# Patient Record
Sex: FEMALE | Race: WHITE | NOT HISPANIC OR LATINO | URBAN - METROPOLITAN AREA
[De-identification: names, ages, dates, MRNs, and addresses within clinical notes are randomized per-mention and may not be internally consistent; named-entity substitution may affect disease eponyms.]

---

## 2016-07-27 RX ORDER — PANTOPRAZOLE SODIUM 20 MG/1
1 TABLET, DELAYED RELEASE ORAL
Qty: 0 | Refills: 0 | DISCHARGE
Start: 2016-07-27

## 2019-01-23 ENCOUNTER — EMERGENCY (EMERGENCY)
Facility: HOSPITAL | Age: 79
LOS: 1 days | Discharge: HOME | End: 2019-01-23
Attending: EMERGENCY MEDICINE | Admitting: EMERGENCY MEDICINE
Payer: MEDICARE

## 2019-01-23 VITALS
HEART RATE: 108 BPM | RESPIRATION RATE: 20 BRPM | TEMPERATURE: 98 F | OXYGEN SATURATION: 92 % | SYSTOLIC BLOOD PRESSURE: 123 MMHG | DIASTOLIC BLOOD PRESSURE: 83 MMHG

## 2019-01-23 VITALS
TEMPERATURE: 98 F | SYSTOLIC BLOOD PRESSURE: 138 MMHG | OXYGEN SATURATION: 95 % | HEART RATE: 68 BPM | RESPIRATION RATE: 18 BRPM | DIASTOLIC BLOOD PRESSURE: 76 MMHG

## 2019-01-23 DIAGNOSIS — Z90.49 ACQUIRED ABSENCE OF OTHER SPECIFIED PARTS OF DIGESTIVE TRACT: Chronic | ICD-10-CM

## 2019-01-23 DIAGNOSIS — Z98.89 OTHER SPECIFIED POSTPROCEDURAL STATES: Chronic | ICD-10-CM

## 2019-01-23 DIAGNOSIS — E03.9 HYPOTHYROIDISM, UNSPECIFIED: ICD-10-CM

## 2019-01-23 DIAGNOSIS — J45.909 UNSPECIFIED ASTHMA, UNCOMPLICATED: ICD-10-CM

## 2019-01-23 DIAGNOSIS — Z79.899 OTHER LONG TERM (CURRENT) DRUG THERAPY: ICD-10-CM

## 2019-01-23 DIAGNOSIS — M54.9 DORSALGIA, UNSPECIFIED: ICD-10-CM

## 2019-01-23 DIAGNOSIS — E78.00 PURE HYPERCHOLESTEROLEMIA, UNSPECIFIED: ICD-10-CM

## 2019-01-23 DIAGNOSIS — R53.1 WEAKNESS: ICD-10-CM

## 2019-01-23 DIAGNOSIS — J18.9 PNEUMONIA, UNSPECIFIED ORGANISM: ICD-10-CM

## 2019-01-23 DIAGNOSIS — I10 ESSENTIAL (PRIMARY) HYPERTENSION: ICD-10-CM

## 2019-01-23 LAB
ALBUMIN SERPL ELPH-MCNC: 3.9 G/DL — SIGNIFICANT CHANGE UP (ref 3.3–5)
ALP SERPL-CCNC: 69 U/L — SIGNIFICANT CHANGE UP (ref 40–120)
ALT FLD-CCNC: 13 U/L — SIGNIFICANT CHANGE UP (ref 10–45)
ANION GAP SERPL CALC-SCNC: 10 MMOL/L — SIGNIFICANT CHANGE UP (ref 5–17)
APPEARANCE UR: CLEAR — SIGNIFICANT CHANGE UP
AST SERPL-CCNC: 24 U/L — SIGNIFICANT CHANGE UP (ref 10–40)
BASOPHILS NFR BLD AUTO: 0.1 % — SIGNIFICANT CHANGE UP (ref 0–2)
BILIRUB SERPL-MCNC: 0.9 MG/DL — SIGNIFICANT CHANGE UP (ref 0.2–1.2)
BILIRUB UR-MCNC: NEGATIVE — SIGNIFICANT CHANGE UP
BUN SERPL-MCNC: 22 MG/DL — SIGNIFICANT CHANGE UP (ref 7–23)
CALCIUM SERPL-MCNC: 9.4 MG/DL — SIGNIFICANT CHANGE UP (ref 8.4–10.5)
CHLORIDE SERPL-SCNC: 105 MMOL/L — SIGNIFICANT CHANGE UP (ref 96–108)
CO2 SERPL-SCNC: 26 MMOL/L — SIGNIFICANT CHANGE UP (ref 22–31)
COLOR SPEC: YELLOW — SIGNIFICANT CHANGE UP
CREAT SERPL-MCNC: 0.84 MG/DL — SIGNIFICANT CHANGE UP (ref 0.5–1.3)
DIFF PNL FLD: NEGATIVE — SIGNIFICANT CHANGE UP
EOSINOPHIL NFR BLD AUTO: 0.1 % — SIGNIFICANT CHANGE UP (ref 0–6)
GLUCOSE SERPL-MCNC: 110 MG/DL — HIGH (ref 70–99)
GLUCOSE UR QL: NEGATIVE — SIGNIFICANT CHANGE UP
HCT VFR BLD CALC: 35.3 % — SIGNIFICANT CHANGE UP (ref 34.5–45)
HGB BLD-MCNC: 11.4 G/DL — LOW (ref 11.5–15.5)
KETONES UR-MCNC: 15 MG/DL
LACTATE SERPL-SCNC: 1.3 MMOL/L — SIGNIFICANT CHANGE UP (ref 0.5–2)
LEUKOCYTE ESTERASE UR-ACNC: ABNORMAL
LIDOCAIN IGE QN: 69 U/L — HIGH (ref 7–60)
LYMPHOCYTES # BLD AUTO: 2.9 % — LOW (ref 13–44)
MCHC RBC-ENTMCNC: 26.9 PG — LOW (ref 27–34)
MCHC RBC-ENTMCNC: 32.3 G/DL — SIGNIFICANT CHANGE UP (ref 32–36)
MCV RBC AUTO: 83.3 FL — SIGNIFICANT CHANGE UP (ref 80–100)
MONOCYTES NFR BLD AUTO: 6 % — SIGNIFICANT CHANGE UP (ref 2–14)
NEUTROPHILS NFR BLD AUTO: 90.9 % — HIGH (ref 43–77)
NITRITE UR-MCNC: NEGATIVE — SIGNIFICANT CHANGE UP
PH UR: 6 — SIGNIFICANT CHANGE UP (ref 5–8)
PLATELET # BLD AUTO: 261 K/UL — SIGNIFICANT CHANGE UP (ref 150–400)
POTASSIUM SERPL-MCNC: 4 MMOL/L — SIGNIFICANT CHANGE UP (ref 3.5–5.3)
POTASSIUM SERPL-SCNC: 4 MMOL/L — SIGNIFICANT CHANGE UP (ref 3.5–5.3)
PROT SERPL-MCNC: 6.8 G/DL — SIGNIFICANT CHANGE UP (ref 6–8.3)
PROT UR-MCNC: ABNORMAL MG/DL
RAPID RVP RESULT: SIGNIFICANT CHANGE UP
RBC # BLD: 4.24 M/UL — SIGNIFICANT CHANGE UP (ref 3.8–5.2)
RBC # FLD: 15.2 % — SIGNIFICANT CHANGE UP (ref 10.3–16.9)
SODIUM SERPL-SCNC: 141 MMOL/L — SIGNIFICANT CHANGE UP (ref 135–145)
SP GR SPEC: 1.02 — SIGNIFICANT CHANGE UP (ref 1–1.03)
TROPONIN T SERPL-MCNC: <0.01 NG/ML — SIGNIFICANT CHANGE UP (ref 0–0.01)
UROBILINOGEN FLD QL: 0.2 E.U./DL — SIGNIFICANT CHANGE UP
WBC # BLD: 16.6 K/UL — HIGH (ref 3.8–10.5)
WBC # FLD AUTO: 16.6 K/UL — HIGH (ref 3.8–10.5)

## 2019-01-23 PROCEDURE — 83605 ASSAY OF LACTIC ACID: CPT

## 2019-01-23 PROCEDURE — 80053 COMPREHEN METABOLIC PANEL: CPT

## 2019-01-23 PROCEDURE — 96361 HYDRATE IV INFUSION ADD-ON: CPT

## 2019-01-23 PROCEDURE — 71250 CT THORAX DX C-: CPT

## 2019-01-23 PROCEDURE — 87798 DETECT AGENT NOS DNA AMP: CPT

## 2019-01-23 PROCEDURE — 87486 CHLMYD PNEUM DNA AMP PROBE: CPT

## 2019-01-23 PROCEDURE — 81001 URINALYSIS AUTO W/SCOPE: CPT

## 2019-01-23 PROCEDURE — 71045 X-RAY EXAM CHEST 1 VIEW: CPT | Mod: 26

## 2019-01-23 PROCEDURE — 93005 ELECTROCARDIOGRAM TRACING: CPT

## 2019-01-23 PROCEDURE — 96360 HYDRATION IV INFUSION INIT: CPT

## 2019-01-23 PROCEDURE — 83690 ASSAY OF LIPASE: CPT

## 2019-01-23 PROCEDURE — 99284 EMERGENCY DEPT VISIT MOD MDM: CPT | Mod: 25

## 2019-01-23 PROCEDURE — 99285 EMERGENCY DEPT VISIT HI MDM: CPT | Mod: 25

## 2019-01-23 PROCEDURE — 71250 CT THORAX DX C-: CPT | Mod: 26

## 2019-01-23 PROCEDURE — 87633 RESP VIRUS 12-25 TARGETS: CPT

## 2019-01-23 PROCEDURE — 93010 ELECTROCARDIOGRAM REPORT: CPT

## 2019-01-23 PROCEDURE — 84484 ASSAY OF TROPONIN QUANT: CPT

## 2019-01-23 PROCEDURE — 87581 M.PNEUMON DNA AMP PROBE: CPT

## 2019-01-23 PROCEDURE — 85025 COMPLETE CBC W/AUTO DIFF WBC: CPT

## 2019-01-23 PROCEDURE — 71045 X-RAY EXAM CHEST 1 VIEW: CPT

## 2019-01-23 PROCEDURE — 36415 COLL VENOUS BLD VENIPUNCTURE: CPT

## 2019-01-23 RX ORDER — CIPROFLOXACIN LACTATE 400MG/40ML
1 VIAL (ML) INTRAVENOUS
Qty: 5 | Refills: 0
Start: 2019-01-23 | End: 2019-01-27

## 2019-01-23 RX ORDER — SODIUM CHLORIDE 9 MG/ML
500 INJECTION INTRAMUSCULAR; INTRAVENOUS; SUBCUTANEOUS ONCE
Qty: 0 | Refills: 0 | Status: COMPLETED | OUTPATIENT
Start: 2019-01-23 | End: 2019-01-23

## 2019-01-23 RX ADMIN — SODIUM CHLORIDE 500 MILLILITER(S): 9 INJECTION INTRAMUSCULAR; INTRAVENOUS; SUBCUTANEOUS at 21:00

## 2019-01-23 RX ADMIN — SODIUM CHLORIDE 1000 MILLILITER(S): 9 INJECTION INTRAMUSCULAR; INTRAVENOUS; SUBCUTANEOUS at 21:01

## 2019-01-23 RX ADMIN — SODIUM CHLORIDE 500 MILLILITER(S): 9 INJECTION INTRAMUSCULAR; INTRAVENOUS; SUBCUTANEOUS at 22:51

## 2019-01-23 RX ADMIN — SODIUM CHLORIDE 1000 MILLILITER(S): 9 INJECTION INTRAMUSCULAR; INTRAVENOUS; SUBCUTANEOUS at 19:10

## 2019-01-23 NOTE — ED PROVIDER NOTE - NSFOLLOWUPINSTRUCTIONS_ED_ALL_ED_FT
Immediately return to the Emergency Department or call 911 for any high fever, trouble breathing, severe vomiting, worsening pain, or any other concerns.    Pneumonia    Pneumonia is an infection of the lungs. Pneumonia may be caused by bacteria, viruses, or funguses. Symptoms include coughing, fever, chest pain when breathing deeply or coughing, shortness of breath, fatigue, or muscle aches. Pneumonia can be diagnosed with a medical history and physical exam, as well as other tests which may include a chest X-ray. If you were prescribed an antibiotic medicine, take it as told by your health care provider and do not stop taking the antibiotic even if you start to feel better. Do not use tobacco products, including cigarettes, chewing tobacco, and e-cigarettes.    SEEK IMMEDIATE MEDICAL CARE IF YOU HAVE ANY OF THE FOLLOWING SYMPTOMS: worsening shortness of breath, worsening chest pain, coughing up blood, change in mental status, lightheadedness/dizziness.

## 2019-01-23 NOTE — ED ADULT NURSE REASSESSMENT NOTE - NS ED NURSE REASSESS COMMENT FT1
patient at rest in stretcher, awaiting CT chest results. Denies acute complaints at this time. NAD Noted Will continue to monitor.

## 2019-01-23 NOTE — ED ADULT NURSE NOTE - OBJECTIVE STATEMENT
79 y/o female c/o generalized weakness x 1 week associated w/ minimally productive cough. Denies fever, chills, chest pain, SOB, dysuria, abdominal pain, nausea, vomiting, and any other associated sx

## 2019-01-23 NOTE — ED PROVIDER NOTE - PHYSICAL EXAMINATION
VITAL SIGNS: I have reviewed nursing notes and confirm.  CONSTITUTIONAL: Well-developed; well-nourished; in no acute distress.  SKIN: Skin is warm and dry, no acute rash.  HEAD: Normocephalic; atraumatic.  EYES:  EOM intact; conjunctiva and sclera clear.  ENT: No nasal discharge; airway clear.  NECK: Supple; Voluntary FROM  CARD: No rubs appreciated, tachycardic rate and rhythm.  RESP: No wheezes, no rales. No respiratory distress  ABD: Soft; non-distended; non-tender; no rebound or guarding  EXT: Normal ROM. No cyanosis or edema.  NEURO: Alert, oriented. Grossly unremarkable. Ambulatory at baseline w/ cane.   Alert, oriented. Grossly unremarkable. Patient is alert, oriented x person, place and time.  Cranial nerves 2-12 are intact.  Normal speech.  Cerebellar testing normal:  normal coordination and normal finger to nose.  Normal sensory exam.  No pronator drift.  5/5 bl upper extremity and lower extremity strength. Normal gait for pt.   PSYCH: Cooperative, appropriate.

## 2019-01-23 NOTE — ED PROVIDER NOTE - MEDICAL DECISION MAKING DETAILS
78F pmh asthma never intubated, HTN, HPL, GERD, hiatal hernia s/p fundoplication 2009, hypothyroid, p/w weakness for 1 week, progressive, generalized, fatigue-type. +cough, minimally productive for past few days, no fevers, no chills. CT noted for LLQ and LUQ pna. pt well appearing overall and feels well. Plan for po abx x1 here and home levofloxacin. Pt amenable to this plan and f/u with PMD on Friday, who she sees often, seeking discharge.

## 2019-01-23 NOTE — ED PROVIDER NOTE - OBJECTIVE STATEMENT
78F pmh   p/w weakness for 1 week, progressive, generalized, fatigue-type. +cough, minimally productive for past few days, no fevers, no chills.   No sob, no cp, no dysuria, no abd pain, no focal weakness, no headache.    Endorses chronic back pain 2/2 severe scoliosis 78F pmh asthma never intubated, HTN, HPL, GERD, hiatal hernia s/p fundoplication 2009, hypothyroid, p/w weakness for 1 week, progressive, generalized, fatigue-type. +cough, minimally productive for past few days, no fevers, no chills.   No sob, no cp, no dysuria, no abd pain, no focal weakness, no headache.  Endorses chronic back pain 2/2 severe scoliosis, unchanged since baseline.

## 2019-01-23 NOTE — ED ADULT NURSE NOTE - PMH
Asthma    Depression    GERD (gastroesophageal reflux disease)    High cholesterol    HTN (hypertension)    Hypothyroid    Primary biliary cirrhosis

## 2019-07-23 ENCOUNTER — EMERGENCY (EMERGENCY)
Facility: HOSPITAL | Age: 79
LOS: 1 days | Discharge: HOME | End: 2019-07-23
Attending: EMERGENCY MEDICINE | Admitting: EMERGENCY MEDICINE
Payer: MEDICARE

## 2019-07-23 VITALS
DIASTOLIC BLOOD PRESSURE: 114 MMHG | OXYGEN SATURATION: 97 % | HEART RATE: 82 BPM | RESPIRATION RATE: 22 BRPM | WEIGHT: 72.97 LBS | TEMPERATURE: 98 F | HEIGHT: 56 IN | SYSTOLIC BLOOD PRESSURE: 173 MMHG

## 2019-07-23 DIAGNOSIS — R06.02 SHORTNESS OF BREATH: ICD-10-CM

## 2019-07-23 DIAGNOSIS — Z90.49 ACQUIRED ABSENCE OF OTHER SPECIFIED PARTS OF DIGESTIVE TRACT: Chronic | ICD-10-CM

## 2019-07-23 DIAGNOSIS — Z98.89 OTHER SPECIFIED POSTPROCEDURAL STATES: Chronic | ICD-10-CM

## 2019-07-23 PROCEDURE — 99285 EMERGENCY DEPT VISIT HI MDM: CPT

## 2019-07-23 PROCEDURE — 71045 X-RAY EXAM CHEST 1 VIEW: CPT | Mod: 26

## 2019-07-23 PROCEDURE — 93010 ELECTROCARDIOGRAM REPORT: CPT

## 2019-07-23 RX ORDER — IPRATROPIUM/ALBUTEROL SULFATE 18-103MCG
3 AEROSOL WITH ADAPTER (GRAM) INHALATION ONCE
Refills: 0 | Status: COMPLETED | OUTPATIENT
Start: 2019-07-23 | End: 2019-07-23

## 2019-07-23 NOTE — ED PROVIDER NOTE - PHYSICAL EXAMINATION
GEN: Elderly, frail, NTAF, awake, alert, oriented to person, place, time/situation and in no apparent distress.  ENT: Airway patent, Nasal mucosa clear. Mouth with normal mucosa.  EYES: Clear bilaterally.  RESPIRATORY: Diminished BS bilaterally with end exp wheezes, no hypoxia, no retractions, mild tachypnea.   CARDIAC: Regular rate and rhythm  ABDOMEN: Soft, nontender, +bowel sounds, no rebound, rigidity, or guarding.  MSK: Range of motion is not limited, no deformities noted. No LE edema. +Scoliosis.  NEURO: Alert and oriented, no focal deficits.  SKIN: Skin normal color for race, warm, dry and intact. No evidence of rash.  PSYCH: Alert and oriented to person, place, time/situation. normal mood and affect. no apparent risk to self or others.

## 2019-07-23 NOTE — ED ADULT NURSE NOTE - CHPI ED NUR SYMPTOMS NEG
no cough/no edema/no fever/no chest pain/no chills/no body aches/no diaphoresis/no wheezing/no headache/no hemoptysis

## 2019-07-23 NOTE — ED PROVIDER NOTE - PROGRESS NOTE DETAILS
On reassessment pt improved. She is eager to go home. She is ambulatory w/o assistance. She was given Rx for inhaler and short course of steroids. Pt is stable for DC. After discussion of the results, pt agreeable to the discharge plan.  At this time, the evidence for any other entities in the differential is insufficient to justify any further testing. This was discussed and explained to the patient. It was advised that persistent or worsening symptoms would require further evaluation. This was discussed with the patient and family using shared decision making. ED evaluation and management discussed with the patient and family (if available) in detail.  Close PMD follow up encouraged.  Strict ED return instructions discussed in detail and patient given the opportunity to ask any questions about their discharge diagnosis and instructions. Patient verbalized understanding.

## 2019-07-23 NOTE — ED ADULT NURSE NOTE - OBJECTIVE STATEMENT
Pt presents complaining of shortness of breath for the past week worsening in the last two hours. Pt reports hx of asthma, states she has been using nebulizers at home to some relief, states no nebulizers used today. Pt reports no chest pain. Positive crackles auscultated in the left lower lobe on presentation. Pt reports no hx of fevers/chills or cough.

## 2019-07-23 NOTE — ED PROVIDER NOTE - CLINICAL SUMMARY MEDICAL DECISION MAKING FREE TEXT BOX
Pt with asthma exacerbation. Will treat with nebulized treatments and steroids. Pt does not require bipap or intubation at this time. No clinical evidence of ACS or PE.  Will observe and monitor for improvement.

## 2019-07-23 NOTE — ED ADULT TRIAGE NOTE - OTHER COMPLAINTS
biba from home for shortness of breath x two hours, denies pain, feels better with oxygen and sitting up

## 2019-07-23 NOTE — ED PROVIDER NOTE - NSFOLLOWUPINSTRUCTIONS_ED_ALL_ED_FT
Please follow up with your primary care physician or pulmonologist. If you have any problem getting an appointment this week, please call the ED Referral Coordinator at [phone].  Return to the Emergency Department if you have any new or worsening symptoms, or for any other concerns. Please read below for further information.    Asthma    Asthma is a condition in which the airways tighten and narrow, making it difficult to breath. Asthma episodes, also called asthma attacks, range from minor to life-threatening. Symptoms include wheezing, coughing, chest tightness, or shortness of breath. The diagnosis of asthma is made by a review of your medical history and a physical exam, but may involve additional testing. Asthma cannot be cured, but medicines and lifestyle changes can help control it. Avoid triggers of asthma which may include animal dander, pollen, mold, smoke, air pollutants, etc.     SEEK IMMEDIATE MEDICAL CARE IF YOU HAVE ANY OF THE FOLLOWING SYMPTOMS: worsening of symptoms, shortness of breath at rest, chest pain, bluish discoloration to lips or fingertips, lightheadedness/dizziness, or fever.

## 2019-07-23 NOTE — ED PROVIDER NOTE - OBJECTIVE STATEMENT
78F w/ PMHx of Asthma (never intubated), HTN, HPL, GERD, hiatal hernia (s/p fundoplication 2009), hypothyroidism, PBC, Depression, internal hemorrhoids, history of near syncope (2/2 to orthostatic and dehydration) who p/w SOB 78F w/ PMHx of Asthma (never intubated), HTN, HPL, GERD, hiatal hernia (s/p fundoplication 2009), hypothyroidism, PBC, Depression, internal hemorrhoids, history of near syncope (2/2 to orthostatic and dehydration) who p/w SOB. She has not used her pump at home. She was seen at urgent care and told to come to the ER bc she BP was high. No Headache, no cp, no dizziness or syncope, no f/c.

## 2019-07-24 VITALS
SYSTOLIC BLOOD PRESSURE: 161 MMHG | HEART RATE: 74 BPM | OXYGEN SATURATION: 98 % | TEMPERATURE: 97 F | DIASTOLIC BLOOD PRESSURE: 96 MMHG | RESPIRATION RATE: 16 BRPM

## 2019-07-24 LAB
ALBUMIN SERPL ELPH-MCNC: 4 G/DL — SIGNIFICANT CHANGE UP (ref 3.3–5)
ALP SERPL-CCNC: 103 U/L — SIGNIFICANT CHANGE UP (ref 40–120)
ALT FLD-CCNC: 19 U/L — SIGNIFICANT CHANGE UP (ref 10–45)
ANION GAP SERPL CALC-SCNC: 10 MMOL/L — SIGNIFICANT CHANGE UP (ref 5–17)
AST SERPL-CCNC: 31 U/L — SIGNIFICANT CHANGE UP (ref 10–40)
BILIRUB SERPL-MCNC: 0.4 MG/DL — SIGNIFICANT CHANGE UP (ref 0.2–1.2)
BUN SERPL-MCNC: 18 MG/DL — SIGNIFICANT CHANGE UP (ref 7–23)
CALCIUM SERPL-MCNC: 9.9 MG/DL — SIGNIFICANT CHANGE UP (ref 8.4–10.5)
CHLORIDE SERPL-SCNC: 102 MMOL/L — SIGNIFICANT CHANGE UP (ref 96–108)
CO2 SERPL-SCNC: 29 MMOL/L — SIGNIFICANT CHANGE UP (ref 22–31)
CREAT SERPL-MCNC: 0.79 MG/DL — SIGNIFICANT CHANGE UP (ref 0.5–1.3)
GLUCOSE SERPL-MCNC: 151 MG/DL — HIGH (ref 70–99)
POTASSIUM SERPL-MCNC: 4 MMOL/L — SIGNIFICANT CHANGE UP (ref 3.5–5.3)
POTASSIUM SERPL-SCNC: 4 MMOL/L — SIGNIFICANT CHANGE UP (ref 3.5–5.3)
PROT SERPL-MCNC: 7.2 G/DL — SIGNIFICANT CHANGE UP (ref 6–8.3)
SODIUM SERPL-SCNC: 141 MMOL/L — SIGNIFICANT CHANGE UP (ref 135–145)
TROPONIN T SERPL-MCNC: <0.01 NG/ML — SIGNIFICANT CHANGE UP (ref 0–0.01)

## 2019-07-24 PROCEDURE — 80053 COMPREHEN METABOLIC PANEL: CPT

## 2019-07-24 PROCEDURE — 36415 COLL VENOUS BLD VENIPUNCTURE: CPT

## 2019-07-24 PROCEDURE — 85025 COMPLETE CBC W/AUTO DIFF WBC: CPT

## 2019-07-24 PROCEDURE — 83880 ASSAY OF NATRIURETIC PEPTIDE: CPT

## 2019-07-24 PROCEDURE — 94640 AIRWAY INHALATION TREATMENT: CPT

## 2019-07-24 PROCEDURE — 84484 ASSAY OF TROPONIN QUANT: CPT

## 2019-07-24 PROCEDURE — 99284 EMERGENCY DEPT VISIT MOD MDM: CPT | Mod: 25

## 2019-07-24 PROCEDURE — 71045 X-RAY EXAM CHEST 1 VIEW: CPT

## 2019-07-24 PROCEDURE — 96374 THER/PROPH/DIAG INJ IV PUSH: CPT

## 2019-07-24 PROCEDURE — 93005 ELECTROCARDIOGRAM TRACING: CPT

## 2019-07-24 RX ORDER — ALBUTEROL 90 UG/1
2 AEROSOL, METERED ORAL
Qty: 1 | Refills: 0
Start: 2019-07-24

## 2019-07-24 RX ADMIN — Medication 3 MILLILITER(S): at 00:03

## 2019-07-24 RX ADMIN — Medication 125 MILLIGRAM(S): at 00:02

## 2019-07-24 NOTE — ED ADULT NURSE REASSESSMENT NOTE - NS ED NURSE REASSESS COMMENT FT1
No acute events. Vital signs stable, safety maintained, bed in lowest position, call bell in reach, will continue to monitor.

## 2019-08-14 ENCOUNTER — EMERGENCY (EMERGENCY)
Facility: HOSPITAL | Age: 79
LOS: 1 days | Discharge: HOME | End: 2019-08-14
Attending: EMERGENCY MEDICINE | Admitting: EMERGENCY MEDICINE
Payer: MEDICARE

## 2019-08-14 VITALS
RESPIRATION RATE: 16 BRPM | HEART RATE: 82 BPM | DIASTOLIC BLOOD PRESSURE: 83 MMHG | HEIGHT: 61 IN | WEIGHT: 74.08 LBS | OXYGEN SATURATION: 97 % | TEMPERATURE: 98 F | SYSTOLIC BLOOD PRESSURE: 128 MMHG

## 2019-08-14 VITALS
RESPIRATION RATE: 18 BRPM | OXYGEN SATURATION: 97 % | SYSTOLIC BLOOD PRESSURE: 119 MMHG | DIASTOLIC BLOOD PRESSURE: 82 MMHG | HEART RATE: 84 BPM | TEMPERATURE: 97 F

## 2019-08-14 DIAGNOSIS — Z90.49 ACQUIRED ABSENCE OF OTHER SPECIFIED PARTS OF DIGESTIVE TRACT: Chronic | ICD-10-CM

## 2019-08-14 DIAGNOSIS — Z98.89 OTHER SPECIFIED POSTPROCEDURAL STATES: Chronic | ICD-10-CM

## 2019-08-14 LAB
ALBUMIN SERPL ELPH-MCNC: 3.7 G/DL — SIGNIFICANT CHANGE UP (ref 3.3–5)
ALP SERPL-CCNC: 73 U/L — SIGNIFICANT CHANGE UP (ref 40–120)
ALT FLD-CCNC: 18 U/L — SIGNIFICANT CHANGE UP (ref 10–45)
ANION GAP SERPL CALC-SCNC: 12 MMOL/L — SIGNIFICANT CHANGE UP (ref 5–17)
APPEARANCE UR: CLEAR — SIGNIFICANT CHANGE UP
AST SERPL-CCNC: 25 U/L — SIGNIFICANT CHANGE UP (ref 10–40)
BACTERIA # UR AUTO: PRESENT /HPF
BASOPHILS # BLD AUTO: 0.02 K/UL — SIGNIFICANT CHANGE UP (ref 0–0.2)
BASOPHILS NFR BLD AUTO: 0.2 % — SIGNIFICANT CHANGE UP (ref 0–2)
BILIRUB SERPL-MCNC: 0.5 MG/DL — SIGNIFICANT CHANGE UP (ref 0.2–1.2)
BILIRUB UR-MCNC: NEGATIVE — SIGNIFICANT CHANGE UP
BUN SERPL-MCNC: 35 MG/DL — HIGH (ref 7–23)
CALCIUM SERPL-MCNC: 9.7 MG/DL — SIGNIFICANT CHANGE UP (ref 8.4–10.5)
CHLORIDE SERPL-SCNC: 102 MMOL/L — SIGNIFICANT CHANGE UP (ref 96–108)
CO2 SERPL-SCNC: 28 MMOL/L — SIGNIFICANT CHANGE UP (ref 22–31)
COLOR SPEC: YELLOW — SIGNIFICANT CHANGE UP
CREAT SERPL-MCNC: 0.83 MG/DL — SIGNIFICANT CHANGE UP (ref 0.5–1.3)
DIFF PNL FLD: NEGATIVE — SIGNIFICANT CHANGE UP
EOSINOPHIL # BLD AUTO: 0.13 K/UL — SIGNIFICANT CHANGE UP (ref 0–0.5)
EOSINOPHIL NFR BLD AUTO: 1 % — SIGNIFICANT CHANGE UP (ref 0–6)
EPI CELLS # UR: ABNORMAL /HPF (ref 0–5)
EXTRA BLUE TOP TUBE: SIGNIFICANT CHANGE UP
EXTRA SST TUBE: SIGNIFICANT CHANGE UP
GLUCOSE SERPL-MCNC: 95 MG/DL — SIGNIFICANT CHANGE UP (ref 70–99)
GLUCOSE UR QL: NEGATIVE — SIGNIFICANT CHANGE UP
HCT VFR BLD CALC: 36.9 % — SIGNIFICANT CHANGE UP (ref 34.5–45)
HGB BLD-MCNC: 11.5 G/DL — SIGNIFICANT CHANGE UP (ref 11.5–15.5)
IMM GRANULOCYTES NFR BLD AUTO: 0.5 % — SIGNIFICANT CHANGE UP (ref 0–1.5)
KETONES UR-MCNC: NEGATIVE — SIGNIFICANT CHANGE UP
LEUKOCYTE ESTERASE UR-ACNC: ABNORMAL
LYMPHOCYTES # BLD AUTO: 0.52 K/UL — LOW (ref 1–3.3)
LYMPHOCYTES # BLD AUTO: 3.9 % — LOW (ref 13–44)
MCHC RBC-ENTMCNC: 26.8 PG — LOW (ref 27–34)
MCHC RBC-ENTMCNC: 31.2 GM/DL — LOW (ref 32–36)
MCV RBC AUTO: 86 FL — SIGNIFICANT CHANGE UP (ref 80–100)
MONOCYTES # BLD AUTO: 0.87 K/UL — SIGNIFICANT CHANGE UP (ref 0–0.9)
MONOCYTES NFR BLD AUTO: 6.5 % — SIGNIFICANT CHANGE UP (ref 2–14)
NEUTROPHILS # BLD AUTO: 11.72 K/UL — HIGH (ref 1.8–7.4)
NEUTROPHILS NFR BLD AUTO: 87.9 % — HIGH (ref 43–77)
NITRITE UR-MCNC: NEGATIVE — SIGNIFICANT CHANGE UP
NRBC # BLD: 0 /100 WBCS — SIGNIFICANT CHANGE UP (ref 0–0)
PH UR: 6 — SIGNIFICANT CHANGE UP (ref 5–8)
PLATELET # BLD AUTO: 235 K/UL — SIGNIFICANT CHANGE UP (ref 150–400)
POTASSIUM SERPL-MCNC: 4.1 MMOL/L — SIGNIFICANT CHANGE UP (ref 3.5–5.3)
POTASSIUM SERPL-SCNC: 4.1 MMOL/L — SIGNIFICANT CHANGE UP (ref 3.5–5.3)
PROT SERPL-MCNC: 6.6 G/DL — SIGNIFICANT CHANGE UP (ref 6–8.3)
PROT UR-MCNC: 30 MG/DL
RBC # BLD: 4.29 M/UL — SIGNIFICANT CHANGE UP (ref 3.8–5.2)
RBC # FLD: 16.1 % — HIGH (ref 10.3–14.5)
RBC CASTS # UR COMP ASSIST: < 5 /HPF — SIGNIFICANT CHANGE UP
SODIUM SERPL-SCNC: 142 MMOL/L — SIGNIFICANT CHANGE UP (ref 135–145)
SP GR SPEC: 1.02 — SIGNIFICANT CHANGE UP (ref 1–1.03)
UROBILINOGEN FLD QL: 0.2 E.U./DL — SIGNIFICANT CHANGE UP
WBC # BLD: 13.33 K/UL — HIGH (ref 3.8–10.5)
WBC # FLD AUTO: 13.33 K/UL — HIGH (ref 3.8–10.5)
WBC UR QL: ABNORMAL /HPF

## 2019-08-14 PROCEDURE — 80053 COMPREHEN METABOLIC PANEL: CPT

## 2019-08-14 PROCEDURE — 99283 EMERGENCY DEPT VISIT LOW MDM: CPT | Mod: 25

## 2019-08-14 PROCEDURE — 82962 GLUCOSE BLOOD TEST: CPT

## 2019-08-14 PROCEDURE — 36415 COLL VENOUS BLD VENIPUNCTURE: CPT

## 2019-08-14 PROCEDURE — 99284 EMERGENCY DEPT VISIT MOD MDM: CPT

## 2019-08-14 PROCEDURE — 87086 URINE CULTURE/COLONY COUNT: CPT

## 2019-08-14 PROCEDURE — 85025 COMPLETE CBC W/AUTO DIFF WBC: CPT

## 2019-08-14 PROCEDURE — 81001 URINALYSIS AUTO W/SCOPE: CPT

## 2019-08-14 PROCEDURE — 93005 ELECTROCARDIOGRAM TRACING: CPT

## 2019-08-14 PROCEDURE — 93010 ELECTROCARDIOGRAM REPORT: CPT

## 2019-08-14 RX ORDER — SODIUM CHLORIDE 9 MG/ML
500 INJECTION INTRAMUSCULAR; INTRAVENOUS; SUBCUTANEOUS ONCE
Refills: 0 | Status: COMPLETED | OUTPATIENT
Start: 2019-08-14 | End: 2019-08-14

## 2019-08-14 RX ADMIN — SODIUM CHLORIDE 500 MILLILITER(S): 9 INJECTION INTRAMUSCULAR; INTRAVENOUS; SUBCUTANEOUS at 13:20

## 2019-08-14 NOTE — ED ADULT NURSE NOTE - CHPI ED NUR SYMPTOMS NEG
no nausea/no fever/no pain/no vomiting/no weakness/no tingling/no chills/no decreased eating/drinking

## 2019-08-14 NOTE — ED PROVIDER NOTE - CLINICAL SUMMARY MEDICAL DECISION MAKING FREE TEXT BOX
80 y/o F with PMHx of Asthma, PBC, and HTN presents to ED s/p near syncopal episode after abdominal cramping and pain followed by a BM, resolved symptoms after BM and no complaints in ED. No LOC, but dizzy, weak, pale. Plan for labs, EKG, small 500cc bolus, observation. Patient continues to feel okay, will likely be discharged. Requesting outpatient followup with cardiology Dr. Leiva.

## 2019-08-14 NOTE — ED PROVIDER NOTE - CONSTITUTIONAL, MLM
normal... Frail, Cachectic appearing. Awake, alert, oriented to person, place, time/situation and in no apparent distress.

## 2019-08-14 NOTE — ED PROVIDER NOTE - CARE PROVIDER_API CALL
Michel Leiva)  Cardiovascular Disease; Internal Medicine  [address], [place] [address]  Phone: [phone]  Fax: [phone]  Follow Up Time:

## 2019-08-14 NOTE — ED ADULT NURSE NOTE - NSIMPLEMENTINTERV_GEN_ALL_ED
Implemented All Universal Safety Interventions:  [place] to call system. Call bell, personal items and telephone within reach. Instruct patient to call for assistance. Room bathroom lighting operational. Non-slip footwear when patient is off stretcher. Physically safe environment: no spills, clutter or unnecessary equipment. Stretcher in lowest position, wheels locked, appropriate side rails in place.

## 2019-08-14 NOTE — ED ADULT NURSE NOTE - OBJECTIVE STATEMENT
78y/o F a&ox4 a&ox4 BIBA c/o dizziness. Pt was at home and had a dizziness episode prior to a BM. was prescribed colace by urgent care yesterday due to constipation and when passing BM this am felt dizzy for around 5 mis. no LOC. no hitting head. denies dizziness now. no chest pain, SOB, n/v/d, fevers, chills, abd pain. speaking in appropriate sentences, airway patent, BS clear bilaterally. PMH of HTN, HLD, autoimmune liver disease, asthma. Pt states  I feel fine now."

## 2019-08-14 NOTE — ED PROVIDER NOTE - NSFOLLOWUPINSTRUCTIONS_ED_ALL_ED_FT
Follow up with Dr Leiva info below.     Return to ED with worsening symptoms or other concerns.    Stay hydrated and eat well balanced meals.    Near-Syncope  ImageNear-syncope is when you suddenly become weak or dizzy, or you feel like you might pass out (faint). During an episode of near-syncope, you may:  Feel dizzy or light-headed.  Feel nauseous.  See all white or all black in your field of vision.  Have cold, clammy skin.  This condition is caused by a sudden decrease in blood flow to the brain. This decrease can result from various causes, but most of those causes are not dangerous. However, near-syncope can be a sign of a serious medical problem, so it is important to seek medical care.    If you fainted, get medical help right away.Call your local emergency services (054 in the [place] Do not drive yourself to the hospital.    Follow these instructions at home:  Pay attention to any changes in your symptoms. Take these actions to help with your condition:  Have someone stay with you until you feel stable.  Do not drive, use machinery, or play sports until your health care provider says it is okay.  Keep all follow-up visits as told by your health care provider. This is important.  If you start to feel like you might faint, lie down right away and raise (elevate) your feet above the level of your heart. Breathe deeply and steadily. Wait until all of the symptoms have passed.  Drink enough fluid to keep your urine clear or pale yellow.  If you are taking blood pressure or heart medicine, get up slowly and take several minutes to sit and then stand. This can reduce dizziness.  Take over-the-counter and prescription medicines only as told by your health care provider.  Get help right away if:  You have a severe headache.  You have unusual pain in your chest, abdomen, or back.  You are bleeding from your mouth or rectum, or you have black or tarry stool.  You have a very fast or irregular heartbeat (palpitations).  You faint once or repeatedly.  You have a seizure.  You are confused.  You have trouble walking.  You have severe weakness.  You have vision problems.  These symptoms may represent a serious problem that is an emergency. Do not wait to see if your symptoms will go away. Get medical help right away. Call your local emergency services (555 in the [place] Do not drive yourself to the hospital.     This information is not intended to replace advice given to you by your health care provider. Make sure you discuss any questions you have with your health care provider.    Document Released: 12/18/2006 Document Revised: 01/30/2018 Document Reviewed: 08/31/2016  [employer] Interactive Patient Education © 2019 [employer]

## 2019-08-14 NOTE — ED PROVIDER NOTE - OBJECTIVE STATEMENT
80 y/o F with PMHx of Asthma, PBC, and HTN presents to ED s/p near syncopal episode after abdominal cramping and pain followed by a BM. Patient states that after the BM she felt much better. Daughter witnessed event and said she never completely lost consciousness but became dizzy, weak, and pale appearing. In ED, patient has no complaints and states pain has resolved. Only complains about being slightly tired. Denies dizziness, weakness, chest pain, or prior Hx of CAD.

## 2019-08-14 NOTE — ED ADULT TRIAGE NOTE - OTHER COMPLAINTS
pt experienced an episode of dizziness with abd cramps prior to having bm this am. pt asymptomatic at this time. no neuro deficits noted.

## 2019-08-15 LAB
CULTURE RESULTS: NO GROWTH — SIGNIFICANT CHANGE UP
SPECIMEN SOURCE: SIGNIFICANT CHANGE UP

## 2019-08-19 DIAGNOSIS — E78.00 PURE HYPERCHOLESTEROLEMIA, UNSPECIFIED: ICD-10-CM

## 2019-08-19 DIAGNOSIS — Z79.899 OTHER LONG TERM (CURRENT) DRUG THERAPY: ICD-10-CM

## 2019-08-19 DIAGNOSIS — R55 SYNCOPE AND COLLAPSE: ICD-10-CM

## 2019-08-19 DIAGNOSIS — R42 DIZZINESS AND GIDDINESS: ICD-10-CM

## 2019-08-19 DIAGNOSIS — J45.909 UNSPECIFIED ASTHMA, UNCOMPLICATED: ICD-10-CM

## 2019-09-04 ENCOUNTER — EMERGENCY (EMERGENCY)
Facility: HOSPITAL | Age: 79
LOS: 1 days | Discharge: HOME | End: 2019-09-04
Attending: EMERGENCY MEDICINE | Admitting: EMERGENCY MEDICINE
Payer: MEDICARE

## 2019-09-04 VITALS
TEMPERATURE: 98 F | OXYGEN SATURATION: 96 % | SYSTOLIC BLOOD PRESSURE: 117 MMHG | HEIGHT: 56 IN | HEART RATE: 91 BPM | WEIGHT: 74.96 LBS | DIASTOLIC BLOOD PRESSURE: 79 MMHG | RESPIRATION RATE: 18 BRPM

## 2019-09-04 VITALS
RESPIRATION RATE: 12 BRPM | HEART RATE: 80 BPM | OXYGEN SATURATION: 98 % | TEMPERATURE: 98 F | DIASTOLIC BLOOD PRESSURE: 84 MMHG | SYSTOLIC BLOOD PRESSURE: 150 MMHG

## 2019-09-04 DIAGNOSIS — Z90.49 ACQUIRED ABSENCE OF OTHER SPECIFIED PARTS OF DIGESTIVE TRACT: Chronic | ICD-10-CM

## 2019-09-04 DIAGNOSIS — Z98.89 OTHER SPECIFIED POSTPROCEDURAL STATES: Chronic | ICD-10-CM

## 2019-09-04 PROCEDURE — 99282 EMERGENCY DEPT VISIT SF MDM: CPT

## 2019-09-04 PROCEDURE — 99283 EMERGENCY DEPT VISIT LOW MDM: CPT

## 2019-09-04 RX ORDER — MULTIVIT WITH MIN/MFOLATE/K2 340-15/3 G
1 POWDER (GRAM) ORAL ONCE
Refills: 0 | Status: COMPLETED | OUTPATIENT
Start: 2019-09-04 | End: 2019-09-04

## 2019-09-04 RX ADMIN — Medication 1 BOTTLE: at 14:49

## 2019-09-04 NOTE — ED ADULT NURSE NOTE - CHIEF COMPLAINT QUOTE
Patient c/o "on and off" constipation for a week. Today she tried colace and laxative and had a small BM today.
I saw and evaluated patient with resident. I discussed H+P and MDM with resident. I agree with the statements made by the resident unless otherwise noted.

## 2019-09-04 NOTE — ED PROVIDER NOTE - ATTENDING CONTRIBUTION TO CARE
78yo F hx htn, hld, asthma, ?copd, pbc, cva, here for concern for constipation x several days, hasn't had BM in a few days, despite OTC meds. chronic constipation usually responsive to laxatives but here because still not had one. no abdominal pain, n/v. sees her pcp regularly, last colonoscopy 2014. VSS, abdominal exam benign, no distension, no mass felt. +weight loss, but saw 2 GI for this and her PCP, decided against repeat colonoscopy, would not pursue further in the ED as already being managed outpatient and pt has no obstructive symptoms and a benign abdomen. DC home in NAD with strict return precautions given. will trial magnesium citrate

## 2019-09-04 NOTE — ED PROVIDER NOTE - PHYSICAL EXAMINATION
VITAL SIGNS: I have reviewed nursing notes and confirm.  CONSTITUTIONAL: cachectic; in no acute distress.   SKIN:  warm and dry, no acute rash.   HEAD:  normocephalic, atraumatic.  EYES: EOM intact; conjunctiva and sclera clear.  ENT: No nasal discharge; airway clear.   NECK: Supple; non tender.  CARD: S1, S2 normal; no murmurs, gallops, or rubs. Regular rate and rhythm.   RESP:  scattered wheezes and crackles, no increased work of breathing  ABD: Normal bowel sounds; soft; non-distended; non-tender; no guarding/ rebound, no masses  EXT: Normal ROM. No clubbing, cyanosis or edema. 2+ pulses to b/l ue/le.  NEURO: Alert, oriented, grossly unremarkable  PSYCH: Cooperative, mood and affect appropriate.

## 2019-09-04 NOTE — ED PROVIDER NOTE - PATIENT PORTAL LINK FT
You can access the FollowMyHealth Patient Portal offered by [facility] by registering at the following website: [email or web address]. By joining [employer]’s FollowMyHealth portal, you will also be able to view your health information using other applications (apps) compatible with our system.

## 2019-09-04 NOTE — ED PROVIDER NOTE - CLINICAL SUMMARY MEDICAL DECISION MAKING FREE TEXT BOX
79 year old female with history of chronic constipation, unintentional weight loss in the last year, presenting with constipation for several days with no abdominal pain nausea or vomitting. Patient has seen her PCP and GI doctor regarding weight loss. Vitals within normal limits, abdominal exam is benign without tenderness, distension, or masses. Likely exacerbation of chronic constipation vs colon ca in the setting of weight loss. Will defer to outpatient providers for further workup of weight loss. Patient prescribed mag citrate, instructed to follow up with GI doctor or return if constipation does not improve.

## 2019-09-04 NOTE — ED PROVIDER NOTE - OBJECTIVE STATEMENT
80yo F hx htn, hld, asthma, ?copd, pbc, cva, here for concern for constipation x several days, hasn't had BM in a few days, despite OTC meds. chronic constipation usually responsive to laxatives but here because still not had one. no abdominal pain, no n/v. sees her pcp regularly, last colonoscopy 2014.

## 2019-09-04 NOTE — ED PROVIDER NOTE - NSFOLLOWUPINSTRUCTIONS_ED_ALL_ED_FT
Please follow up with your GI doctors / PCP for your constipation.     Constipation    Constipation is when a person has fewer than three bowel movements a week, has difficulty having a bowel movement, or has stools that are dry, hard, or larger than normal. Other symptoms can include abdominal pain or bloating. As people grow older, constipation is more common. A low-fiber diet, not taking in enough fluids, and taking certain medicines, including opioid painkillers, may make constipation worse. Treatment varies but may include dietary modifications (more fiber-rich foods), lifestyle modifications, and possible medications.     SEEK IMMEDIATE MEDICAL CARE IF YOU HAVE ANY OF THE FOLLOWING SYMPTOMS: bright red blood in your stool, constipation for longer than 4 days, abdominal or rectal pain, additional unexplained weight loss, or inability to pass gas.

## 2019-09-04 NOTE — ED ADULT TRIAGE NOTE - CHIEF COMPLAINT QUOTE
Patient c/o "on and off" constipation for a week. Today she tried colace and laxative and had a small BM today.

## 2019-09-04 NOTE — ED ADULT NURSE NOTE - INTERVENTIONS DEFINITIONS
[place] to call system/Stretcher in lowest position, wheels locked, appropriate side rails in place/Instruct patient to call for assistance/Provide visual cue, wrist band, yellow gown, etc./Non-slip footwear when patient is off stretcher/Physically safe environment: no spills, clutter or unnecessary equipment

## 2019-09-04 NOTE — ED ADULT NURSE NOTE - OBJECTIVE STATEMENT
c.o constipation for one week. states last normal BM was a week ago. denies any abd pain, nausea, vomiting, diarrhea, chest pain, sob, fever/chills, urinary complaints or any other medical complaints. states she took her daughters medication for constipation (with no relief) about an hour ago but is unable to recall name of medication.

## 2019-09-10 DIAGNOSIS — E78.5 HYPERLIPIDEMIA, UNSPECIFIED: ICD-10-CM

## 2019-09-10 DIAGNOSIS — R63.4 ABNORMAL WEIGHT LOSS: ICD-10-CM

## 2019-09-10 DIAGNOSIS — Z79.899 OTHER LONG TERM (CURRENT) DRUG THERAPY: ICD-10-CM

## 2019-09-10 DIAGNOSIS — Z79.52 LONG TERM (CURRENT) USE OF SYSTEMIC STEROIDS: ICD-10-CM

## 2019-09-10 DIAGNOSIS — K59.00 CONSTIPATION, UNSPECIFIED: ICD-10-CM

## 2019-09-10 DIAGNOSIS — Z91.018 ALLERGY TO OTHER FOODS: ICD-10-CM

## 2019-09-10 DIAGNOSIS — I10 ESSENTIAL (PRIMARY) HYPERTENSION: ICD-10-CM

## 2019-09-10 DIAGNOSIS — J45.909 UNSPECIFIED ASTHMA, UNCOMPLICATED: ICD-10-CM

## 2019-09-10 DIAGNOSIS — Z87.891 PERSONAL HISTORY OF NICOTINE DEPENDENCE: ICD-10-CM

## 2020-03-07 VITALS
HEART RATE: 68 BPM | OXYGEN SATURATION: 100 % | SYSTOLIC BLOOD PRESSURE: 146 MMHG | TEMPERATURE: 97 F | DIASTOLIC BLOOD PRESSURE: 85 MMHG | RESPIRATION RATE: 20 BRPM

## 2020-03-07 LAB
ALBUMIN SERPL ELPH-MCNC: 4 G/DL — SIGNIFICANT CHANGE UP (ref 3.3–5)
ALP SERPL-CCNC: 67 U/L — SIGNIFICANT CHANGE UP (ref 40–120)
ALT FLD-CCNC: 8 U/L — LOW (ref 10–45)
ANION GAP SERPL CALC-SCNC: 13 MMOL/L — SIGNIFICANT CHANGE UP (ref 5–17)
APPEARANCE UR: CLEAR — SIGNIFICANT CHANGE UP
APTT BLD: 29 SEC — SIGNIFICANT CHANGE UP (ref 27.5–36.3)
AST SERPL-CCNC: 20 U/L — SIGNIFICANT CHANGE UP (ref 10–40)
BASOPHILS # BLD AUTO: 0.04 K/UL — SIGNIFICANT CHANGE UP (ref 0–0.2)
BASOPHILS NFR BLD AUTO: 0.4 % — SIGNIFICANT CHANGE UP (ref 0–2)
BILIRUB SERPL-MCNC: 0.7 MG/DL — SIGNIFICANT CHANGE UP (ref 0.2–1.2)
BILIRUB UR-MCNC: NEGATIVE — SIGNIFICANT CHANGE UP
BUN SERPL-MCNC: 15 MG/DL — SIGNIFICANT CHANGE UP (ref 7–23)
CALCIUM SERPL-MCNC: 10 MG/DL — SIGNIFICANT CHANGE UP (ref 8.4–10.5)
CHLORIDE SERPL-SCNC: 104 MMOL/L — SIGNIFICANT CHANGE UP (ref 96–108)
CO2 SERPL-SCNC: 28 MMOL/L — SIGNIFICANT CHANGE UP (ref 22–31)
COLOR SPEC: YELLOW — SIGNIFICANT CHANGE UP
CREAT SERPL-MCNC: 0.75 MG/DL — SIGNIFICANT CHANGE UP (ref 0.5–1.3)
DIFF PNL FLD: ABNORMAL
EOSINOPHIL # BLD AUTO: 0.31 K/UL — SIGNIFICANT CHANGE UP (ref 0–0.5)
EOSINOPHIL NFR BLD AUTO: 3.3 % — SIGNIFICANT CHANGE UP (ref 0–6)
GLUCOSE SERPL-MCNC: 125 MG/DL — HIGH (ref 70–99)
GLUCOSE UR QL: NEGATIVE — SIGNIFICANT CHANGE UP
HCT VFR BLD CALC: 38.3 % — SIGNIFICANT CHANGE UP (ref 34.5–45)
HGB BLD-MCNC: 12.1 G/DL — SIGNIFICANT CHANGE UP (ref 11.5–15.5)
IMM GRANULOCYTES NFR BLD AUTO: 0.4 % — SIGNIFICANT CHANGE UP (ref 0–1.5)
INR BLD: 1.08 — SIGNIFICANT CHANGE UP (ref 0.88–1.16)
KETONES UR-MCNC: NEGATIVE — SIGNIFICANT CHANGE UP
LEUKOCYTE ESTERASE UR-ACNC: NEGATIVE — SIGNIFICANT CHANGE UP
LYMPHOCYTES # BLD AUTO: 0.76 K/UL — LOW (ref 1–3.3)
LYMPHOCYTES # BLD AUTO: 8 % — LOW (ref 13–44)
MCHC RBC-ENTMCNC: 27.1 PG — SIGNIFICANT CHANGE UP (ref 27–34)
MCHC RBC-ENTMCNC: 31.6 GM/DL — LOW (ref 32–36)
MCV RBC AUTO: 85.9 FL — SIGNIFICANT CHANGE UP (ref 80–100)
MONOCYTES # BLD AUTO: 0.74 K/UL — SIGNIFICANT CHANGE UP (ref 0–0.9)
MONOCYTES NFR BLD AUTO: 7.8 % — SIGNIFICANT CHANGE UP (ref 2–14)
NEUTROPHILS # BLD AUTO: 7.62 K/UL — HIGH (ref 1.8–7.4)
NEUTROPHILS NFR BLD AUTO: 80.1 % — HIGH (ref 43–77)
NITRITE UR-MCNC: NEGATIVE — SIGNIFICANT CHANGE UP
NRBC # BLD: 0 /100 WBCS — SIGNIFICANT CHANGE UP (ref 0–0)
PH UR: 8 — SIGNIFICANT CHANGE UP (ref 5–8)
PLATELET # BLD AUTO: 269 K/UL — SIGNIFICANT CHANGE UP (ref 150–400)
POTASSIUM SERPL-MCNC: 3.9 MMOL/L — SIGNIFICANT CHANGE UP (ref 3.5–5.3)
POTASSIUM SERPL-SCNC: 3.9 MMOL/L — SIGNIFICANT CHANGE UP (ref 3.5–5.3)
PROT SERPL-MCNC: 7 G/DL — SIGNIFICANT CHANGE UP (ref 6–8.3)
PROT UR-MCNC: NEGATIVE MG/DL — SIGNIFICANT CHANGE UP
PROTHROM AB SERPL-ACNC: 12.3 SEC — SIGNIFICANT CHANGE UP (ref 10–12.9)
RBC # BLD: 4.46 M/UL — SIGNIFICANT CHANGE UP (ref 3.8–5.2)
RBC # FLD: 15 % — HIGH (ref 10.3–14.5)
SODIUM SERPL-SCNC: 145 MMOL/L — SIGNIFICANT CHANGE UP (ref 135–145)
SP GR SPEC: 1.01 — SIGNIFICANT CHANGE UP (ref 1–1.03)
TROPONIN T SERPL-MCNC: <0.01 NG/ML — SIGNIFICANT CHANGE UP (ref 0–0.01)
UROBILINOGEN FLD QL: 0.2 E.U./DL — SIGNIFICANT CHANGE UP
WBC # BLD: 9.51 K/UL — SIGNIFICANT CHANGE UP (ref 3.8–10.5)
WBC # FLD AUTO: 9.51 K/UL — SIGNIFICANT CHANGE UP (ref 3.8–10.5)

## 2020-03-07 PROCEDURE — 71046 X-RAY EXAM CHEST 2 VIEWS: CPT | Mod: 26

## 2020-03-07 PROCEDURE — 70498 CT ANGIOGRAPHY NECK: CPT | Mod: 26

## 2020-03-07 PROCEDURE — 70496 CT ANGIOGRAPHY HEAD: CPT | Mod: 26

## 2020-03-07 PROCEDURE — 70450 CT HEAD/BRAIN W/O DYE: CPT | Mod: 26,59

## 2020-03-07 NOTE — ED ADULT NURSE NOTE - NSIMPLEMENTINTERV_GEN_ALL_ED
Implemented All Fall Risk Interventions:  [place] to call system. Call bell, personal items and telephone within reach. Instruct patient to call for assistance. Room bathroom lighting operational. Non-slip footwear when patient is off stretcher. Physically safe environment: no spills, clutter or unnecessary equipment. Stretcher in lowest position, wheels locked, appropriate side rails in place. Provide visual cue, wrist band, yellow gown, etc. Monitor gait and stability. Monitor for mental status changes and reorient to person, place, and time. Review medications for side effects contributing to fall risk. Reinforce activity limits and safety measures with patient and family.

## 2020-03-07 NOTE — ED ADULT NURSE NOTE - OBJECTIVE STATEMENT
80 y/o female BIBA s/p being "found on floor by daughter." Pt unable to recall events leading up to her being on the floor. Denies pain, states, "I might have been lightheaded." Denies HA, neck pain, no cervical spine tenderness, no external bleeding noted. Able to move all extremities, no unilateral weakness, facial droop, slurred speech, vision changes, nausea/vomiting. Per pt's daughter Zora #[phone], with permission from patient to speak to daughter, pt's baseline mental status - "usually needs to be reminded what day it is." Pt aox2- disoriented to time, states "is it 2005?" EKG/FSBG done, reports hx HTN and CVA x2 with no residual deficits reported. Ambulates with a cane at baseline.

## 2020-03-07 NOTE — ED ADULT TRIAGE NOTE - CHIEF COMPLAINT QUOTE
Pt BIBA s/p syncope episode. as per report " pt was found on the floor by daughter"  pmh stroke. pt alert and awake upon arrival, denies dizziness, blurred vision, unilateral weakness. FS and ekg in progress.

## 2020-03-07 NOTE — ED PROVIDER NOTE - ATTENDING CONTRIBUTION TO CARE
79F hx hypothyroid, htn, high chol, asthma, cva (no deficits), BIBEMS after being found on floor.  daughter states she hadn't heard her mom moving around since about noon.  states then found her on the floor tonight. pt does not recall falling. does not know when it occurred either.  states she didn't' eat today.  c/o being hungry. no pain. no dizziness.  gen- nad  heent- ncat, clear conj  cv -rrr  lungs -ctab  abd - soft, nt, nd  ext -wwp, no edema  neuro -aox3, hanson  syncope vs. mechanical fall, pt without memory of events, no complaints currently, no focal neuro deficits. will check ct, xray, labs. pt may require admission for monitoring.

## 2020-03-07 NOTE — ED PROVIDER NOTE - PHYSICAL EXAMINATION
CONSTITUTIONAL: Well-appearing; well-nourished; in no apparent distress.   HEAD: Normocephalic; atraumatic.   EYES: PERRL; EOM intact; conjunctiva and sclera clear  ENT: normal nose; no rhinorrhea; normal pharynx with no erythema or lesions.   NECK: Supple; non-tender; no LAD  CARDIOVASCULAR: Normal S1, S2; no murmurs, rubs, or gallops. Regular rate and rhythm.   RESPIRATORY: Breathing easily; breath sounds clear and equal bilaterally; no wheezes, rhonchi, or rales.  GI: Soft; non-distended; non-tender; no palpable organomegaly.   MSK: FROM at all extremities, normal tone   EXT: No cyanosis or edema; N/V intact  SKIN: Normal for age and race; warm; dry; good turgor; no apparent lesions or rash.   NEURO: AAO x 3, CN II-XII intact, normal speech, strength 5/5 bilateral upper and lower extremities, sensation intact, cerebellum intact, no ataxia, follows commands appropriately  PSYCHOLOGICAL: The patient’s mood and manner are appropriate.

## 2020-03-07 NOTE — ED PROVIDER NOTE - DIAGNOSTIC INTERPRETATION
ER PA: Erendira Carrington, PAC  CHEST XRAY INTERPRETATION: lungs clear, heart shadow normal, scoliosis of T spine

## 2020-03-08 ENCOUNTER — INPATIENT (INPATIENT)
Facility: HOSPITAL | Age: 80
LOS: 1 days | Discharge: HOME HEALTH | DRG: 312 | End: 2020-03-10
Attending: INTERNAL MEDICINE | Admitting: STUDENT IN AN ORGANIZED HEALTH CARE EDUCATION/TRAINING PROGRAM
Payer: MEDICARE

## 2020-03-08 DIAGNOSIS — B37.0 CANDIDAL STOMATITIS: ICD-10-CM

## 2020-03-08 DIAGNOSIS — Z91.89 OTHER SPECIFIED PERSONAL RISK FACTORS, NOT ELSEWHERE CLASSIFIED: ICD-10-CM

## 2020-03-08 DIAGNOSIS — I10 ESSENTIAL (PRIMARY) HYPERTENSION: ICD-10-CM

## 2020-03-08 DIAGNOSIS — Z98.890 OTHER SPECIFIED POSTPROCEDURAL STATES: Chronic | ICD-10-CM
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
## 2020-03-08 DIAGNOSIS — Z98.89 OTHER SPECIFIED POSTPROCEDURAL STATES: Chronic | ICD-10-CM

## 2020-03-08 DIAGNOSIS — Z29.9 ENCOUNTER FOR PROPHYLACTIC MEASURES, UNSPECIFIED: ICD-10-CM

## 2020-03-08 DIAGNOSIS — Z98.49 CATARACT EXTRACTION STATUS, UNSPECIFIED EYE: Chronic | ICD-10-CM

## 2020-03-08 DIAGNOSIS — E78.5 HYPERLIPIDEMIA, UNSPECIFIED: ICD-10-CM

## 2020-03-08 DIAGNOSIS — R63.4 ABNORMAL WEIGHT LOSS: ICD-10-CM

## 2020-03-08 DIAGNOSIS — R63.8 OTHER SYMPTOMS AND SIGNS CONCERNING FOOD AND FLUID INTAKE: ICD-10-CM

## 2020-03-08 DIAGNOSIS — J45.909 UNSPECIFIED ASTHMA, UNCOMPLICATED: ICD-10-CM

## 2020-03-08 DIAGNOSIS — Z90.49 ACQUIRED ABSENCE OF OTHER SPECIFIED PARTS OF DIGESTIVE TRACT: Chronic | ICD-10-CM

## 2020-03-08 DIAGNOSIS — K74.3 PRIMARY BILIARY CIRRHOSIS: ICD-10-CM

## 2020-03-08 DIAGNOSIS — E03.9 HYPOTHYROIDISM, UNSPECIFIED: ICD-10-CM

## 2020-03-08 DIAGNOSIS — R55 SYNCOPE AND COLLAPSE: ICD-10-CM

## 2020-03-08 DIAGNOSIS — R56.9 UNSPECIFIED CONVULSIONS: ICD-10-CM

## 2020-03-08 LAB
ANION GAP SERPL CALC-SCNC: 10 MMOL/L — SIGNIFICANT CHANGE UP (ref 5–17)
BUN SERPL-MCNC: 15 MG/DL — SIGNIFICANT CHANGE UP (ref 7–23)
CALCIUM SERPL-MCNC: 9 MG/DL — SIGNIFICANT CHANGE UP (ref 8.4–10.5)
CHLORIDE SERPL-SCNC: 104 MMOL/L — SIGNIFICANT CHANGE UP (ref 96–108)
CHOLEST SERPL-MCNC: 161 MG/DL — SIGNIFICANT CHANGE UP (ref 10–199)
CO2 SERPL-SCNC: 27 MMOL/L — SIGNIFICANT CHANGE UP (ref 22–31)
CREAT SERPL-MCNC: 1.04 MG/DL — SIGNIFICANT CHANGE UP (ref 0.5–1.3)
GLUCOSE SERPL-MCNC: 103 MG/DL — HIGH (ref 70–99)
HCT VFR BLD CALC: 36.3 % — SIGNIFICANT CHANGE UP (ref 34.5–45)
HDLC SERPL-MCNC: 65 MG/DL — SIGNIFICANT CHANGE UP
HGB BLD-MCNC: 11.6 G/DL — SIGNIFICANT CHANGE UP (ref 11.5–15.5)
LIPID PNL WITH DIRECT LDL SERPL: 83 MG/DL — SIGNIFICANT CHANGE UP
MAGNESIUM SERPL-MCNC: 2.2 MG/DL — SIGNIFICANT CHANGE UP (ref 1.6–2.6)
MCHC RBC-ENTMCNC: 27.3 PG — SIGNIFICANT CHANGE UP (ref 27–34)
MCHC RBC-ENTMCNC: 32 GM/DL — SIGNIFICANT CHANGE UP (ref 32–36)
MCV RBC AUTO: 85.4 FL — SIGNIFICANT CHANGE UP (ref 80–100)
NRBC # BLD: 0 /100 WBCS — SIGNIFICANT CHANGE UP (ref 0–0)
PLATELET # BLD AUTO: 248 K/UL — SIGNIFICANT CHANGE UP (ref 150–400)
POTASSIUM SERPL-MCNC: 3.7 MMOL/L — SIGNIFICANT CHANGE UP (ref 3.5–5.3)
POTASSIUM SERPL-SCNC: 3.7 MMOL/L — SIGNIFICANT CHANGE UP (ref 3.5–5.3)
RBC # BLD: 4.25 M/UL — SIGNIFICANT CHANGE UP (ref 3.8–5.2)
RBC # FLD: 15 % — HIGH (ref 10.3–14.5)
SODIUM SERPL-SCNC: 141 MMOL/L — SIGNIFICANT CHANGE UP (ref 135–145)
TOTAL CHOLESTEROL/HDL RATIO MEASUREMENT: 2.5 RATIO — LOW (ref 3.3–7.1)
TRIGL SERPL-MCNC: 64 MG/DL — SIGNIFICANT CHANGE UP (ref 10–149)
TROPONIN T SERPL-MCNC: <0.01 NG/ML — SIGNIFICANT CHANGE UP (ref 0–0.01)
TSH SERPL-MCNC: 2.36 UIU/ML — SIGNIFICANT CHANGE UP (ref 0.35–4.94)
WBC # BLD: 6.57 K/UL — SIGNIFICANT CHANGE UP (ref 3.8–10.5)
WBC # FLD AUTO: 6.57 K/UL — SIGNIFICANT CHANGE UP (ref 3.8–10.5)

## 2020-03-08 PROCEDURE — 99223 1ST HOSP IP/OBS HIGH 75: CPT | Mod: GC

## 2020-03-08 PROCEDURE — 99285 EMERGENCY DEPT VISIT HI MDM: CPT | Mod: 25

## 2020-03-08 PROCEDURE — 99221 1ST HOSP IP/OBS SF/LOW 40: CPT

## 2020-03-08 PROCEDURE — 93010 ELECTROCARDIOGRAM REPORT: CPT

## 2020-03-08 PROCEDURE — 71046 X-RAY EXAM CHEST 2 VIEWS: CPT | Mod: 26

## 2020-03-08 RX ORDER — PANTOPRAZOLE SODIUM 20 MG/1
40 TABLET, DELAYED RELEASE ORAL
Refills: 0 | Status: DISCONTINUED | OUTPATIENT
Start: 2020-03-08 | End: 2020-03-10

## 2020-03-08 RX ORDER — LEVOTHYROXINE SODIUM 125 MCG
88 TABLET ORAL DAILY
Refills: 0 | Status: DISCONTINUED | OUTPATIENT
Start: 2020-03-08 | End: 2020-03-10

## 2020-03-08 RX ORDER — NYSTATIN 500MM UNIT
500000 POWDER (EA) MISCELLANEOUS EVERY 8 HOURS
Refills: 0 | Status: DISCONTINUED | OUTPATIENT
Start: 2020-03-08 | End: 2020-03-09

## 2020-03-08 RX ORDER — ALBUTEROL 90 UG/1
1.25 AEROSOL, METERED ORAL THREE TIMES A DAY
Refills: 0 | Status: DISCONTINUED | OUTPATIENT
Start: 2020-03-08 | End: 2020-03-10

## 2020-03-08 RX ORDER — ALBUTEROL 90 UG/1
2 AEROSOL, METERED ORAL EVERY 6 HOURS
Refills: 0 | Status: DISCONTINUED | OUTPATIENT
Start: 2020-03-08 | End: 2020-03-09

## 2020-03-08 RX ORDER — POTASSIUM CHLORIDE 20 MEQ
40 PACKET (EA) ORAL ONCE
Refills: 0 | Status: COMPLETED | OUTPATIENT
Start: 2020-03-08 | End: 2020-03-08

## 2020-03-08 RX ORDER — ENOXAPARIN SODIUM 100 MG/ML
40 INJECTION SUBCUTANEOUS EVERY 24 HOURS
Refills: 0 | Status: DISCONTINUED | OUTPATIENT
Start: 2020-03-08 | End: 2020-03-10

## 2020-03-08 RX ORDER — URSODIOL 250 MG/1
1000 TABLET, FILM COATED ORAL
Refills: 0 | Status: DISCONTINUED | OUTPATIENT
Start: 2020-03-08 | End: 2020-03-10

## 2020-03-08 RX ORDER — ATORVASTATIN CALCIUM 80 MG/1
20 TABLET, FILM COATED ORAL AT BEDTIME
Refills: 0 | Status: DISCONTINUED | OUTPATIENT
Start: 2020-03-08 | End: 2020-03-10

## 2020-03-08 RX ORDER — INFLUENZA VIRUS VACCINE 15; 15; 15; 15 UG/.5ML; UG/.5ML; UG/.5ML; UG/.5ML
0.5 SUSPENSION INTRAMUSCULAR ONCE
Refills: 0 | Status: DISCONTINUED | OUTPATIENT
Start: 2020-03-08 | End: 2020-03-10

## 2020-03-08 RX ORDER — MONTELUKAST 4 MG/1
10 TABLET, CHEWABLE ORAL DAILY
Refills: 0 | Status: DISCONTINUED | OUTPATIENT
Start: 2020-03-08 | End: 2020-03-10

## 2020-03-08 RX ORDER — LISINOPRIL 2.5 MG/1
40 TABLET ORAL EVERY 24 HOURS
Refills: 0 | Status: DISCONTINUED | OUTPATIENT
Start: 2020-03-08 | End: 2020-03-10

## 2020-03-08 RX ADMIN — Medication 40 MILLIEQUIVALENT(S): at 10:26

## 2020-03-08 RX ADMIN — ENOXAPARIN SODIUM 40 MILLIGRAM(S): 100 INJECTION SUBCUTANEOUS at 06:23

## 2020-03-08 RX ADMIN — URSODIOL 1000 MILLIGRAM(S): 250 TABLET, FILM COATED ORAL at 13:55

## 2020-03-08 RX ADMIN — Medication 500000 UNIT(S): at 06:23

## 2020-03-08 RX ADMIN — PANTOPRAZOLE SODIUM 40 MILLIGRAM(S): 20 TABLET, DELAYED RELEASE ORAL at 12:07

## 2020-03-08 RX ADMIN — Medication 500000 UNIT(S): at 13:57

## 2020-03-08 RX ADMIN — Medication 88 MICROGRAM(S): at 06:23

## 2020-03-08 RX ADMIN — MONTELUKAST 10 MILLIGRAM(S): 4 TABLET, CHEWABLE ORAL at 12:07

## 2020-03-08 RX ADMIN — Medication 500000 UNIT(S): at 22:15

## 2020-03-08 RX ADMIN — LISINOPRIL 40 MILLIGRAM(S): 2.5 TABLET ORAL at 06:23

## 2020-03-08 RX ADMIN — ATORVASTATIN CALCIUM 20 MILLIGRAM(S): 80 TABLET, FILM COATED ORAL at 22:15

## 2020-03-08 NOTE — H&P ADULT - NSICDXFAMILYHX_GEN_ALL_CORE_FT
FAMILY HISTORY:  No pertinent family history in first degree relatives FAMILY HISTORY:  Family history of CVA, mother  FH: myocardial infarction, father

## 2020-03-08 NOTE — PROGRESS NOTE ADULT - PROBLEM SELECTOR PLAN 2
Patient found to be lethargic after being found down and found to have urinary incontinence, no tongue biting present. Hx of 3 CVA but no seizure history.   - 24 hour EEG  - Consider MRI head  - Contact outpatient neurologist

## 2020-03-08 NOTE — PHYSICAL THERAPY INITIAL EVALUATION ADULT - PLANNED THERAPY INTERVENTIONS, PT EVAL
gait training/postural re-education/transfer training/neuromuscular re-education/balance training/bed mobility training/strengthening

## 2020-03-08 NOTE — PROGRESS NOTE ADULT - SUBJECTIVE AND OBJECTIVE BOX
O/N Events:  Subjective/ROS: Denies HA, CP, SOB, n/v, changes in bowel/urinary habits.  12pt ROS otherwise negative.    VITALS  Vital Signs Last 24 Hrs  T(C): 36.5 (08 Mar 2020 12:02), Max: 36.6 (08 Mar 2020 03:30)  T(F): 97.7 (08 Mar 2020 12:02), Max: 97.9 (08 Mar 2020 05:45)  HR: 61 (08 Mar 2020 12:02) (61 - 68)  BP: 124/77 (08 Mar 2020 12:02) (124/77 - 186/86)  BP(mean): --  RR: 16 (08 Mar 2020 12:02) (15 - 20)  SpO2: 96% (08 Mar 2020 12:02) (95% - 100%)    CAPILLARY BLOOD GLUCOSE      POCT Blood Glucose.: 100 mg/dL (07 Mar 2020 19:40)      PHYSICAL EXAM  General: A&Ox3; NAD  Head: NC/AT; MMM; PERRL; EOMI;  Neck: Supple; no JVD  Respiratory: CTA B/L; no wheezes/crackles   Cardiovascular: Regular rhythm/rate; S1/S2   Gastrointestinal: Soft; NTND; normoactive BS  Extremities: WWP; no edema/cyanosis  Neurological:  CNII-XII grossly intact; no obvious focal deficits    MEDICATIONS  (STANDING):  atorvastatin 20 milliGRAM(s) Oral at bedtime  enoxaparin Injectable 40 milliGRAM(s) SubCutaneous every 24 hours  influenza   Vaccine 0.5 milliLiter(s) IntraMuscular once  levothyroxine 88 MICROGram(s) Oral daily  lisinopril 40 milliGRAM(s) Oral every 24 hours  montelukast 10 milliGRAM(s) Oral daily  nystatin    Suspension 223380 Unit(s) Oral every 8 hours  pantoprazole    Tablet 40 milliGRAM(s) Oral before breakfast  ursodiol Tablet 1000 milliGRAM(s) Oral <User Schedule>    MEDICATIONS  (PRN):  ALBUTerol    90 MICROgram(s) HFA Inhaler 2 Puff(s) Inhalation every 6 hours PRN Shortness of Breath and/or Wheezing  ALBUTerol   0.042% 1.25 milliGRAM(s) Nebulizer three times a day PRN Shortness of Breath and/or Wheezing      Kiwi (Other)  No Known Drug Allergies  Nuts (Rash)  pitted fruits (Hives)      LABS                        11.6   6.57  )-----------( 248      ( 08 Mar 2020 06:11 )             36.3     03-08    141  |  104  |  15  ----------------------------<  103<H>  3.7   |  27  |  1.04    Ca    9.0      08 Mar 2020 06:11  Mg     2.2     03-08    TPro  7.0  /  Alb  4.0  /  TBili  0.7  /  DBili  x   /  AST  20  /  ALT  8<L>  /  AlkPhos  67  03-07    PT/INR - ( 07 Mar 2020 20:17 )   PT: 12.3 sec;   INR: 1.08          PTT - ( 07 Mar 2020 20:17 )  PTT:29.0 sec  Urinalysis Basic - ( 07 Mar 2020 22:32 )    Color: Yellow / Appearance: Clear / S.015 / pH: x  Gluc: x / Ketone: NEGATIVE  / Bili: Negative / Urobili: 0.2 E.U./dL   Blood: x / Protein: NEGATIVE mg/dL / Nitrite: NEGATIVE   Leuk Esterase: NEGATIVE / RBC: < 5 /HPF / WBC < 5 /HPF   Sq Epi: x / Non Sq Epi: 0-5 /HPF / Bacteria: Present /HPF      CARDIAC MARKERS ( 08 Mar 2020 00:14 )  x     / <0.01 ng/mL / x     / x     / x      CARDIAC MARKERS ( 07 Mar 2020 20:17 )  x     / <0.01 ng/mL / 115 U/L / x     / x            IMAGING/EKG/ETC: reviewed

## 2020-03-08 NOTE — PROGRESS NOTE ADULT - ASSESSMENT
79F w/ syncope    Syncope - Orthostatic negative, no anemia, +LOC w/out memory of incidence and w/ urinary incontinence per daughter -- increased concern for seizure.  Cards following for possibly arrhythmia.  Hx of strokes increased risk for seizure.  CT/CTA negative.  - Daily EKG  - EEG    Primary biliary cirrhosis - c/w ursodiol (home med)    Oral thrush - nystatin S/S    HTN - stable    Hypothyroid - c/w synthroid    Asthma- c/w singular    DVT ppx w/ lovenox

## 2020-03-08 NOTE — PHYSICAL THERAPY INITIAL EVALUATION ADULT - PATIENT/FAMILY/SIGNIFICANT OTHER GOALS STATEMENT, PT EVAL
no formal goals stated, Patient agreeable to participate, pleasant making RW walker jokes about Jasper Eason.

## 2020-03-08 NOTE — PROGRESS NOTE ADULT - PROBLEM SELECTOR PLAN 3
Reports loss of weight ; given thrush will check A1c; check also TSH; further workup as outpt if no urgent cause found

## 2020-03-08 NOTE — H&P ADULT - NSICDXPASTSURGICALHX_GEN_ALL_CORE_FT
PAST SURGICAL HISTORY:  History of cholecystectomy     History of Nissen fundoplication PAST SURGICAL HISTORY:  H/O knee surgery     History of cholecystectomy     History of Nissen fundoplication     S/P cataract surgery

## 2020-03-08 NOTE — H&P ADULT - PROBLEM SELECTOR PLAN 3
History of HLD on History of HLD on Lipitor 20mg daily.  - f/u Lipid Profile History of HLD on Lipitor 20mg daily.  - f/u Lipid Profile  - Continue medication. History of hypothyroidism, on Synthroid 88mcg daily.   - f/u TSH  - Continue with medications. ADDENDUM: reports loss of weight ; given thrush will check A1c; check also TSH; further workup as outpt if no urgent cause found

## 2020-03-08 NOTE — PHYSICAL THERAPY INITIAL EVALUATION ADULT - MODALITIES TREATMENT COMMENTS
EOMI, visual fields full, no noted nystagmus, convergence/divergence WNL, face sensation WNL V1-3, face symmetrical, hearing diminished left ear, tongue midline, shoulder shrug WNL, no noted pronator drift, patient naming 9/10 body parts with laterality, answering 8/8 yes/no questions

## 2020-03-08 NOTE — PHYSICAL THERAPY INITIAL EVALUATION ADULT - PERTINENT HX OF CURRENT PROBLEM, REHAB EVAL
80 yo F with PMH of CVA x 3 (no residual deficits), HTN, HLD, asthma, COPD, scoliosis BIBEMS after found lying on floor in bedroom, with LOC, no trauma, admitted for syncope and fall. Differential includes seizure vs. vasovagal vs. TIA, seen for PT eval hospital day # 1

## 2020-03-08 NOTE — H&P ADULT - HISTORY OF PRESENT ILLNESS
78 yo F with pmh of CVA x 2 (no residual deficits), HTN, HLD, asthma COPD, scoliosis bib ems after found lying on floor in bedroom. As per pts daughter Zora ([phone]) she last her her mother moving around in her room around 12pm. Zora states she realized she had not heard from her in a while so she went to check on her around 6pm and found her lying on her back with her feet at the door. As soon as she opened the door she was awake and said she was fine. Pt does not remember how she ended up on the floor or what she was doing prior to being on the floor. Reports no pain and feeling well now. Denies HA, dizziness, neck pain, cp, sob, palpitations, cough, abd pain, n/v. Pt not on any blood thinners. As per daughter pts last stroke was in Oct, seen at [facility], no residual deficits but has been slightly more confused since the stroke. Pts follows with cards and neuro at [facility].      In the ED, T(F): 97.3, HR: 68, BP: 146/85, RR: 20, SpO2: 100% on RA. Labs grossly normal. CTH unremarkable for hemorrhage. CTA H/N unremarkable. Patient admitted for management of mechanical fall. A 80 yo F with PMH of CVA x 3 (no residual deficits), HTN, HLD, asthma, COPD, scoliosis BIBEMS after found lying on floor in bedroom. As per daughter Zora ([phone]) she last saw her mother moving around in her room around 12pm. Zora states she realized she had not heard from her in awhile so she went to check on her around 6pm and found her lying on her back with her feet at the door. As soon as she opened the door she was awake and said she was fine. Patient denies trauma, but admits to LOC for a couple of seconds. Patient does not remember how she ended up on the floor or what she was doing prior to being on the floor. Reports no pain and feeling well now. Denies fevers, chills, CP, SOB, abdominal pain, N/V/D, urinary symptoms. As per daughter, last stroke was in Oct, seen at [facility], no residual deficits but has been slightly more confused since the stroke. Follows with cardiology and neurlogy at [facility].    In the ED, T(F): 97.3, HR: 68, BP: 146/85, RR: 20, SpO2: 100% on RA. Labs grossly normal. CTH unremarkable for hemorrhage. CTA H/N unremarkable. Patient admitted for management of mechanical fall.

## 2020-03-08 NOTE — H&P ADULT - NSICDXPASTMEDICALHX_GEN_ALL_CORE_FT
PAST MEDICAL HISTORY:  Asthma     Depression     GERD (gastroesophageal reflux disease)     High cholesterol     HTN (hypertension)     Hypothyroid     Primary biliary cirrhosis

## 2020-03-08 NOTE — PROGRESS NOTE ADULT - PROBLEM SELECTOR PLAN 5
History of HTN on Ramipril 10mg daily. => Lisinopril 40mg daily conversion  - Currently hypertensive.   - Continue medication.

## 2020-03-08 NOTE — PROGRESS NOTE ADULT - PROBLEM SELECTOR PLAN 7
Continue with Albuterol and Ventolin inhaler. Continue with Singulair 10mg daily.    #Primary Billary Cirrhosis  - Continue with Ursodiol 1000mg daily.

## 2020-03-08 NOTE — PHYSICAL THERAPY INITIAL EVALUATION ADULT - IMPAIRMENTS FOUND, PT EVAL
gait, locomotion, and balance/gross motor/aerobic capacity/endurance/muscle strength/poor safety awareness/arousal, attention, and cognition/cognitive impairment

## 2020-03-08 NOTE — CONSULT NOTE ADULT - SUBJECTIVE AND OBJECTIVE BOX
HPI:  A 78 yo F with PMH of CVA x 3 (no residual deficits), HTN, HLD, asthma, COPD, scoliosis BIBEMS after found lying on floor in bedroom. As per daughter Zora ([phone]) she last saw her mother moving around in her room around 12pm. Zora states she realized she had not heard from her in awhile so she went to check on her around 6pm and found her lying on her back with her feet at the door. As soon as she opened the door she was awake and said she was fine. Patient denies trauma, but admits to LOC for a couple of seconds. Patient does not remember how she ended up on the floor or what she was doing prior to being on the floor. Reports no pain and feeling well now. Denies fevers, chills, CP, SOB, abdominal pain, N/V/D, urinary symptoms. As per daughter, last stroke was in Oct, seen at [facility], no residual deficits but has been slightly more confused since the stroke. Follows with cardiology and neurlogy at [facility].    In the ED, T(F): 97.3, HR: 68, BP: 146/85, RR: 20, SpO2: 100% on RA. Labs grossly normal. CTH unremarkable for hemorrhage. CTA H/N unremarkable. Patient admitted for management of mechanical fall. (08 Mar 2020 01:08)      ROS: A 10-point review of systems was otherwise negative.    PAST MEDICAL & SURGICAL HISTORY:  Primary biliary cirrhosis  Depression  Hypothyroid  GERD (gastroesophageal reflux disease)  High cholesterol  HTN (hypertension)  Asthma  History of cholecystectomy  History of Nissen fundoplication      SOCIAL HISTORY:  FAMILY HISTORY:  No pertinent family history in first degree relatives      ALLERGIES: 	  Kiwi (Other)  No Known Drug Allergies  Nuts (Rash)  pitted fruits (Hives)            MEDICATIONS:  ALBUTerol    90 MICROgram(s) HFA Inhaler 2 Puff(s) Inhalation every 6 hours PRN  ALBUTerol   0.042% 1.25 milliGRAM(s) Nebulizer three times a day PRN  atorvastatin 20 milliGRAM(s) Oral at bedtime  enoxaparin Injectable 40 milliGRAM(s) SubCutaneous every 24 hours  influenza   Vaccine 0.5 milliLiter(s) IntraMuscular once  levothyroxine 88 MICROGram(s) Oral daily  lisinopril 40 milliGRAM(s) Oral every 24 hours  montelukast 10 milliGRAM(s) Oral daily  nystatin    Suspension 199998 Unit(s) Oral every 8 hours  ursodiol Tablet 1000 milliGRAM(s) Oral <User Schedule>      PHYSICAL EXAM:  T(C): 36.6 (03-08-20 @ 05:45), Max: 36.6 (03-08-20 @ 03:30)  HR: 63 (03-08-20 @ 05:45) (62 - 68)  BP: 174/91 (03-08-20 @ 05:45) (146/85 - 186/86)  RR: 15 (03-08-20 @ 05:45) (15 - 20)  SpO2: 97% (03-08-20 @ 05:45) (95% - 100%)  Wt(kg): --      GEN: Awake, comfortable. NAD.   HEENT: NCAT, PERRL, EOMI. Mucosa moist. No JVD.   RESP: CTA b/l  CV: RRR, normal s1/s2. No m/r/g.  ABD: Soft, NTND. BS+  EXT: Warm. No edema, clubbing, or cyanosis.   NEURO: AAOx3. No focal deficits.    I&O's Summary      Weight (kg): 36.1 (03-08 @ 03:30)  	  LABS:	 	    CARDIAC MARKERS:                                  11.6   6.57  )-----------( 248      ( 08 Mar 2020 06:11 )             36.3     03-07    145  |  104  |  15  ----------------------------<  125<H>  3.9   |  28  |  0.75    Ca    10.0      07 Mar 2020 20:17    TPro  7.0  /  Alb  4.0  /  TBili  0.7  /  DBili  x   /  AST  20  /  ALT  8<L>  /  AlkPhos  67  03-07    proBNP:   Lipid Profile:   HgA1c:   TSH:     TELEMETRY: 	    ECG:  	  RADIOLOGY:   ECHO:  STRESS:  CATH: HPI:  79F w/hx of CVA x 3 (no residual deficits), HTN, HLD, asthma, COPD, scoliosis BIBEMS after found lying on floor in bedroom. As per daughter Zora ([phone]) she last saw her mother moving around in her room around 12pm. Zora states she realized she had not heard from her in awhile so she went to check on her around 6pm and found her lying on her back with her feet at the door. As soon as she opened the door she was awake and said she was fine. Patient denies trauma, but admits to LOC for a couple of seconds. Patient does not remember how she ended up on the floor or what she was doing prior to being on the floor. Reports no pain and feeling well now. Denies fevers, chills, CP, SOB, abdominal pain, N/V/D, urinary symptoms. As per daughter, last stroke was in Oct, seen at [facility], no residual deficits but has been slightly more confused since the stroke. Follows with cardiology and neurlogy at [facility].    In the ED, T(F): 97.3, HR: 68, BP: 146/85, RR: 20, SpO2: 100% on RA. Labs grossly normal. CTH unremarkable for hemorrhage. CTA H/N unremarkable. Patient admitted for management of mechanical fall. (08 Mar 2020 01:08)    Cardiology consulted for syncope evaluation.    The patient was seen and examined at bedside. The patient does not remember how she ended up on the ground; she does not believe that she fell or suffered a LOC. She feels she most likely went to sleep on the ground. Denies any recent chest pain, palpitations, dizziness, syncope. Follows w/[facility] for cardiology care (Dr. Chery? [phone]). Denies any chronic cardiac conditions.       ROS: A 10-point review of systems was otherwise negative.    PAST MEDICAL & SURGICAL HISTORY:  Primary biliary cirrhosis  Depression  Hypothyroid  GERD (gastroesophageal reflux disease)  High cholesterol  HTN (hypertension)  Asthma  History of cholecystectomy  History of Nissen fundoplication      SOCIAL HISTORY: Former smoker, smoked 1PPD for 20 years, unclear when she quit. Denies alcohol or drug use  FAMILY HISTORY:  No pertinent family history in first degree relatives of early CAD      ALLERGIES: 	  Kiwi (Other)  No Known Drug Allergies  Nuts (Rash)  pitted fruits (Hives)            MEDICATIONS:  ALBUTerol    90 MICROgram(s) HFA Inhaler 2 Puff(s) Inhalation every 6 hours PRN  ALBUTerol   0.042% 1.25 milliGRAM(s) Nebulizer three times a day PRN  atorvastatin 20 milliGRAM(s) Oral at bedtime  enoxaparin Injectable 40 milliGRAM(s) SubCutaneous every 24 hours  influenza   Vaccine 0.5 milliLiter(s) IntraMuscular once  levothyroxine 88 MICROGram(s) Oral daily  lisinopril 40 milliGRAM(s) Oral every 24 hours  montelukast 10 milliGRAM(s) Oral daily  nystatin    Suspension 180932 Unit(s) Oral every 8 hours  ursodiol Tablet 1000 milliGRAM(s) Oral <User Schedule>      PHYSICAL EXAM:  T(C): 36.6 (03-08-20 @ 05:45), Max: 36.6 (03-08-20 @ 03:30)  HR: 63 (03-08-20 @ 05:45) (62 - 68)  BP: 174/91 (03-08-20 @ 05:45) (146/85 - 186/86)  RR: 15 (03-08-20 @ 05:45) (15 - 20)  SpO2: 97% (03-08-20 @ 05:45) (95% - 100%)  Wt(kg): --      GEN: Awake, comfortable. NAD. Frail   HEENT: NCAT, PERRL, EOMI. Mucosa dry. No JVD.   RESP: CTA b/l  CV: RRR, normal s1/s2. No m/r/g.  ABD: Soft, NTND. BS+  EXT: Warm. No edema, clubbing, or cyanosis.   NEURO: AAOx3. No focal deficits.    I&O's Summary      Weight (kg): 36.1 (03-08 @ 03:30)  	  LABS:	 	    CARDIAC MARKERS:                                  11.6   6.57  )-----------( 248      ( 08 Mar 2020 06:11 )             36.3     03-07    145  |  104  |  15  ----------------------------<  125<H>  3.9   |  28  |  0.75    Ca    10.0      07 Mar 2020 20:17    TPro  7.0  /  Alb  4.0  /  TBili  0.7  /  DBili  x   /  AST  20  /  ALT  8<L>  /  AlkPhos  67  03-07    proBNP:   Lipid Profile:   HgA1c:   TSH:     TELEMETRY: 	    ECG: NSR, LAD, LVH, PAC, motion artifact 	  RADIOLOGY:   ECHO:  STRESS:  CATH:

## 2020-03-08 NOTE — PROGRESS NOTE ADULT - PROBLEM SELECTOR PLAN 1
Patient with syncopal episode, with no trauma, but observed LOC. Does not recall falling. Review of medication list does not warrant a drug induced association. No prodromal symptoms recalled. No history of arrythmia, none present on admission. CE negative. Ddx includes seizure vs.  vasovagal vs TIA  - CTH/CTA negative, consider MRI head  - Daily EKG  - Cardiology consulted, f/u recommendations  - Obtain official ECHO   - Obtain collateral from outpatient cardiologist       #Fall   Found to be lying on the floor by daughter, no trauma, with LOC. 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   - CTH and CTA H/N unremarkable.  - Fully functional female at home, uses cane. Daughter is caretaker. No HHA.   - Physical Therapy  - Social Work

## 2020-03-08 NOTE — H&P ADULT - NSHPLABSRESULTS_GEN_ALL_CORE
LABS:                        12.1   9.51  )-----------( 269      ( 07 Mar 2020 20:17 )             38.3     03-07    145  |  104  |  15  ----------------------------<  125<H>  3.9   |  28  |  0.75    Ca    10.0      07 Mar 2020 20:17    TPro  7.0  /  Alb  4.0  /  TBili  0.7  /  DBili  x   /  AST  20  /  ALT  8<L>  /  AlkPhos  67  03-07    PT/INR - ( 07 Mar 2020 20:17 )   PT: 12.3 sec;   INR: 1.08          PTT - ( 07 Mar 2020 20:17 )  PTT:29.0 sec  Urinalysis Basic - ( 07 Mar 2020 22:32 )    Color: Yellow / Appearance: Clear / S.015 / pH: x  Gluc: x / Ketone: NEGATIVE  / Bili: Negative / Urobili: 0.2 E.U./dL   Blood: x / Protein: NEGATIVE mg/dL / Nitrite: NEGATIVE   Leuk Esterase: NEGATIVE / RBC: < 5 /HPF / WBC < 5 /HPF   Sq Epi: x / Non Sq Epi: 0-5 /HPF / Bacteria: Present /HPF        RADIOLOGY & ADDITIONAL TESTS:    < from: CT Head No Cont (20 @ 21:20) >    IMPRESSION:   No acute intracranial hemorrhage or calvarial fracture.    Small vessel ischemic changes.    < end of copied text >    < from: CT Angio Neck w/ IV Cont (20 @ 21:21) >    IMPRESSION: Unremarkable CTA examination of the brain.    < end of copied text >

## 2020-03-08 NOTE — PHYSICAL THERAPY INITIAL EVALUATION ADULT - ORIENTATION, REHAB EVAL
unable to recall events leading to hospitalization although she stated she just remembers what she was doing "ands it's embarrassing"/person/place/time

## 2020-03-08 NOTE — H&P ADULT - PROBLEM SELECTOR PLAN 6
DVT PPX:  ETHICS: Full Code  DISPOSITION: [facility] DVT PPX: Lovenox  ETHICS: Full Code  DISPOSITION: [email or web address] Continue with Albuterol and Ventolin inhaler. Continue with Singulair 10mg daily. Continue with Albuterol and Ventolin inhaler. Continue with Singulair 10mg daily.    #Primary Billiary Cirrhosis  - Continue with Ursodiol 1000mg daily. Continue with Albuterol and Ventolin inhaler. Continue with Singulair 10mg daily.    #Primary Billary Cirrhosis  - Continue with Ursodiol 1000mg daily. History of hypothyroidism, on Synthroid 88mcg daily.   - f/u TSH  - Continue with medications.    #HLD  History of HLD on Lipitor 20mg daily.  - f/u Lipid Profile  - Continue medication.

## 2020-03-08 NOTE — H&P ADULT - PROBLEM SELECTOR PLAN 1
- Physical Therapy  - Social Work Found to be lying on the floor by daughter, no trauma, with LOC. 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   - CTH and CTA H/N unremarkable.  - Fully functional female at home, uses cane.   - Physical Therapy  - Social Work Found to be lying on the floor by daughter, no trauma, with LOC. 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   - CTH and CTA H/N unremarkable.  - Fully functional female at home, uses cane. Daughter is caretaker. No HHA.   - Physical Therapy  - Social Work Patient with syncopal episode, with no trauma, but observed LOC. Does not recall falling. Review of medication list does not warrant a drug induced association. No prodromal symptoms recalled. No history of arrythmia.   - Non-neurological unlikely as CTH and CTA H/N unremarkable.   - EKG NSR however, will obtain ECHO to r/o other cardiac etiologies, cardiac enzymes negative  - f/u Orthostatic BP  - f/u TSH, Lipid Profile  - f/u AM EKG  - Cardiology consulted, f/u recommendations

## 2020-03-08 NOTE — PHYSICAL THERAPY INITIAL EVALUATION ADULT - ADDITIONAL COMMENTS
Quin is RHD, wears "readers" stated she has fallen multiple times in the past 6 months although this time was different because she "didn't fall" and she was "unable to get back up this time." Ambulating with a tripod cane, denies use of other DME, Leaves the house ~1x/week could be unattended although she always leaves with someone, no formal therex routine, stating she had tried to have her daughter become her HHA although she was told she makes too much money. Patient endorsed remaining in bad a good portion of the day.

## 2020-03-08 NOTE — H&P ADULT - ATTENDING COMMENTS
patient seen and examined a/f fall   reviewed pertinent data, h&p    1. Fall    2.     rest of plan as above patient seen and examined a/f syncope / fall   reviewed pertinent data, h&p    1. Syncope / Fall      rest of plan as above patient seen and examined a/f syncope / fall   reviewed pertinent data, h&p    1. Syncope / Fall    2. LOW: reports loss of weight recently, will need further w/u as outtpt.   3. oral thrush    rest of plan as above patient seen and examined a/f syncope / fall ; pt reported being found on floor by daughter, unable to recall events leading to fall. denies chest pain, dyspnea; per chart review, pt admitted to cardiology service twice in 2016 for similar episodes of syncope    reviewed pertinent data, h&p    PE findings as above, thin female, NAD, oriented x 3;  in addition pt w/ faint LUSB/ LLSB systolic murmur  1. Syncope / Fall  : cause unclear, ordered for ECHO , orthostatics, followup cards recs; PT evaluation   2. LOW: reports loss of weight recently, will need further w/u as outtpt.   3. oral thrush    rest of plan as above patient seen and examined a/f syncope / fall ; pt reported being found on floor by daughter, unable to recall events leading to fall. denies chest pain, dyspnea; per chart review, pt admitted to cardiology service twice in 2016 for similar episodes of syncope    reviewed pertinent data, h&p    PE findings as above, thin female, NAD, oriented x 3;  in addition pt w/ ?faint LUSB/ LLSB systolic murmur  1. Syncope / Fall  : cause unclear, ordered for ECHO , orthostatics, followup cards recs; PT evaluation   2. LOW: reports loss of weight recently, will need further w/u as outtpt.   3. oral thrush: started on nystatin     rest of plan as above

## 2020-03-08 NOTE — H&P ADULT - PROBLEM SELECTOR PLAN 2
History of hypothyroidism, on Synthroid 88mcg daily.   - f/u TSH  - Continue with medications. Patient with syncopal episode, with no trauma, but observed LOC. Does not recall falling. Review of medication list does not warrant a drug induced association.   - Non-neurological in nature as CTH and CTA H/N unremarkable.  - EKG NSR however, will obtain ECHO to r/o other cardiac etiologies  - f/u Orthostatic BP  - f/u TSH, Lipid Profile  - f/u AM EKG Patient with syncopal episode, with no trauma, but observed LOC. Does not recall falling. Review of medication list does not warrant a drug induced association. No prodromal symptoms recalled. No history of arrythmia.   - Non-neurological unlikely as CTH and CTA H/N unremarkable.  - EKG NSR however, will obtain ECHO to r/o other cardiac etiologies  - f/u Orthostatic BP  - f/u TSH, Lipid Profile  - f/u AM EKG Patient with syncopal episode, with no trauma, but observed LOC. Does not recall falling. Review of medication list does not warrant a drug induced association. No prodromal symptoms recalled. No history of arrythmia.   - Non-neurological unlikely as CTH and CTA H/N unremarkable.   - EKG NSR however, will obtain ECHO to r/o other cardiac etiologies, cardiac enzymes negative  - f/u Orthostatic BP  - f/u TSH, Lipid Profile  - f/u AM EKG  - Cardiology consulted, f/u recommendations Found to be lying on the floor by daughter, no trauma, with LOC. 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   - CTH and CTA H/N unremarkable.  - Fully functional female at home, uses cane. Daughter is caretaker. No HHA.   - Physical Therapy  - Social Work

## 2020-03-08 NOTE — CONSULT NOTE ADULT - ATTENDING COMMENTS
On Date 3/8/20  Assessment: Patient personally seen and examined myself, face-to-face, during rounds with the Resident     Note read, including vitals, physical findings, laboratory data, and radiological reports.   Agree with above.

## 2020-03-08 NOTE — H&P ADULT - PROBLEM SELECTOR PLAN 4
History of HTN on History of HTN on Ramipril 10mg daily. History of HTN on Ramipril 10mg daily. => Lisinopril 40mg daily conversion  - Currently hypertensive.   - Continue medication. History of HLD on Lipitor 20mg daily.  - f/u Lipid Profile  - Continue medication. Start Nystatin Swish and Swallow 500,000units q8h; swish in the mouth and retain for as long as possible (several minutes) before swallowing

## 2020-03-08 NOTE — CONSULT NOTE ADULT - ASSESSMENT
79F w/hx of CVA x 3 (no residual deficits), HTN, HLD, asthma, COPD, scoliosis admitted after being found on the ground. cardiology consulted to evaluate for syncope.    #Syncope - patient denying any LOC, any recent syncopal events. Orthostatic (-)  - non-urgent echocardiogram  - obtain records from outpt cardiologist  - daily ECG  - monitor volume status    Will d/w attending.    --  Jerrod Hernandez MD  Cardiology PGY4 79F w/hx of CVA x 3 (no residual deficits), HTN, HLD, asthma, COPD, scoliosis admitted after being found on the ground. cardiology consulted to evaluate for syncope.    #Syncope - patient denying any LOC, any recent syncopal events. Orthostatic (-)  - non-urgent echocardiogram  - obtain records from outpt cardiologist  - daily ECG  - monitor volume status  - can consider event monitor prior to discharge    Will d/w attending.    --  Jerrod Hernandez MD  Cardiology PGY4

## 2020-03-08 NOTE — PROGRESS NOTE ADULT - ASSESSMENT
A 80 yo F with PMH of CVA x 3 (no residual deficits), HTN, HLD, asthma, COPD, scoliosis BIBEMS after found lying on floor in bedroom, with LOC, no trauma, admitted for syncope and fall. Differential includes seizure vs. vasovagal vs. TIA.

## 2020-03-08 NOTE — PROGRESS NOTE ADULT - PROBLEM SELECTOR PLAN 6
History of hypothyroidism, on Synthroid 88mcg daily. TSH normal   - Continue with medications    #HLD  History of HLD on Lipitor 20mg daily. Lipid profile WNL  - Continue medication.

## 2020-03-08 NOTE — H&P ADULT - ASSESSMENT
A 80 yo F with PMH of CVA x 3 (no residual deficits), HTN, HLD, asthma, COPD, scoliosis BIBEMS after found lying on floor in bedroom, admitted for mechanical fall. A 78 yo F with PMH of CVA x 3 (no residual deficits), HTN, HLD, asthma, COPD, scoliosis BIBEMS after found lying on floor in bedroom, with LOC, no trauma, admitted for syncope and fall.

## 2020-03-08 NOTE — H&P ADULT - PROBLEM SELECTOR PLAN 5
F: None  E: Replete electrolytes PRN  N: DASH/TLC Continue with Ursodiol 1000mg daily. History of HTN on Ramipril 10mg daily. => Lisinopril 40mg daily conversion  - Currently hypertensive.   - Continue medication.

## 2020-03-08 NOTE — H&P ADULT - NSHPSOCIALHISTORY_GEN_ALL_CORE
Cigarette Use:  RDU:  ETOH: Cigarette Use: Former smoker, quit 1996, 1PPD  RDU: Denies  ETOH: Minimal

## 2020-03-08 NOTE — H&P ADULT - NSHPPHYSICALEXAM_GEN_ALL_CORE
Vital Signs Last 12 Hrs  T(F): 97.3 (03-07-20 @ 19:33), Max: 97.3 (03-07-20 @ 19:33)  HR: 68 (03-07-20 @ 19:33) (68 - 68)  BP: 146/85 (03-07-20 @ 19:33) (146/85 - 146/85)  BP(mean): --  RR: 20 (03-07-20 @ 19:33) (20 - 20)  SpO2: 100% (03-07-20 @ 19:33) (100% - 100%)  I&O's Summary      PHYSICAL EXAM:    Constitutional: NAD, AAOx3, comfortable in bed.   Respiratory: Normal rate, rhythm, depth, effort. CTAB. No w/r/r.   Cardiovascular: RRR, normal S1 and S2, no m/r/g.   Gastrointestinal: +BS, soft NTND.   Extremities: wwp, no edema.   Vascular: Pulses equal and strong throughout.   Neurological: Cranial nerves grossly intact, strength and sensation intact throughout.   Skin: No gross skin abnormalities. Vital Signs Last 12 Hrs  T(F): 97.3 (03-07-20 @ 19:33), Max: 97.3 (03-07-20 @ 19:33)  HR: 68 (03-07-20 @ 19:33) (68 - 68)  BP: 146/85 (03-07-20 @ 19:33) (146/85 - 146/85)  BP(mean): --  RR: 20 (03-07-20 @ 19:33) (20 - 20)  SpO2: 100% (03-07-20 @ 19:33) (100% - 100%)  I&O's Summary      PHYSICAL EXAM:    Constitutional: NAD, AAOx3, comfortable in bed.   HEENT: PERRL, EOMI, anicteric, dry MM, oral thrush, neck supple, no JVD  Respiratory: Normal rate, rhythm, depth, effort. CTAB. No w/r/r.   Cardiovascular: RRR, normal S1 and S2, no m/r/g.   Gastrointestinal: +BS, soft NTND. No guarding or rigidity.   Extremities: wwp, no edema.   Vascular: Pulses equal and strong throughout.   Neurological: Cranial nerves grossly intact, strength and sensation intact throughout.   Skin: No gross skin abnormalities.

## 2020-03-08 NOTE — PROGRESS NOTE ADULT - SUBJECTIVE AND OBJECTIVE BOX
CIELO SERRANO  79y  Female    Patient is a 79y old  Female who presents with a chief complaint of Fall (08 Mar 2020 06:40)      INTERVAL HPI/OVERNIGHT EVENTS: Patient seen this am and has no complaints. Pt has no recollection of the event. Daughter was contacted and told author that the patient was very lethargic s/p finding her down and was found to have urine on her. Daughter gave the author information for the patient's cardiologist and neurologist.       T(C): 36.5 (20 @ 12:02), Max: 36.6 (20 @ 03:30)  HR: 61 (20 @ 12:02) (61 - 68)  BP: 124/77 (20 @ 12:02) (124/77 - 186/86)  RR: 16 (20 @ 12:02) (15 - 20)  SpO2: 96% (20 @ 12:02) (95% - 100%)  Wt(kg): --Vital Signs Last 24 Hrs  T(C): 36.5 (08 Mar 2020 12:02), Max: 36.6 (08 Mar 2020 03:30)  T(F): 97.7 (08 Mar 2020 12:02), Max: 97.9 (08 Mar 2020 05:45)  HR: 61 (08 Mar 2020 12:02) (61 - 68)  BP: 124/77 (08 Mar 2020 12:02) (124/77 - 186/86)  BP(mean): --  RR: 16 (08 Mar 2020 12:02) (15 - 20)  SpO2: 96% (08 Mar 2020 12:02) (95% - 100%)    PHYSICAL EXAM:  GENERAL: NAD  HEAD: Atraumatic, Normocephalic  EYES: Conjunctiva and sclera clear  ENMT: Moist mucous membranes  NECK: Supple, No JVD  NERVOUS SYSTEM:  Alert & Oriented X3. CN II-XII intact with no focal deficits   CHEST/LUNG: Clear to auscultation bilaterally; No rales, rhonchi, wheezing, or rubs  HEART: Regular rate and rhythm; No murmurs, rubs, or gallops  ABDOMEN: Soft, Nontender, Nondistended; Bowel sounds present  EXTREMITIES: WWP, No clubbing, cyanosis, or edema  Vascular: 2+ peripheral pulses x4    Consultant(s) Notes Reviewed:  [x ] YES  [ ] NO  Care Discussed with Consultants/Other Providers [ x] YES  [ ] NO    LABS:                        11.6   6.57  )-----------( 248      ( 08 Mar 2020 06:11 )             36.3     03-08    141  |  104  |  15  ----------------------------<  103<H>  3.7   |  27  |  1.04    Ca    9.0      08 Mar 2020 06:11  Mg     2.2     03-08    TPro  7.0  /  Alb  4.0  /  TBili  0.7  /  DBili  x   /  AST  20  /  ALT  8<L>  /  AlkPhos  67  03-07      PT/INR - ( 07 Mar 2020 20:17 )   PT: 12.3 sec;   INR: 1.08          PTT - ( 07 Mar 2020 20:17 )  PTT:29.0 sec  Urinalysis Basic - ( 07 Mar 2020 22:32 )    Color: Yellow / Appearance: Clear / S.015 / pH: x  Gluc: x / Ketone: NEGATIVE  / Bili: Negative / Urobili: 0.2 E.U./dL   Blood: x / Protein: NEGATIVE mg/dL / Nitrite: NEGATIVE   Leuk Esterase: NEGATIVE / RBC: < 5 /HPF / WBC < 5 /HPF   Sq Epi: x / Non Sq Epi: 0-5 /HPF / Bacteria: Present /HPF      CAPILLARY BLOOD GLUCOSE      POCT Blood Glucose.: 100 mg/dL (07 Mar 2020 19:40)        Urinalysis Basic - ( 07 Mar 2020 22:32 )    Color: Yellow / Appearance: Clear / S.015 / pH: x  Gluc: x / Ketone: NEGATIVE  / Bili: Negative / Urobili: 0.2 E.U./dL   Blood: x / Protein: NEGATIVE mg/dL / Nitrite: NEGATIVE   Leuk Esterase: NEGATIVE / RBC: < 5 /HPF / WBC < 5 /HPF   Sq Epi: x / Non Sq Epi: 0-5 /HPF / Bacteria: Present /HPF        RADIOLOGY & ADDITIONAL TESTS:    Imaging Personally Reviewed:  [ ] YES  [ ] NO    HEALTH ISSUES - PROBLEM Dx:  Loss of weight: Loss of weight  Syncope, unspecified syncope type: Syncope, unspecified syncope type  Oral thrush: Oral thrush  Asthma: Asthma  Primary biliary cirrhosis: Primary biliary cirrhosis  Transition of care performed with sharing of clinical summary: Transition of care performed with sharing of clinical summary  Prophylactic measure: Prophylactic measure  Nutrition, metabolism, and development symptoms: Nutrition, metabolism, and development symptoms  HTN (hypertension): HTN (hypertension)  Hyperlipidemia: Hyperlipidemia  Hypothyroid: Hypothyroid  Fall, initial encounter: Fall, initial encounter

## 2020-03-08 NOTE — PROGRESS NOTE ADULT - PROBLEM SELECTOR PLAN 4
Start Nystatin Swish and Swallow 500,000units q8h; swish in the mouth and retain for as long as possible (several minutes) before swallowing

## 2020-03-08 NOTE — H&P ADULT - PROBLEM SELECTOR PLAN 7
1) PCP Contacted on Admission: (Y/N) --> Name & Phone #:  2) Date of Contact with PCP:  3) PCP Contacted at Discharge: (Y/N, N/A)  4) Summary of Handoff Given to PCP:   5) Post-Discharge Appointment Date and Location: Start Nystatin Swish and Swallow 500,000units q8h; swish in the mouth and retain for as long as possible (several minutes) before swallowing Continue with Albuterol and Ventolin inhaler. Continue with Singulair 10mg daily.    #Primary Billary Cirrhosis  - Continue with Ursodiol 1000mg daily.

## 2020-03-08 NOTE — PHYSICAL THERAPY INITIAL EVALUATION ADULT - GENERAL OBSERVATIONS, REHAB EVAL
Patient received soiled urinary incontinence, (+) vEEG (+) heplock tolerating hallway ambulation, anticipate HPT with supervision for safety

## 2020-03-09 LAB
ALBUMIN SERPL ELPH-MCNC: 3.3 G/DL — SIGNIFICANT CHANGE UP (ref 3.3–5)
ALP SERPL-CCNC: 54 U/L — SIGNIFICANT CHANGE UP (ref 40–120)
ALT FLD-CCNC: 6 U/L — LOW (ref 10–45)
ANION GAP SERPL CALC-SCNC: 7 MMOL/L — SIGNIFICANT CHANGE UP (ref 5–17)
AST SERPL-CCNC: 19 U/L — SIGNIFICANT CHANGE UP (ref 10–40)
BILIRUB SERPL-MCNC: 0.6 MG/DL — SIGNIFICANT CHANGE UP (ref 0.2–1.2)
BUN SERPL-MCNC: 22 MG/DL — SIGNIFICANT CHANGE UP (ref 7–23)
CALCIUM SERPL-MCNC: 8.9 MG/DL — SIGNIFICANT CHANGE UP (ref 8.4–10.5)
CHLORIDE SERPL-SCNC: 104 MMOL/L — SIGNIFICANT CHANGE UP (ref 96–108)
CO2 SERPL-SCNC: 27 MMOL/L — SIGNIFICANT CHANGE UP (ref 22–31)
CREAT SERPL-MCNC: 0.83 MG/DL — SIGNIFICANT CHANGE UP (ref 0.5–1.3)
CULTURE RESULTS: SIGNIFICANT CHANGE UP
GLUCOSE SERPL-MCNC: 96 MG/DL — SIGNIFICANT CHANGE UP (ref 70–99)
HBA1C BLD-MCNC: 4.8 % — SIGNIFICANT CHANGE UP (ref 4–5.6)
HCT VFR BLD CALC: 34.1 % — LOW (ref 34.5–45)
HGB BLD-MCNC: 10.8 G/DL — LOW (ref 11.5–15.5)
MAGNESIUM SERPL-MCNC: 2.2 MG/DL — SIGNIFICANT CHANGE UP (ref 1.6–2.6)
MCHC RBC-ENTMCNC: 27.4 PG — SIGNIFICANT CHANGE UP (ref 27–34)
MCHC RBC-ENTMCNC: 31.7 GM/DL — LOW (ref 32–36)
MCV RBC AUTO: 86.5 FL — SIGNIFICANT CHANGE UP (ref 80–100)
NRBC # BLD: 0 /100 WBCS — SIGNIFICANT CHANGE UP (ref 0–0)
PLATELET # BLD AUTO: 245 K/UL — SIGNIFICANT CHANGE UP (ref 150–400)
POTASSIUM SERPL-MCNC: 4.2 MMOL/L — SIGNIFICANT CHANGE UP (ref 3.5–5.3)
POTASSIUM SERPL-SCNC: 4.2 MMOL/L — SIGNIFICANT CHANGE UP (ref 3.5–5.3)
PROT SERPL-MCNC: 6.1 G/DL — SIGNIFICANT CHANGE UP (ref 6–8.3)
RBC # BLD: 3.94 M/UL — SIGNIFICANT CHANGE UP (ref 3.8–5.2)
RBC # FLD: 15 % — HIGH (ref 10.3–14.5)
SODIUM SERPL-SCNC: 138 MMOL/L — SIGNIFICANT CHANGE UP (ref 135–145)
SPECIMEN SOURCE: SIGNIFICANT CHANGE UP
WBC # BLD: 7.8 K/UL — SIGNIFICANT CHANGE UP (ref 3.8–10.5)
WBC # FLD AUTO: 7.8 K/UL — SIGNIFICANT CHANGE UP (ref 3.8–10.5)

## 2020-03-09 PROCEDURE — 93010 ELECTROCARDIOGRAM REPORT: CPT

## 2020-03-09 PROCEDURE — 99233 SBSQ HOSP IP/OBS HIGH 50: CPT | Mod: GC

## 2020-03-09 PROCEDURE — 95720 EEG PHY/QHP EA INCR W/VEEG: CPT

## 2020-03-09 PROCEDURE — 93306 TTE W/DOPPLER COMPLETE: CPT | Mod: 26

## 2020-03-09 PROCEDURE — 99231 SBSQ HOSP IP/OBS SF/LOW 25: CPT

## 2020-03-09 RX ORDER — NYSTATIN 500MM UNIT
500000 POWDER (EA) MISCELLANEOUS EVERY 6 HOURS
Refills: 0 | Status: DISCONTINUED | OUTPATIENT
Start: 2020-03-09 | End: 2020-03-10

## 2020-03-09 RX ADMIN — Medication 88 MICROGRAM(S): at 06:52

## 2020-03-09 RX ADMIN — PANTOPRAZOLE SODIUM 40 MILLIGRAM(S): 20 TABLET, DELAYED RELEASE ORAL at 06:33

## 2020-03-09 RX ADMIN — Medication 500000 UNIT(S): at 06:33

## 2020-03-09 RX ADMIN — ATORVASTATIN CALCIUM 20 MILLIGRAM(S): 80 TABLET, FILM COATED ORAL at 21:33

## 2020-03-09 RX ADMIN — Medication 500000 UNIT(S): at 18:21

## 2020-03-09 RX ADMIN — LISINOPRIL 40 MILLIGRAM(S): 2.5 TABLET ORAL at 06:33

## 2020-03-09 RX ADMIN — MONTELUKAST 10 MILLIGRAM(S): 4 TABLET, CHEWABLE ORAL at 11:35

## 2020-03-09 RX ADMIN — ENOXAPARIN SODIUM 40 MILLIGRAM(S): 100 INJECTION SUBCUTANEOUS at 06:33

## 2020-03-09 RX ADMIN — URSODIOL 1000 MILLIGRAM(S): 250 TABLET, FILM COATED ORAL at 13:19

## 2020-03-09 NOTE — OCCUPATIONAL THERAPY INITIAL EVALUATION ADULT - MANUAL MUSCLE TESTING RESULTS, REHAB EVAL
BUE grossly 4+/5 throughout. Smile symmetrical, tongue protrusion in midline, cheeks puff and eyebrows elevation grossly intact.

## 2020-03-09 NOTE — PROGRESS NOTE ADULT - SUBJECTIVE AND OBJECTIVE BOX
Chief Complaint/Reason for Consult: syncope  INTERVAL HPI: nad o/n   	  MEDICATIONS:  lisinopril 40 milliGRAM(s) Oral every 24 hours    nystatin    Suspension 918167 Unit(s) Oral every 8 hours    ALBUTerol    90 MICROgram(s) HFA Inhaler 2 Puff(s) Inhalation every 6 hours PRN  ALBUTerol   0.042% 1.25 milliGRAM(s) Nebulizer three times a day PRN  montelukast 10 milliGRAM(s) Oral daily      pantoprazole    Tablet 40 milliGRAM(s) Oral before breakfast  ursodiol Tablet 1000 milliGRAM(s) Oral <User Schedule>    atorvastatin 20 milliGRAM(s) Oral at bedtime  levothyroxine 88 MICROGram(s) Oral daily    enoxaparin Injectable 40 milliGRAM(s) SubCutaneous every 24 hours  influenza   Vaccine 0.5 milliLiter(s) IntraMuscular once      REVIEW OF SYSTEMS:  [x] As per HPI  CONSTITUTIONAL: No fever, weight loss, or fatigue  RESPIRATORY: No cough, wheezing, chills or hemoptysis; No Shortness of Breath  CARDIOVASCULAR: No chest pain, palpitations, dizziness, or leg swelling  GASTROINTESTINAL: No abdominal or epigastric pain. No nausea, vomiting, or hematemesis; No diarrhea or constipation. No melena or hematochezia.  MUSCULOSKELETAL: No joint pain or swelling; No muscle, back, or extremity pain  [x] All others negative	  [ ] Unable to obtain    PHYSICAL EXAM:  T(C): 36.5 (03-09-20 @ 05:10), Max: 36.5 (03-08-20 @ 12:02)  HR: 64 (03-09-20 @ 05:10) (61 - 90)  BP: 144/80 (03-09-20 @ 05:10) (113/81 - 144/80)  RR: 15 (03-09-20 @ 05:10) (15 - 16)  SpO2: 95% (03-09-20 @ 05:10) (95% - 97%)  Wt(kg): --  I&O's Summary        Appearance: Normal	  HEENT:   Normal oral mucosa  Cardiovascular: Normal S1 S2, No JVD, No murmurs, No edema  Respiratory: Lungs clear to auscultation	  Gastrointestinal:  Soft, Non-tender, + BS	  Extremities: Normal range of motion, No clubbing, cyanosis or edema  Vascular: Peripheral pulses palpable 2+ bilaterally    TELEMETRY: 	    ECG:   	  RADIOLOGY:   CXR:  CT:  US:    CARDIAC TESTING:  Echocardiogram:  Catheterization:  Stress Test:      LABS:	 	    CARDIAC MARKERS:                                  10.8   7.80  )-----------( 245      ( 09 Mar 2020 05:50 )             34.1     03-09    138  |  104  |  22  ----------------------------<  96  4.2   |  27  |  0.83    Ca    8.9      09 Mar 2020 05:50  Mg     2.2     03-09    TPro  6.1  /  Alb  3.3  /  TBili  0.6  /  DBili  x   /  AST  19  /  ALT  6<L>  /  AlkPhos  54  03-09    proBNP:   Lipid Profile:   HgA1c: Hemoglobin A1C, Whole Blood: 4.8 % (03-09 @ 05:50)    TSH:     ASSESSMENT/PLAN: 	    Interval events - consider outpatient holter/event monitor/ltm, check 2d echo and daily ecg, replete electrolytes, encourage po    #Syncope - patient denying any LOC, any recent syncopal events. Orthostatic (-)  - non-urgent echocardiogram  - obtain records from outpt cardiologist  - daily ECG  - monitor volume status  - can consider event monitor prior to discharge

## 2020-03-09 NOTE — CONSULT NOTE ADULT - SUBJECTIVE AND OBJECTIVE BOX
Patient is a 79y old  Female who presents with a chief complaint of Fall (09 Mar 2020 07:56)       HPI:  A 78 yo F with PMH of CVA x 3 (no residual deficits), HTN, HLD, asthma, COPD, scoliosis BIBEMS after found lying on floor in bedroom. As per daughter Zora ([phone]) she last saw her mother moving around in her room around 12pm. Zora states she realized she had not heard from her in awhile so she went to check on her around 6pm and found her lying on her back with her feet at the door. As soon as she opened the door she was awake and said she was fine. Patient denies trauma, but admits to LOC for a couple of seconds. Patient does not remember how she ended up on the floor or what she was doing prior to being on the floor. Reports no pain and feeling well now. Denies fevers, chills, CP, SOB, abdominal pain, N/V/D, urinary symptoms. As per daughter, last stroke was in Oct, seen at [facility], no residual deficits but has been slightly more confused since the stroke. Follows with cardiology and neurlogy at [facility].    In the ED, T(F): 97.3, HR: 68, BP: 146/85, RR: 20, SpO2: 100% on RA. Labs grossly normal. CTH unremarkable for hemorrhage. CTA H/N unremarkable. Patient admitted for management of mechanical fall. (08 Mar 2020 01:08)      PAST MEDICAL & SURGICAL HISTORY:  Primary biliary cirrhosis  Depression  Hypothyroid  GERD (gastroesophageal reflux disease)  High cholesterol  HTN (hypertension)  Asthma  S/P cataract surgery  H/O knee surgery  History of cholecystectomy  History of Nissen fundoplication      MEDICATIONS  (STANDING):  atorvastatin 20 milliGRAM(s) Oral at bedtime  enoxaparin Injectable 40 milliGRAM(s) SubCutaneous every 24 hours  influenza   Vaccine 0.5 milliLiter(s) IntraMuscular once  levothyroxine 88 MICROGram(s) Oral daily  lisinopril 40 milliGRAM(s) Oral every 24 hours  montelukast 10 milliGRAM(s) Oral daily  nystatin    Suspension 606026 Unit(s) Oral every 8 hours  pantoprazole    Tablet 40 milliGRAM(s) Oral before breakfast  ursodiol Tablet 1000 milliGRAM(s) Oral <User Schedule>    MEDICATIONS  (PRN):  ALBUTerol   0.042% 1.25 milliGRAM(s) Nebulizer three times a day PRN Shortness of Breath and/or Wheezing        FAMILY HISTORY:  FH: myocardial infarction: father  Family history of CVA: mother      CBC Full  -  ( 09 Mar 2020 05:50 )  WBC Count : 7.80 K/uL  RBC Count : 3.94 M/uL  Hemoglobin : 10.8 g/dL  Hematocrit : 34.1 %  Platelet Count - Automated : 245 K/uL  Mean Cell Volume : 86.5 fl  Mean Cell Hemoglobin : 27.4 pg  Mean Cell Hemoglobin Concentration : 31.7 gm/dL  Auto Neutrophil # : x  Auto Lymphocyte # : x  Auto Monocyte # : x  Auto Eosinophil # : x  Auto Basophil # : x  Auto Neutrophil % : x  Auto Lymphocyte % : x  Auto Monocyte % : x  Auto Eosinophil % : x  Auto Basophil % : x          138  |  104  |  22  ----------------------------<  96  4.2   |  27  |  0.83    Ca    8.9      09 Mar 2020 05:50  Mg     2.2         TPro  6.1  /  Alb  3.3  /  TBili  0.6  /  DBili  x   /  AST  19  /  ALT  6<L>  /  AlkPhos  54        Urinalysis Basic - ( 07 Mar 2020 22:32 )    Color: Yellow / Appearance: Clear / S.015 / pH: x  Gluc: x / Ketone: NEGATIVE  / Bili: Negative / Urobili: 0.2 E.U./dL   Blood: x / Protein: NEGATIVE mg/dL / Nitrite: NEGATIVE   Leuk Esterase: NEGATIVE / RBC: < 5 /HPF / WBC < 5 /HPF   Sq Epi: x / Non Sq Epi: 0-5 /HPF / Bacteria: Present /HPF          Radiology:    < from: CT Head No Cont (20 @ 21:20) >    EXAM:  CT BRAIN                          PROCEDURE DATE:  2020          INTERPRETATION:  INDICATIONS: Fall vs syncope, history of CVA    TECHNIQUE: Serial axial images were obtained from the skull base to the vertex without the use of intravenous contrast. Sagittal and coronal reformats were also reviewed.    COMPARISON EXAMINATION: None.    FINDINGS:    VENTRICLES AND SULCI: Age appropriate parenchymal volume loss. No hydrocephalus.  INTRA-AXIAL: No mass effect, acute hemorrhage, or midline shift. There is preservation of gray-white matter differentiation without CT evidence of acute transcortical infarction. Patchy areas of hypodensity within the periventricular and subcortical white matter consistent with small vessel ischemic changes.  EXTRA-AXIAL: No extra-axial fluid collection is present.   VISUALIZED SINUSES: Imaged portions of paranasal sinuses are well aerated.  VISUALIZED MASTOIDS: Well-aerated.  CALVARIUM: No fracture.  MISCELLANEOUS: Native intraocular lenses have been replaced bilaterally.    IMPRESSION:   No acute intracranial hemorrhage or calvarial fracture.    Small vessel ischemic changes.      < from: CT Angio Head w/ IV Cont (20 @ 21:21) >  EXAM:  CT ANGIO NECK (W)AW IC                          EXAM:  CT ANGIO BRAIN (W)AW IC                          PROCEDURE DATE:  2020          INTERPRETATION:  PROCEDURE: CTA brain with intravenous contrast.    INDICATION: Fall versus syncope    TECHNIQUE: Multiple axial thin section were obtained through the [place] following the intravenous bolus injection of 80 mL Optiray 350 . MIP series are provided.    COMPARISON: None    FINDINGS: There is moderate calcified atheroscleroticplaque within the cavernous and supraclinoid portions of the bilateral internal carotid arteries without high-grade stenosis or occlusion.    The anterior and middle cerebral arteries are patent at their 1st and 2nd order segments. There is a hypoplastic right A1 segment, a normal anatomic variant.    The posterior circulation shows no high grade stenosis or occlusion. The intracranial vertebral arteries, the basilar artery and both posterior cerebral arteries are patent.     There is no site of aneurysm within the resolution limitations of CTA.    IMPRESSION: Unremarkable CTA examination of the brain.      PROCEDURE: CTA neck with intravenous contrast.    INDICATION: Fall versus syncope    TECHNIQUE: Multiple axial thin section were obtainedthrough the neck following the intravenous bolus injection of contrast as above. MIP series are provided.    COMPARISON: CT chest 2019    FINDINGS:     The aortic arch appears intact without narrowing of the origin of the great vessels.    Both common carotid arteries are patent up to the bifurcations. There is nonhemodynamically significant calcified atherosclerotic plaque at bilateral common carotid artery bifurcations.    The right internal carotid artery is patent up to the skull base, without hemodynamically significant stenosis or occlusion.    The left internal carotid artery is patent up to the skull base, without hemodynamically significant stenosis or occlusion.    The cervical vertebral arteries are patent throughout, without hemodynamically significant stenosis or occlusion.    There is a 6 mm solid nodule within the right upper lobe as well as a smaller nodule within the left upper lobe, similar to prior chest CT. The visualized portions of the mid esophagus are partially fluid-filled. There are multilevel degenerative changes of the cervical spine.    IMPRESSION: Unremarkable CTA examination of the neck.        Vital Signs Last 24 Hrs  T(C): 36.5 (09 Mar 2020 05:10), Max: 36.5 (08 Mar 2020 12:02)  T(F): 97.7 (09 Mar 2020 05:10), Max: 97.7 (08 Mar 2020 12:02)  HR: 64 (09 Mar 2020 05:10) (61 - 90)  BP: 144/80 (09 Mar 2020 05:10) (113/81 - 144/80)  BP(mean): --  RR: 15 (09 Mar 2020 05:10) (15 - 16)  SpO2: 95% (09 Mar 2020 05:10) (95% - 97%)    REVIEW OF SYSTEMS:    CONSTITUTIONAL: fatigue  EYES: No eye pain, visual disturbances, or discharge  ENMT:  No difficulty hearing, tinnitus, vertigo; No sinus or throat pain  NECK: No pain or stiffness  BREASTS: No pain, masses, or nipple discharge  RESPIRATORY: No cough, wheezing, chills or hemoptysis; No shortness of breath  CARDIOVASCULAR: No chest pain, palpitations, dizziness, or leg swelling  GASTROINTESTINAL: No abdominal or epigastric pain. No nausea, vomiting, or hematemesis; No diarrhea or constipation. No melena or hematochezia.  GENITOURINARY: No dysuria, frequency, hematuria, or incontinence  NEUROLOGICAL: No headaches, memory loss, loss of strength, numbness, or tremors  SKIN: No itching, burning, rashes, or lesions   LYMPH NODES: No enlarged glands  ENDOCRINE: No heat or cold intolerance; No hair loss  MUSCULOSKELETAL: No joint pain or swelling; No muscle, back, or extremity pain  PSYCHIATRIC: No depression, anxiety, mood swings, or difficulty sleeping  HEME/LYMPH: No easy bruising, or bleeding gums  ALLERGY AND IMMUNOLOGIC: No hives or eczema  VASCULAR: no swelling, erythema        Physical Exam: 78 yo  woman lying in semi Lyn's position, c/o fatigue    Head: normocephalic, atraumatic    Eyes: PERRLA, EOMI, no nystagmus, sclera anicteric    ENT: nasal discharge, uvula midline, no oropharyngeal erythema/exudate    Neck: supple, negative JVD, negative carotid bruits, no thyromegaly    Chest: CTA bilaterally, neg wheeze/ rhonchi/ rales/ crackles/ egophany    Cardiovascular: regular rate and rhythm, neg murmurs/rubs/gallops    Abdomen: soft, non distended, non tender to palpation in all 4 quadrants, negative rebound/guarding, normal bowel sounds    Extremities: WWP, neg cyanosis/clubbing/edema, negative calf tenderness to palpation, negative Brigid's sign      :     Neurologic Exam:    Alert and oriented to person, place, date/year, speech fluent w/o dysarthria, recent and remote memory intact, repetition intact, comprehension intact    Cranial Nerves:     II:                       pupils equal, round and reactive to light, visual fields intact   III/ IV/VI:            extraocular movements intact, neg nystagmus, neg ptosis  V:                       facial sensation intact, V1-3 normal  VII:                     face symmetric, no droop, normal eye closure and smile  VIII:                    hearing intact to finger rub bilaterally  IX/ X:                 soft palate rise symmetrical  XI:                      head turning, shoulder shrug normal  XII:                     tongue midline    Motor Exam:    Upper Extremities:     RIght:   5/5   /intrinsics               5/5  wrist flexors/extensors               5/5  biceps/triceps               5/5  deltoid               negative drift    Left :    5/5  /intrinsics               5/5  wrist flexors/extensors               5/5 biceps/triceps               5/5 deltoid               negative drift    Lower Extremities:                 Right:   5/5  DF/PF/ evertors/ invertors                5/5  Quad/ hamstrings                5/5  hip flexors/adductors/abductors                 Left:      5/5  DF/PF/ evertors/ invertors                5/5  Quad/ hamstrings                5/5  hip flexors/adductors/abductors                     Sensory:    intact to LT/PP in all UE/LE dermatomes                     DTR:             = biceps/     triceps/     brachioradialis                      = patella/   medial hamstring/ankle                      neg clonus                      neg Babinski                        Finger to Nose:  wnl    Heel to Shin:  wnl    Rapid Alternating movements:  wnl    Joint Position Sense:  intact    Romberg:  not tested    Tandem Walking:  not tested    Gait:  not tested        PM&R Impression:    1) deconditioned  2) no focal weakness    Plan:    1) Physical therapy focusing on therapeutic exercises, bed mobility/transfer out of bed evaluation, progressive ambulation with assistive devices prn.    2) Anticipated Disposition Plan/Recs:  d/c home , home physical therapy

## 2020-03-09 NOTE — DIETITIAN INITIAL EVALUATION ADULT. - PROBLEM SELECTOR PLAN 2
Found to be lying on the floor by daughter, no trauma, with LOC. 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   - CTH and CTA H/N unremarkable.  - Fully functional female at home, uses cane. Daughter is caretaker. No HHA.   - Physical Therapy  - Social Work

## 2020-03-09 NOTE — OCCUPATIONAL THERAPY INITIAL EVALUATION ADULT - ADDITIONAL COMMENTS
Patient reports use of tripod cane for functional mobility and daughter recently purchased shower chair for patient. Patient states independence in all functional mobility and ADLs w/ devices, however reports some assistance from daughter for tasks as needed.

## 2020-03-09 NOTE — PROGRESS NOTE ADULT - PROBLEM SELECTOR PLAN 10
1) PCP Contacted on Admission: (Y/N) --> Name & Phone #: Sukh Sapp [phone]  2) Date of Contact with PCP: Will call on 3/9   3) PCP Contacted at Discharge: (Y/N, N/A)  4) Summary of Handoff Given to PCP:   5) Post-Discharge Appointment Date and Location:

## 2020-03-09 NOTE — PROGRESS NOTE ADULT - ASSESSMENT
A 78 yo F with PMH of CVA x 3 (no residual deficits), HTN, HLD, asthma, COPD, scoliosis BIBEMS after found lying on floor in bedroom, with LOC, no trauma, admitted for syncope and fall. Differential includes seizure vs. vasovagal vs. TIA.

## 2020-03-09 NOTE — OCCUPATIONAL THERAPY INITIAL EVALUATION ADULT - PLANNED THERAPY INTERVENTIONS, OT EVAL
bed mobility training/motor coordination training/cognitive, visual perceptual/neuromuscular re-education/ROM/transfer training/ADL retraining/balance training/fine motor coordination training/strengthening

## 2020-03-09 NOTE — PROGRESS NOTE ADULT - PROBLEM SELECTOR PLAN 2
Patient found to be lethargic after being found down and found to have urinary incontinence, no tongue biting present. Hx of 3 CVA but no seizure history.   - 24 hour EEG  - Consider MRI head  - Contact outpatient neurologist Patient found to be lethargic after being found down and found to have urinary incontinence, no tongue biting present. Hx of 3 CVA but no seizure history. Patient had one episode of urinary incontinence o/n.   - Re-apply 24 hour EEG  - Consider MRI head  - Contact outpatient neurologist Patient found to be lethargic after being found down and found to have urinary incontinence, no tongue biting present. Hx of 3 CVA but no seizure history. Patient had one episode of urinary incontinence o/n.   - Re-apply 24 hour EEG  - Consider MRI head  - Contact outpatient neurologist: No hx of seizure, last seen one year ago.

## 2020-03-09 NOTE — DIETITIAN INITIAL EVALUATION ADULT. - PROBLEM SELECTOR PLAN 1
Patient with syncopal episode, with no trauma, but observed LOC. Does not recall falling. Review of medication list does not warrant a drug induced association. No prodromal symptoms recalled. No history of arrythmia.   - Non-neurological unlikely as CTH and CTA H/N unremarkable.   - EKG NSR however, will obtain ECHO to r/o other cardiac etiologies, cardiac enzymes negative  - f/u Orthostatic BP  - f/u TSH, Lipid Profile  - f/u AM EKG  - Cardiology consulted, f/u recommendations

## 2020-03-09 NOTE — OCCUPATIONAL THERAPY INITIAL EVALUATION ADULT - MD ORDER
Per chart, 80 yo F with PMH of CVA x 3 (no residual deficits), HTN, HLD, asthma, COPD, scoliosis BIBEMS after found lying on floor in bedroom. Patient does not remember how she ended up on the floor or what she was doing prior to being on the floor.

## 2020-03-09 NOTE — OCCUPATIONAL THERAPY INITIAL EVALUATION ADULT - SHORT TERM MEMORY, REHAB EVAL
impaired/Patient able to recall 3/3 words w/ immediate recall and 2/3 words w/ 3 minute delayed recall required 2 category cues to ID other word.

## 2020-03-09 NOTE — DIETITIAN INITIAL EVALUATION ADULT. - PROBLEM SELECTOR PLAN 6
History of hypothyroidism, on Synthroid 88mcg daily.   - f/u TSH  - Continue with medications.    #HLD  History of HLD on Lipitor 20mg daily.  - f/u Lipid Profile  - Continue medication.

## 2020-03-09 NOTE — DIETITIAN INITIAL EVALUATION ADULT. - MALNUTRITION
Suspect Severe - Per physical assessment (limited given pt ability to patriciate): Muscle Wasting- Temporal [ Mild ]  Clavicle/Pectoral [ Mild/moderate ]  Shoulder/Deltoid [ Mild ]  Scapula [ Mild/moderate ]  Interosseous [ Moderaete ]  Quadriceps [   ]  Gastrocnemius [   ]; Fat Wasting- Orbital [   ]  Buccal [ Moderate ]  Triceps [   ]  Rib [   ]. Inadequate PO intake: PTA reported eating 2-3meals/day, likely not often consuming 3 meals; 1+ unremarkable edema suspected severe

## 2020-03-09 NOTE — PROGRESS NOTE ADULT - PROBLEM SELECTOR PLAN 4
Start Nystatin Swish and Swallow 500,000units q8h; swish in the mouth and retain for as long as possible (several minutes) before swallowing Start Nystatin Swish and Swallow 500,000units q8h; swish in the mouth and retain for as long as possible (several minutes) before swallowing. Likely in the setting of inhaled steroid. Likely in the setting of inhaled steroid.  - Nystatin Swish and Swallow 500,000units q8h

## 2020-03-09 NOTE — PROGRESS NOTE ADULT - PROBLEM SELECTOR PLAN 1
Patient with syncopal episode, with no trauma, but observed LOC. Does not recall falling. Review of medication list does not warrant a drug induced association. No prodromal symptoms recalled. No history of arrythmia, none present on admission. CE negative. Ddx includes seizure vs.  vasovagal vs TIA  - CTH/CTA negative, consider MRI head  - Daily EKG  - Cardiology consulted, f/u recommendations  - Obtain official ECHO   - Obtain collateral from outpatient cardiologist       #Fall   Found to be lying on the floor by daughter, no trauma, with LOC. 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   - CTH and CTA H/N unremarkable.  - Fully functional female at home, uses cane. Daughter is caretaker. No HHA.   - Physical Therapy  - Social Work Patient with syncopal episode, with no trauma, but observed LOC. Does not recall falling. Review of medication list does not warrant a drug induced association. No prodromal symptoms recalled. No history of arrythmia, none present on admission. CE negative. Ddx includes seizure vs.  vasovagal vs TIA  - CTH/CTA negative, consider MRI head  - Daily EKG  - Cardiology consulted, f/u recommendations  - Obtain official ECHO   - Obtain collateral from outpatient cardiologist     #Fall   Found to be lying on the floor by daughter, no trauma, with LOC. 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   - CTH and CTA H/N unremarkable.  - Fully functional female at home, uses cane. Daughter is caretaker. No HHA.   - Physical Therapy  - Social Work Patient with syncopal episode, with no trauma, but observed LOC. Does not recall falling. Review of medication list does not warrant a drug induced association. No prodromal symptoms recalled. No history of arrythmia, none present on admission. CE negative. Ddx includes seizure vs.  vasovagal vs TIA  - CTH/CTA negative, consider MRI head  - Daily EKG  - Cardiology consulted, f/u recommendations  - Obtain official ECHO   - Obtain collateral from outpatient cardiologist: No hx of arrythmia, recent ECHO showed preserved EF, no hx of syncope    #Fall   Found to be lying on the floor by daughter, no trauma, with LOC. 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   - CTH and CTA H/N unremarkable.  - Fully functional female at home, uses cane. Daughter is caretaker. No HHA.   - Physical Therapy  - Social Work

## 2020-03-09 NOTE — DIETITIAN INITIAL EVALUATION ADULT. - ADD RECOMMEND
1. Monitor PO intake/appetite, GI distress, diet tolerance, labs, weights.  2. Honor pt food preferences as able.  3. MVI daily. 4. RD to remain available for additional nutrition interventions as needed.

## 2020-03-09 NOTE — CHART NOTE - NSCHARTNOTEFT_GEN_A_CORE
Upon Nutritional Assessment by the Registered Dietitian your patient was determined to meet criteria / has evidence of the following diagnosis/diagnoses:          [ ]  Mild Protein Calorie Malnutrition        [ ]  Moderate Protein Calorie Malnutrition        [x] Severe Protein Calorie Malnutrition        [ ] Unspecified Protein Calorie Malnutrition        [ ] Underweight / BMI <19        [ ] Morbid Obesity / BMI > 40      Findings as based on:  •  Comprehensive nutrition assessment and consultation: Per physical assessment (limited given pt ability to patriciate): Muscle Wasting- Temporal [ Mild ]  Clavicle/Pectoral [ Mild/moderate ]  Shoulder/Deltoid [ Mild ]  Scapula [ Mild/moderate ]  Interosseous [ Moderaete ]  Quadriceps [   ]  Gastrocnemius [   ]; Fat Wasting- Orbital [   ]  Buccal [ Moderate ]  Triceps [   ]  Rib [   ]. Inadeqaute PO intake: PTA reported eating 2-3meals/day, likely not often consuming 3 meals; 1+ unremarkable edema  Treatment:  MVI Daily   The following diet has been recommended: Suggest to liberalize diet to regular vs low sodium + use of oral nutrition supplements, suggest ensure enlive x2 per day (350kcal/20gm prot per 1 can)      PROVIDER Section:     By signing this assessment you are acknowledging and agree with the diagnosis/diagnoses assigned by the Registered Dietitian    Comments:

## 2020-03-09 NOTE — DISCHARGE NOTE NURSING/CASE MANAGEMENT/SOCIAL WORK - PATIENT PORTAL LINK FT
You can access the FollowMyHealth Patient Portal offered by [facility] by registering at the following website: [email or web address]. By joining [employer]’s FollowMyHealth portal, you will also be able to view your health information using other applications (apps) compatible with our system.

## 2020-03-09 NOTE — DIETITIAN INITIAL EVALUATION ADULT. - ENERGY NEEDS
Ht per pt: 4'8'' IBW 92 pounds +/-10%   Wt 79 pounds (3/8); %IBW=86% BMI=17.71  IBW used for EER as %IBW less then %  Adjusted for age, suspected malnutrition

## 2020-03-09 NOTE — PROGRESS NOTE ADULT - PROBLEM SELECTOR PLAN 3
Reports loss of weight ; given thrush will check A1c; check also TSH; further workup as outpt if no urgent cause found Reports loss of weight; further workup as outpt if no urgent cause found  - A1C 4.8  - TSH WNL

## 2020-03-09 NOTE — DIETITIAN INITIAL EVALUATION ADULT. - OTHER INFO
80yo F, PMH of CVA x 3 (no residual deficits), HTN, HLD, asthma, Primary biliary cirrhosis, COPD, scoliosis BIBEMS after found lying on floor in bedroom. CTH unremarkable for hemorrhage. CTA H/N unremarkable. Patient admitted for management of mechanical fall / syncope /r/o Szs. THAO with ECHO pending, r/o cardaic cause. No pain per flow sheets. 1+ unremarkable edema. No pressure ulcers.   Current diet order for DASH TLC diet 3/8.   Please see below for nutritions recommendations. 78yo F, PMH of CVA x 3 (no residual deficits), HTN, HLD, asthma, Primary biliary cirrhosis, COPD, scoliosis BIBEMS after found lying on floor in bedroom. CTH unremarkable for hemorrhage. CTA H/N unremarkable. Patient admitted for management of mechanical fall / syncope /r/o Szs. THAO with ECHO pending, r/o cardaic cause. No pain per flow sheets. 1+ unremarkable edema. No pressure ulcers.   Spoke with pt, Had hard time recalling Details. PTA reported eating 2-3meals/day, likely not often consuming 3 meals. Likes egg/toast, pot pie. Has taken oral nutrition supplements in the past now often. Pt Current diet order for [employer] diet 3/8. Limited intake remains however unable to determine exact %PO intake. No issues chewing/swallowing at this time. Per EMR allergic to Pitted fruit, Kiwi and nuts - pt reports allergic to tree nuts/fruits; added to EMR. Pt feels she has lost wt, UBW 75 pounds - had been wt for awhile, reports being told when admitted weighing 65 pounds however per EMR 3/8 noted wt of 79 pounds; Noted currently with 1+ unremarkable edema which may be masking further wt loss. Per physical assessment (limited given pt ability to patriciate): Muscle Wasting- Temporal [ Mild ]  Clavicle/Pectoral [ Mild/moderate ]  Shoulder/Deltoid [ Mild ]  Scapula [ Mild/moderate ]  Interosseous [ Moderaete ]  Quadriceps [   ]  Gastrocnemius [   ]; Fat Wasting- Orbital [   ]  Buccal [ Moderate ]  Triceps [   ]  Rib [   ].   Education: Discussed oral nutrition supplements. Encouraged PO intake during meals & reviewed importance. Discussed calorically dense foods. Understanding stated, provide additional motivation as needed.    Please see below for nutritions recommendations. Recs made with team. 80yo F, PMH of CVA x 3 (no residual deficits), HTN, HLD, asthma, Primary biliary cirrhosis, COPD, scoliosis BIBEMS after found lying on floor in bedroom. CTH unremarkable for hemorrhage. CTA H/N unremarkable. Patient admitted for management of mechanical fall / syncope /r/o Szs. THAO with ECHO pending, r/o cardaic cause. No pain per flow sheets. 1+ unremarkable edema. No pressure ulcers.   Spoke with pt, Had hard time recalling Details. PTA reported eating 2-3meals/day, likely not often consuming 3 meals. Likes egg/toast, pot pie. Has taken oral nutrition supplements in the past now often. Pt Current diet order for [facility] diet 3/8. Limited intake remains however unable to determine exact %PO intake. No issues chewing/swallowing at this time. Per EMR allergic to Pitted fruit, Kiwi and nuts - pt reports allergic to tree nuts/fruits; added to EMR. Pt feels she has lost wt, UBW 75 pounds - had been wt for awhile, reports being told when admitted weighing 65 pounds however per EMR 3/8 noted wt of 79 pounds; Noted currently with 1+ unremarkable edema which may be masking further wt loss. Per physical assessment (limited given pt ability to patriciate): Muscle Wasting- Temporal [ Mild ]  Clavicle/Pectoral [ Mild/moderate ]  Shoulder/Deltoid [ Mild ]  Scapula [ Mild/moderate ]  Interosseous [ Moderaete ]  Quadriceps [   ]  Gastrocnemius [   ]; Fat Wasting- Orbital [   ]  Buccal [ Moderate ]  Triceps [   ]  Rib [   ].   Education: Discussed oral nutrition supplements. Encouraged PO intake during meals & reviewed importance. Discussed calorically dense foods. Understanding stated, provide additional motivation as needed.    Please see below for nutritions recommendations. Paged Team several times for Recs.

## 2020-03-09 NOTE — DIETITIAN INITIAL EVALUATION ADULT. - PROBLEM SELECTOR PLAN 3
ADDENDUM: reports loss of weight ; given thrush will check A1c; check also TSH; further workup as outpt if no urgent cause found

## 2020-03-09 NOTE — PROGRESS NOTE ADULT - SUBJECTIVE AND OBJECTIVE BOX
CIELO SERRANO  79y  Female    Patient is a 79y old  Female who presents with a chief complaint of Fall (08 Mar 2020 14:44)      INTERVAL HPI/OVERNIGHT EVENTS:    REVIEW OF SYSTEMS:  CONSTITUTIONAL: No fever, weight loss, or fatigue  EYES: No eye pain, visual disturbances, or discharge  ENMT:  No difficulty hearing, tinnitus, vertigo; No sinus or throat pain  NECK: No pain or stiffness  BREASTS: No pain, masses, or nipple discharge  RESPIRATORY: No cough, wheezing, chills or hemoptysis; No shortness of breath  CARDIOVASCULAR: No chest pain, palpitations, dizziness, or leg swelling  GASTROINTESTINAL: No abdominal or epigastric pain. No nausea, vomiting, or hematemesis; No diarrhea or constipation. No melena or hematochezia.  GENITOURINARY: No dysuria, frequency, hematuria, or incontinence  NEUROLOGICAL: No headaches, memory loss, loss of strength, numbness, or tremors  SKIN: No itching, burning, rashes, or lesions   LYMPH NODES: No enlarged glands  ENDOCRINE: No heat or cold intolerance; No hair loss  MUSCULOSKELETAL: No joint pain or swelling; No muscle, back, or extremity pain  PSYCHIATRIC: No depression, anxiety, mood swings, or difficulty sleeping  HEME/LYMPH: No easy bruising, or bleeding gums  ALLERY AND IMMUNOLOGIC: No hives or eczema    T(C): 36.5 (20 @ 05:10), Max: 36.5 (20 @ 12:02)  HR: 64 (20 @ 05:10) (61 - 90)  BP: 144/80 (20 @ 05:10) (113/81 - 144/80)  RR: 15 (20 @ 05:10) (15 - 16)  SpO2: 95% (20 @ 05:10) (95% - 97%)  Wt(kg): --Vital Signs Last 24 Hrs  T(C): 36.5 (09 Mar 2020 05:10), Max: 36.5 (08 Mar 2020 12:02)  T(F): 97.7 (09 Mar 2020 05:10), Max: 97.7 (08 Mar 2020 12:02)  HR: 64 (09 Mar 2020 05:10) (61 - 90)  BP: 144/80 (09 Mar 2020 05:10) (113/81 - 144/80)  BP(mean): --  RR: 15 (09 Mar 2020 05:10) (15 - 16)  SpO2: 95% (09 Mar 2020 05:10) (95% - 97%)    PHYSICAL EXAM:  GENERAL: NAD, well-groomed, well-developed  HEAD:  Atraumatic, Normocephalic  EYES: EOMI, PERRLA, conjunctiva and sclera clear  ENMT: No tonsillar erythema, exudates, or enlargement; Moist mucous membranes, Good dentition, No lesions  NECK: Supple, No JVD, Normal thyroid  NERVOUS SYSTEM:  Alert & Oriented X3, Good concentration; Motor Strength 5/5 B/L upper and lower extremities; DTRs 2+ intact and symmetric  CHEST/LUNG: Clear to auscultation bilaterally; No rales, rhonchi, wheezing, or rubs  HEART: Regular rate and rhythm; No murmurs, rubs, or gallops  ABDOMEN: Soft, Nontender, Nondistended; Bowel sounds present  EXTREMITIES:  WWP, No clubbing, cyanosis, or edema  Vascular: 2+ peripheral pulses x4  LYMPH: No lymphadenopathy noted  SKIN: No rashes or lesions    Consultant(s) Notes Reviewed:  [x ] YES  [ ] NO  Care Discussed with Consultants/Other Providers [ x] YES  [ ] NO    LABS:                        11.6   6.57  )-----------( 248      ( 08 Mar 2020 06:11 )             36.3     03-09    138  |  104  |  22  ----------------------------<  96  4.2   |  27  |  0.83    Ca    8.9      09 Mar 2020 05:50  Mg     2.2     03-09    TPro  6.1  /  Alb  3.3  /  TBili  0.6  /  DBili  x   /  AST  19  /  ALT  6<L>  /  AlkPhos  54  03-09      PT/INR - ( 07 Mar 2020 20:17 )   PT: 12.3 sec;   INR: 1.08          PTT - ( 07 Mar 2020 20:17 )  PTT:29.0 sec  Urinalysis Basic - ( 07 Mar 2020 22:32 )    Color: Yellow / Appearance: Clear / S.015 / pH: x  Gluc: x / Ketone: NEGATIVE  / Bili: Negative / Urobili: 0.2 E.U./dL   Blood: x / Protein: NEGATIVE mg/dL / Nitrite: NEGATIVE   Leuk Esterase: NEGATIVE / RBC: < 5 /HPF / WBC < 5 /HPF   Sq Epi: x / Non Sq Epi: 0-5 /HPF / Bacteria: Present /HPF      CAPILLARY BLOOD GLUCOSE            Urinalysis Basic - ( 07 Mar 2020 22:32 )    Color: Yellow / Appearance: Clear / S.015 / pH: x  Gluc: x / Ketone: NEGATIVE  / Bili: Negative / Urobili: 0.2 E.U./dL   Blood: x / Protein: NEGATIVE mg/dL / Nitrite: NEGATIVE   Leuk Esterase: NEGATIVE / RBC: < 5 /HPF / WBC < 5 /HPF   Sq Epi: x / Non Sq Epi: 0-5 /HPF / Bacteria: Present /HPF        RADIOLOGY & ADDITIONAL TESTS:    Imaging Personally Reviewed:  [ ] YES  [ ] NO    HEALTH ISSUES - PROBLEM Dx:  Loss of weight: Loss of weight  Syncope, unspecified syncope type: Syncope, unspecified syncope type  Oral thrush: Oral thrush  Asthma: Asthma  Primary biliary cirrhosis: Primary biliary cirrhosis  Transition of care performed with sharing of clinical summary: Transition of care performed with sharing of clinical summary  Prophylactic measure: Prophylactic measure  Nutrition, metabolism, and development symptoms: Nutrition, metabolism, and development symptoms  HTN (hypertension): HTN (hypertension)  Hyperlipidemia: Hyperlipidemia  Hypothyroid: Hypothyroid  Fall, initial encounter: Fall, initial encounter CIELO SERRANO  79y  Female    Patient is a 79y old  Female who presents with a chief complaint of Fall (08 Mar 2020 14:44)      INTERVAL HPI/OVERNIGHT EVENTS: Patient pulled off her EEG o/n, she also had one episode of urinary incontinence. Patient has no other complaints.        T(C): 36.5 (20 @ 05:10), Max: 36.5 (20 @ 12:02)  HR: 64 (20 @ 05:10) (61 - 90)  BP: 144/80 (20 @ 05:10) (113/81 - 144/80)  RR: 15 (20 @ 05:10) (15 - 16)  SpO2: 95% (20 @ 05:10) (95% - 97%)  Wt(kg): --Vital Signs Last 24 Hrs  T(C): 36.5 (09 Mar 2020 05:10), Max: 36.5 (08 Mar 2020 12:02)  T(F): 97.7 (09 Mar 2020 05:10), Max: 97.7 (08 Mar 2020 12:02)  HR: 64 (09 Mar 2020 05:10) (61 - 90)  BP: 144/80 (09 Mar 2020 05:10) (113/81 - 144/80)  BP(mean): --  RR: 15 (09 Mar 2020 05:10) (15 - 16)  SpO2: 95% (09 Mar 2020 05:10) (95% - 97%)    PHYSICAL EXAM:  GENERAL: NAD  HEAD: Atraumatic, Normocephalic  EYES: Conjunctiva and sclera clear  ENMT: Moist mucous membranes  NECK: Supple, No JVD  NERVOUS SYSTEM:  Alert & Oriented X3. CN II-XII intact with no focal deficits   CHEST/LUNG: Clear to auscultation bilaterally; No rales, rhonchi, wheezing, or rubs  HEART: Regular rate and rhythm; No murmurs, rubs, or gallops  ABDOMEN: Soft, Nontender, Nondistended; Bowel sounds present  EXTREMITIES: WWP, No clubbing, cyanosis, or edema  Vascular: 2+ peripheral pulses x4    Consultant(s) Notes Reviewed:  [x ] YES  [ ] NO  Care Discussed with Consultants/Other Providers [ x] YES  [ ] NO    LABS:                        11.6   6.57  )-----------( 248      ( 08 Mar 2020 06:11 )             36.3     03-09    138  |  104  |  22  ----------------------------<  96  4.2   |  27  |  0.83    Ca    8.9      09 Mar 2020 05:50  Mg     2.2     -09    TPro  6.1  /  Alb  3.3  /  TBili  0.6  /  DBili  x   /  AST  19  /  ALT  6<L>  /  AlkPhos  54  03-09      PT/INR - ( 07 Mar 2020 20:17 )   PT: 12.3 sec;   INR: 1.08          PTT - ( 07 Mar 2020 20:17 )  PTT:29.0 sec  Urinalysis Basic - ( 07 Mar 2020 22:32 )    Color: Yellow / Appearance: Clear / S.015 / pH: x  Gluc: x / Ketone: NEGATIVE  / Bili: Negative / Urobili: 0.2 E.U./dL   Blood: x / Protein: NEGATIVE mg/dL / Nitrite: NEGATIVE   Leuk Esterase: NEGATIVE / RBC: < 5 /HPF / WBC < 5 /HPF   Sq Epi: x / Non Sq Epi: 0-5 /HPF / Bacteria: Present /HPF      CAPILLARY BLOOD GLUCOSE            Urinalysis Basic - ( 07 Mar 2020 22:32 )    Color: Yellow / Appearance: Clear / S.015 / pH: x  Gluc: x / Ketone: NEGATIVE  / Bili: Negative / Urobili: 0.2 E.U./dL   Blood: x / Protein: NEGATIVE mg/dL / Nitrite: NEGATIVE   Leuk Esterase: NEGATIVE / RBC: < 5 /HPF / WBC < 5 /HPF   Sq Epi: x / Non Sq Epi: 0-5 /HPF / Bacteria: Present /HPF        RADIOLOGY & ADDITIONAL TESTS:    Imaging Personally Reviewed:  [ ] YES  [ ] NO    HEALTH ISSUES - PROBLEM Dx:  Loss of weight: Loss of weight  Syncope, unspecified syncope type: Syncope, unspecified syncope type  Oral thrush: Oral thrush  Asthma: Asthma  Primary biliary cirrhosis: Primary biliary cirrhosis  Transition of care performed with sharing of clinical summary: Transition of care performed with sharing of clinical summary  Prophylactic measure: Prophylactic measure  Nutrition, metabolism, and development symptoms: Nutrition, metabolism, and development symptoms  HTN (hypertension): HTN (hypertension)  Hyperlipidemia: Hyperlipidemia  Hypothyroid: Hypothyroid  Fall, initial encounter: Fall, initial encounter

## 2020-03-09 NOTE — CONSULT NOTE ADULT - ASSESSMENT
per Internal Medicine    A 80 yo F with PMH of CVA x 3 (no residual deficits), HTN, HLD, asthma, COPD, scoliosis BIBEMS after found lying on floor in bedroom, with LOC, no trauma, admitted for syncope and fall. Differential includes seizure vs. vasovagal vs. TIA.      Problem/Plan - 1:  ·  Problem: Syncope, unspecified syncope type.  Plan: Patient with syncopal episode, with no trauma, but observed LOC. Does not recall falling. Review of medication list does not warrant a drug induced association. No prodromal symptoms recalled. No history of arrythmia, none present on admission. CE negative. Ddx includes seizure vs.  vasovagal vs TIA  - CTH/CTA negative, consider MRI head  - Daily EKG  - Cardiology consulted, f/u recommendations  - Obtain official ECHO   - Obtain collateral from outpatient cardiologist       #Fall   Found to be lying on the floor by daughter, no trauma, with LOC. 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   - CTH and CTA H/N unremarkable.  - Fully functional female at home, uses cane. Daughter is caretaker. No HHA.   - Physical Therapy  - Social Work.     Problem/Plan - 2:  ·  Problem: R/O Seizure.  Plan: Patient found to be lethargic after being found down and found to have urinary incontinence, no tongue biting present. Hx of 3 CVA but no seizure history.   - 24 hour EEG  - Consider MRI head  - Contact outpatient neurologist.     Problem/Plan - 3:  ·  Problem: Loss of weight.  Plan: Reports loss of weight ; given thrush will check A1c; check also TSH; further workup as outpt if no urgent cause found.     Problem/Plan - 4:  ·  Problem: Oral thrush.  Plan: Start Nystatin Swish and Swallow 500,000units q8h; swish in the mouth and retain for as long as possible (several minutes) before swallowing.     Problem/Plan - 5:  ·  Problem: HTN (hypertension).  Plan: History of HTN on Ramipril 10mg daily. => Lisinopril 40mg daily conversion  - Currently hypertensive.   - Continue medication.     Problem/Plan - 6:  Problem: Hypothyroid. Plan: History of hypothyroidism, on Synthroid 88mcg daily. TSH normal   - Continue with medications    #HLD  History of HLD on Lipitor 20mg daily. Lipid profile WNL  - Continue medication.    Problem/Plan - 7:  ·  Problem: Asthma.  Plan: Continue with Albuterol and Ventolin inhaler. Continue with Singulair 10mg daily.    #Primary Billary Cirrhosis  - Continue with Ursodiol 1000mg daily.     Problem/Plan - 8:  ·  Problem: Nutrition, metabolism, and development symptoms.  Plan: F: None  E: Replete electrolytes PRN  N: DASH/TLC.     Problem/Plan - 9:  ·  Problem: Prophylactic measure.  Plan: DVT PPX: Lovenox 40mg daily  ETHICS: Full Code  DISPOSITION: [facility].

## 2020-03-09 NOTE — DIETITIAN INITIAL EVALUATION ADULT. - DIET TYPE
Suggest to liberalize diet to regular vs low sodium + use of oral nutrition supplements, suggest ensure enlive x2 per day (350kcal/20gm prot per 1 can).

## 2020-03-10 VITALS — HEART RATE: 68 BPM | SYSTOLIC BLOOD PRESSURE: 137 MMHG | DIASTOLIC BLOOD PRESSURE: 82 MMHG

## 2020-03-10 DIAGNOSIS — F03.90 UNSPECIFIED DEMENTIA WITHOUT BEHAVIORAL DISTURBANCE: ICD-10-CM

## 2020-03-10 LAB
ANION GAP SERPL CALC-SCNC: 9 MMOL/L — SIGNIFICANT CHANGE UP (ref 5–17)
BASOPHILS # BLD AUTO: 0.02 K/UL — SIGNIFICANT CHANGE UP (ref 0–0.2)
BASOPHILS NFR BLD AUTO: 0.3 % — SIGNIFICANT CHANGE UP (ref 0–2)
BUN SERPL-MCNC: 21 MG/DL — SIGNIFICANT CHANGE UP (ref 7–23)
CALCIUM SERPL-MCNC: 9.2 MG/DL — SIGNIFICANT CHANGE UP (ref 8.4–10.5)
CHLORIDE SERPL-SCNC: 106 MMOL/L — SIGNIFICANT CHANGE UP (ref 96–108)
CO2 SERPL-SCNC: 26 MMOL/L — SIGNIFICANT CHANGE UP (ref 22–31)
CREAT SERPL-MCNC: 0.77 MG/DL — SIGNIFICANT CHANGE UP (ref 0.5–1.3)
EOSINOPHIL # BLD AUTO: 0.34 K/UL — SIGNIFICANT CHANGE UP (ref 0–0.5)
EOSINOPHIL NFR BLD AUTO: 5.6 % — SIGNIFICANT CHANGE UP (ref 0–6)
GLUCOSE SERPL-MCNC: 94 MG/DL — SIGNIFICANT CHANGE UP (ref 70–99)
HCT VFR BLD CALC: 35.3 % — SIGNIFICANT CHANGE UP (ref 34.5–45)
HGB BLD-MCNC: 11.1 G/DL — LOW (ref 11.5–15.5)
IMM GRANULOCYTES NFR BLD AUTO: 0.3 % — SIGNIFICANT CHANGE UP (ref 0–1.5)
LYMPHOCYTES # BLD AUTO: 0.67 K/UL — LOW (ref 1–3.3)
LYMPHOCYTES # BLD AUTO: 11.1 % — LOW (ref 13–44)
MAGNESIUM SERPL-MCNC: 2.3 MG/DL — SIGNIFICANT CHANGE UP (ref 1.6–2.6)
MCHC RBC-ENTMCNC: 27.1 PG — SIGNIFICANT CHANGE UP (ref 27–34)
MCHC RBC-ENTMCNC: 31.4 GM/DL — LOW (ref 32–36)
MCV RBC AUTO: 86.3 FL — SIGNIFICANT CHANGE UP (ref 80–100)
MONOCYTES # BLD AUTO: 0.66 K/UL — SIGNIFICANT CHANGE UP (ref 0–0.9)
MONOCYTES NFR BLD AUTO: 10.9 % — SIGNIFICANT CHANGE UP (ref 2–14)
NEUTROPHILS # BLD AUTO: 4.33 K/UL — SIGNIFICANT CHANGE UP (ref 1.8–7.4)
NEUTROPHILS NFR BLD AUTO: 71.8 % — SIGNIFICANT CHANGE UP (ref 43–77)
NRBC # BLD: 0 /100 WBCS — SIGNIFICANT CHANGE UP (ref 0–0)
PLATELET # BLD AUTO: 219 K/UL — SIGNIFICANT CHANGE UP (ref 150–400)
POTASSIUM SERPL-MCNC: 4.8 MMOL/L — SIGNIFICANT CHANGE UP (ref 3.5–5.3)
POTASSIUM SERPL-SCNC: 4.8 MMOL/L — SIGNIFICANT CHANGE UP (ref 3.5–5.3)
RBC # BLD: 4.09 M/UL — SIGNIFICANT CHANGE UP (ref 3.8–5.2)
RBC # FLD: 15.2 % — HIGH (ref 10.3–14.5)
SODIUM SERPL-SCNC: 141 MMOL/L — SIGNIFICANT CHANGE UP (ref 135–145)
WBC # BLD: 6.04 K/UL — SIGNIFICANT CHANGE UP (ref 3.8–10.5)
WBC # FLD AUTO: 6.04 K/UL — SIGNIFICANT CHANGE UP (ref 3.8–10.5)

## 2020-03-10 PROCEDURE — 85610 PROTHROMBIN TIME: CPT

## 2020-03-10 PROCEDURE — 85027 COMPLETE CBC AUTOMATED: CPT

## 2020-03-10 PROCEDURE — 93005 ELECTROCARDIOGRAM TRACING: CPT

## 2020-03-10 PROCEDURE — 70498 CT ANGIOGRAPHY NECK: CPT

## 2020-03-10 PROCEDURE — 84443 ASSAY THYROID STIM HORMONE: CPT

## 2020-03-10 PROCEDURE — 97116 GAIT TRAINING THERAPY: CPT

## 2020-03-10 PROCEDURE — 80061 LIPID PANEL: CPT

## 2020-03-10 PROCEDURE — 95720 EEG PHY/QHP EA INCR W/VEEG: CPT

## 2020-03-10 PROCEDURE — 95716 VEEG EA 12-26HR CONT MNTR: CPT

## 2020-03-10 PROCEDURE — 85025 COMPLETE CBC W/AUTO DIFF WBC: CPT

## 2020-03-10 PROCEDURE — 71046 X-RAY EXAM CHEST 2 VIEWS: CPT

## 2020-03-10 PROCEDURE — 97110 THERAPEUTIC EXERCISES: CPT

## 2020-03-10 PROCEDURE — 70496 CT ANGIOGRAPHY HEAD: CPT

## 2020-03-10 PROCEDURE — 80053 COMPREHEN METABOLIC PANEL: CPT

## 2020-03-10 PROCEDURE — 97162 PT EVAL MOD COMPLEX 30 MIN: CPT

## 2020-03-10 PROCEDURE — 86901 BLOOD TYPING SEROLOGIC RH(D): CPT

## 2020-03-10 PROCEDURE — 82962 GLUCOSE BLOOD TEST: CPT

## 2020-03-10 PROCEDURE — 83036 HEMOGLOBIN GLYCOSYLATED A1C: CPT

## 2020-03-10 PROCEDURE — 99239 HOSP IP/OBS DSCHRG MGMT >30: CPT

## 2020-03-10 PROCEDURE — 93010 ELECTROCARDIOGRAM REPORT: CPT

## 2020-03-10 PROCEDURE — 70450 CT HEAD/BRAIN W/O DYE: CPT

## 2020-03-10 PROCEDURE — 84484 ASSAY OF TROPONIN QUANT: CPT

## 2020-03-10 PROCEDURE — 36415 COLL VENOUS BLD VENIPUNCTURE: CPT

## 2020-03-10 PROCEDURE — 99285 EMERGENCY DEPT VISIT HI MDM: CPT

## 2020-03-10 PROCEDURE — 86850 RBC ANTIBODY SCREEN: CPT

## 2020-03-10 PROCEDURE — 83735 ASSAY OF MAGNESIUM: CPT

## 2020-03-10 PROCEDURE — 93306 TTE W/DOPPLER COMPLETE: CPT

## 2020-03-10 PROCEDURE — 81001 URINALYSIS AUTO W/SCOPE: CPT

## 2020-03-10 PROCEDURE — 85730 THROMBOPLASTIN TIME PARTIAL: CPT

## 2020-03-10 PROCEDURE — 82550 ASSAY OF CK (CPK): CPT

## 2020-03-10 PROCEDURE — 87086 URINE CULTURE/COLONY COUNT: CPT

## 2020-03-10 PROCEDURE — 80048 BASIC METABOLIC PNL TOTAL CA: CPT

## 2020-03-10 PROCEDURE — 95700 EEG CONT REC W/VID EEG TECH: CPT

## 2020-03-10 RX ADMIN — LISINOPRIL 40 MILLIGRAM(S): 2.5 TABLET ORAL at 06:53

## 2020-03-10 RX ADMIN — Medication 500000 UNIT(S): at 06:53

## 2020-03-10 RX ADMIN — MONTELUKAST 10 MILLIGRAM(S): 4 TABLET, CHEWABLE ORAL at 12:25

## 2020-03-10 RX ADMIN — Medication 500000 UNIT(S): at 12:25

## 2020-03-10 RX ADMIN — ENOXAPARIN SODIUM 40 MILLIGRAM(S): 100 INJECTION SUBCUTANEOUS at 06:53

## 2020-03-10 RX ADMIN — Medication 88 MICROGRAM(S): at 06:53

## 2020-03-10 RX ADMIN — PANTOPRAZOLE SODIUM 40 MILLIGRAM(S): 20 TABLET, DELAYED RELEASE ORAL at 06:53

## 2020-03-10 RX ADMIN — URSODIOL 1000 MILLIGRAM(S): 250 TABLET, FILM COATED ORAL at 12:25

## 2020-03-10 RX ADMIN — Medication 500000 UNIT(S): at 17:27

## 2020-03-10 NOTE — PROGRESS NOTE ADULT - SUBJECTIVE AND OBJECTIVE BOX
CIELO SERRANO  79y  Female    Patient is a 79y old  Female who presents with a chief complaint of Fall (09 Mar 2020 09:32)      INTERVAL HPI/OVERNIGHT EVENTS: MELONIE o/n. Still very forgetful.       T(C): 36.7 (03-10-20 @ 05:29), Max: 36.9 (03-09-20 @ 20:37)  HR: 56 (03-10-20 @ 05:29) (56 - 71)  BP: 151/78 (03-10-20 @ 05:29) (123/79 - 151/78)  RR: 16 (03-10-20 @ 05:29) (16 - 16)  SpO2: 96% (03-10-20 @ 05:29) (96% - 96%)  Wt(kg): --Vital Signs Last 24 Hrs  T(C): 36.7 (10 Mar 2020 05:29), Max: 36.9 (09 Mar 2020 20:37)  T(F): 98 (10 Mar 2020 05:29), Max: 98.5 (09 Mar 2020 20:37)  HR: 56 (10 Mar 2020 05:29) (56 - 71)  BP: 151/78 (10 Mar 2020 05:29) (123/79 - 151/78)  BP(mean): --  RR: 16 (10 Mar 2020 05:29) (16 - 16)  SpO2: 96% (10 Mar 2020 05:29) (96% - 96%)    PHYSICAL EXAM:  GENERAL: NAD  HEAD: Atraumatic, Normocephalic  EYES: Conjunctiva and sclera clear  ENMT: Moist mucous membranes  NECK: Supple, No JVD  NERVOUS SYSTEM:  Alert & Oriented X3. CN II-XII intact with no focal deficits   CHEST/LUNG: Clear to auscultation bilaterally; No rales, rhonchi, wheezing, or rubs  HEART: Regular rate and rhythm; No murmurs, rubs, or gallops  ABDOMEN: Soft, Nontender, Nondistended; Bowel sounds present  EXTREMITIES: WWP, No clubbing, cyanosis, or edema  Vascular: 2+ peripheral pulses x4  Consultant(s) Notes Reviewed:  [x ] YES  [ ] NO  Care Discussed with Consultants/Other Providers [ x] YES  [ ] NO    LABS:                        11.1   6.04  )-----------( 219      ( 10 Mar 2020 06:19 )             35.3     03-10    141  |  106  |  21  ----------------------------<  94  4.8   |  26  |  0.77    Ca    9.2      10 Mar 2020 06:19  Mg     2.3     03-10    TPro  6.1  /  Alb  3.3  /  TBili  0.6  /  DBili  x   /  AST  19  /  ALT  6<L>  /  AlkPhos  54  03-09          CAPILLARY BLOOD GLUCOSE                RADIOLOGY & ADDITIONAL TESTS:    Imaging Personally Reviewed:  [ ] YES  [ ] NO    HEALTH ISSUES - PROBLEM Dx:  Loss of weight: Loss of weight  Syncope, unspecified syncope type: Syncope, unspecified syncope type  Oral thrush: Oral thrush  Asthma: Asthma  Primary biliary cirrhosis: Primary biliary cirrhosis  Transition of care performed with sharing of clinical summary: Transition of care performed with sharing of clinical summary  Prophylactic measure: Prophylactic measure  Nutrition, metabolism, and development symptoms: Nutrition, metabolism, and development symptoms  HTN (hypertension): HTN (hypertension)  Hyperlipidemia: Hyperlipidemia  Hypothyroid: Hypothyroid  Fall, initial encounter: Fall, initial encounter CIELO SERRANO  79y  Female    Patient is a 79y old  Female who presents with a chief complaint of Fall (09 Mar 2020 09:32)      INTERVAL HPI/OVERNIGHT EVENTS: MELONIE o/n. Still very forgetful. No complaints this am.       T(C): 36.7 (03-10-20 @ 05:29), Max: 36.9 (03-09-20 @ 20:37)  HR: 56 (03-10-20 @ 05:29) (56 - 71)  BP: 151/78 (03-10-20 @ 05:29) (123/79 - 151/78)  RR: 16 (03-10-20 @ 05:29) (16 - 16)  SpO2: 96% (03-10-20 @ 05:29) (96% - 96%)  Wt(kg): --Vital Signs Last 24 Hrs  T(C): 36.7 (10 Mar 2020 05:29), Max: 36.9 (09 Mar 2020 20:37)  T(F): 98 (10 Mar 2020 05:29), Max: 98.5 (09 Mar 2020 20:37)  HR: 56 (10 Mar 2020 05:29) (56 - 71)  BP: 151/78 (10 Mar 2020 05:29) (123/79 - 151/78)  BP(mean): --  RR: 16 (10 Mar 2020 05:29) (16 - 16)  SpO2: 96% (10 Mar 2020 05:29) (96% - 96%)    PHYSICAL EXAM:  GENERAL: NAD  HEAD: Atraumatic, Normocephalic  EYES: Conjunctiva and sclera clear  ENMT: Moist mucous membranes  NECK: Supple, No JVD  NERVOUS SYSTEM:  Alert & Oriented X3. CN II-XII intact with no focal deficits   CHEST/LUNG: Clear to auscultation bilaterally; No rales, rhonchi, wheezing, or rubs  HEART: Regular rate and rhythm; No murmurs, rubs, or gallops  ABDOMEN: Soft, Nontender, Nondistended; Bowel sounds present  EXTREMITIES: WWP, No clubbing, cyanosis, or edema  Vascular: 2+ peripheral pulses x4    Consultant(s) Notes Reviewed:  [x ] YES  [ ] NO  Care Discussed with Consultants/Other Providers [ x] YES  [ ] NO    LABS:                        11.1   6.04  )-----------( 219      ( 10 Mar 2020 06:19 )             35.3     03-10    141  |  106  |  21  ----------------------------<  94  4.8   |  26  |  0.77    Ca    9.2      10 Mar 2020 06:19  Mg     2.3     03-10    TPro  6.1  /  Alb  3.3  /  TBili  0.6  /  DBili  x   /  AST  19  /  ALT  6<L>  /  AlkPhos  54  03-09          CAPILLARY BLOOD GLUCOSE                RADIOLOGY & ADDITIONAL TESTS:    Imaging Personally Reviewed:  [ ] YES  [ ] NO    HEALTH ISSUES - PROBLEM Dx:  Loss of weight: Loss of weight  Syncope, unspecified syncope type: Syncope, unspecified syncope type  Oral thrush: Oral thrush  Asthma: Asthma  Primary biliary cirrhosis: Primary biliary cirrhosis  Transition of care performed with sharing of clinical summary: Transition of care performed with sharing of clinical summary  Prophylactic measure: Prophylactic measure  Nutrition, metabolism, and development symptoms: Nutrition, metabolism, and development symptoms  HTN (hypertension): HTN (hypertension)  Hyperlipidemia: Hyperlipidemia  Hypothyroid: Hypothyroid  Fall, initial encounter: Fall, initial encounter

## 2020-03-10 NOTE — PROGRESS NOTE ADULT - PROBLEM SELECTOR PLAN 2
Patient found to be lethargic after being found down and found to have urinary incontinence, no tongue biting present. Hx of 3 CVA but no seizure history. Patient had one episode of urinary incontinence o/n.   - F/u EEG results  - Contact outpatient neurologist: No hx of seizure, last seen one year ago. Patient found to be lethargic after being found down and found to have urinary incontinence, no tongue biting present. Hx of 3 CVA but no seizure history. Patient had one episode of urinary incontinence o/n (Bladder scan 19cc, no overflow incontinence)  - F/u EEG results: Still only showing generalized slowing, stopping EEG   - Contact outpatient neurologist: No hx of seizure, last seen one year ago.

## 2020-03-10 NOTE — PROGRESS NOTE ADULT - ASSESSMENT
per Internal Medicine    A 78 yo F with PMH of CVA x 3 (no residual deficits), HTN, HLD, asthma, COPD, scoliosis BIBEMS after found lying on floor in bedroom, with LOC, no trauma, admitted for syncope and fall. Differential includes seizure vs. vasovagal vs. TIA.      Problem/Plan - 1:  ·  Problem: Syncope, unspecified syncope type.  Plan: Patient with syncopal episode, with no trauma, but observed LOC. Does not recall falling. Review of medication list does not warrant a drug induced association. No prodromal symptoms recalled. No history of arrythmia, none present on admission. CE negative. Ddx includes seizure vs.  vasovagal vs TIA. ECHO nml  - CTH/CTA negative  - Daily EKG  - Cardiology consulted, f/u recommendations  - Obtain collateral from outpatient cardiologist: No hx of arrythmia, recent ECHO showed preserved EF, no hx of syncope    #Fall   Found to be lying on the floor by daughter, no trauma, with LOC. 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   - CTH and CTA H/N unremarkable.  - Fully functional female at home, uses cane. Daughter is caretaker. No HHA.   - Physical Therapy  - Social Work.     Problem/Plan - 2:  ·  Problem: R/O Seizure.  Plan: Patient found to be lethargic after being found down and found to have urinary incontinence, no tongue biting present. Hx of 3 CVA but no seizure history. Patient had one episode of urinary incontinence o/n (Bladder scan 19cc, no overflow incontinence)  - F/u EEG results: Still only showing generalized slowing, stopping EEG   - Contact outpatient neurologist: No hx of seizure, last seen one year ago.    Problem/Plan - 3:  ·  Problem: Loss of weight.  Plan: Reports loss of weight; further workup as outpt if no urgent cause found  - A1C 4.8  - TSH WNL    #Dementia  Generalized slowing indicating dementia, which coincides with patient's forgetfulness.    Problem/Plan - 4:  ·  Problem: Oral thrush.  Plan: Likely in the setting of inhaled steroid.  - Nystatin Swish and Swallow 500,000units q8h.     Problem/Plan - 5:  ·  Problem: HTN (hypertension).  Plan: History of HTN on Ramipril 10mg daily. => Lisinopril 40mg daily conversion  - Currently hypertensive.   - Continue medication.     Problem/Plan - 6:  Problem: Hypothyroid. Plan: History of hypothyroidism, on Synthroid 88mcg daily. TSH normal   - Continue with medications    #HLD  History of HLD on Lipitor 20mg daily. Lipid profile WNL  - Continue medication.    Problem/Plan - 7:  ·  Problem: Asthma.  Plan: Continue with Albuterol and Ventolin inhaler. Continue with Singulair 10mg daily.    #Primary Billary Cirrhosis  - Continue with Ursodiol 1000mg daily.     Problem/Plan - 8:  ·  Problem: Nutrition, metabolism, and development symptoms.  Plan: F: None  E: Replete electrolytes PRN  N: DASH/TLC.     Problem/Plan - 9:  ·  Problem: Prophylactic measure.  Plan: DVT PPX: Lovenox 40mg daily  ETHICS: Full Code  DISPOSITION: [facility].

## 2020-03-10 NOTE — PROGRESS NOTE ADULT - PROBLEM SELECTOR PLAN 3
Reports loss of weight; further workup as outpt if no urgent cause found  - A1C 4.8  - TSH WNL Reports loss of weight; further workup as outpt if no urgent cause found  - A1C 4.8  - TSH WNL    #Dementia  Generalized slowing indicating dementia, which coincides with patient's forgetfulness

## 2020-03-10 NOTE — PROGRESS NOTE ADULT - ATTENDING COMMENTS
VEEG without seizure activity-showing generalized slowing  No definitive etiology of syncope, but seizure and CVA have been ruled out  Plan for discharge home today with HPT and HOT  Rest as above

## 2020-03-10 NOTE — DISCHARGE NOTE PROVIDER - HOSPITAL COURSE
#Discharge: do not delete        Patient is 78 yo F with past medical history of CVA x3, HTN, HLD, asthma, COPD, hypothyroidism     Presented with fall w/ possible syncope, found to have dementia and orthostatic hypotension     Problem List/Main Diagnoses (system-based):         NEURO:    #Possible syncope likely orthostatic hypotension     - Vitals showing a 17 point drop in SBP from laying to standing, this may be due to volume depletion. Patient did not come in on medications (beta blockers, CCBs, or diuretics) that could cause orthostasis     - vEEG [id number] was negative for any seizure activity. There was a suspicion since the patient had a post-ictal like state and urinary incontinence at the time of being found down         #Dementia    - vEEG showing diffuse brain slowing consistent with dementia     - Requiring home PT/OT as per our services         Inpatient treatment course:     New medications:     Labs to be followed outpatient:     Exam to be followed outpatient: #Discharge: do not delete        Patient is 78 yo F with past medical history of CVA x3, HTN, HLD, asthma, COPD, hypothyroidism     Presented with fall w/ possible syncope, found to have dementia and orthostatic hypotension     Problem List/Main Diagnoses (system-based):         NEURO:    #Possible syncope likely orthostatic hypotension     - Vitals showing a 17 point drop in SBP from laying to standing, this may be due to volume depletion. Patient did not come in on medications (beta blockers, CCBs, or diuretics) that could cause orthostasis     - vEEG [id number] was negative for any seizure activity. There was a suspicion since the patient had a post-ictal like state and urinary incontinence at the time of being found down     - All EKGs were not showing any abnormal arrhythmias that could be responsible for the syncope         #Dementia    - vEEG showing diffuse brain slowing consistent with dementia     - Requiring home PT/OT as per our services         Inpatient treatment course: Patient came in after a fall/syncope episode. The patient's blood sugar was normal, EKG didn't show any arrythmia and imagining of the head was negative. The patient was monitored on a EEG which only showed generalized brain wave slowing consistent with dementia. The patient's EKG continued to be normal. Vitals were indicative of orthostatic hypotension when laying down to standing. Patient is optimized for discharge with home PT and OT.     New medications:     Labs to be followed outpatient:     Exam to be followed outpatient:

## 2020-03-10 NOTE — PROGRESS NOTE ADULT - PROBLEM SELECTOR PLAN 10
1) PCP Contacted on Admission: (Y/N) --> Name & Phone #: Sukh Sapp [phone]  2) Date of Contact with PCP: Will call on 3/9   3) PCP Contacted at Discharge: (Y/N, N/A)  4) Summary of Handoff Given to PCP:   5) Post-Discharge Appointment Date and Location:

## 2020-03-10 NOTE — DISCHARGE NOTE PROVIDER - NSDCCPCAREPLAN_GEN_ALL_CORE_FT
PRINCIPAL DISCHARGE DIAGNOSIS  Diagnosis: Fall, initial encounter  Assessment and Plan of Treatment: You had a fall from an episode of passing out. You will be sent home with home physical therapy and occupational therapy help. The information to why you fell/ passed out is below.      SECONDARY DISCHARGE DIAGNOSES  Diagnosis: Syncope due to orthostatic hypotension  Assessment and Plan of Treatment: Orthostatic hypotension is a drop in blood pressure that can happen to some people when they stand up. This drop in blood pressure can make you feel dizzy or lightheaded. It can even make you pass out. Another term for orthostatic hypotension is "postural hypotension."  What are the symptoms of orthostatic hypotension?  The symptoms all happen when you stand up after sitting or lying down. They can include: Feeling lightheaded or dizzy, Blurry or dim vision, Weakness, Fainting (this is called syncope).   Your fainintg was likely due to poor oral intake and low volume status. We encourage oyu to drink a lot and maintain hydration. If oyu have another passing out episode again or feel any of the above symptoms please come back to the hosptial or see oyur doctor.

## 2020-03-10 NOTE — DISCHARGE NOTE PROVIDER - NSDCMRMEDTOKEN_GEN_ALL_CORE_FT
albuterol 1.25 mg/3 mL (0.042%) inhalation solution: 3 milliliter(s) inhaled 3 times a day, As Needed for shortness of breath   atorvastatin 20 mg oral tablet: 1 tab(s) orally once a day (at bedtime)  montelukast 10 mg oral tablet: 1 tab(s) orally once a day  pantoprazole 40 mg oral delayed release tablet: 1 tab(s) orally once a day (before a meal)  Pulmicort Respules 0.5 mg/2 mL inhalation suspension: 2 milliliter(s) inhaled 2 times a day  Synthroid 88 mcg (0.088 mg) oral tablet: 1 tab(s) orally once a day  ursodiol 500 mg oral tablet: 2 tab(s) orally once a day  Ventolin HFA 90 mcg/inh inhalation aerosol: 2 puff(s) inhaled every 4 hours albuterol 1.25 mg/3 mL (0.042%) inhalation solution: 3 milliliter(s) inhaled 3 times a day, As Needed for shortness of breath   atorvastatin 20 mg oral tablet: 1 tab(s) orally once a day (at bedtime)  montelukast 10 mg oral tablet: 1 tab(s) orally once a day  pantoprazole 40 mg oral delayed release tablet: 1 tab(s) orally once a day (before a meal)  Pulmicort Respules 0.5 mg/2 mL inhalation suspension: 2 milliliter(s) inhaled 2 times a day  ramipril 10 mg oral capsule: 1 cap(s) orally once a day  Synthroid 88 mcg (0.088 mg) oral tablet: 1 tab(s) orally once a day  ursodiol 500 mg oral tablet: 2 tab(s) orally once a day  Ventolin HFA 90 mcg/inh inhalation aerosol: 2 puff(s) inhaled every 4 hours

## 2020-03-10 NOTE — PROGRESS NOTE ADULT - PROBLEM SELECTOR PLAN 9
DVT PPX: Lovenox 40mg daily  ETHICS: Full Code  DISPOSITION: [facility]

## 2020-03-10 NOTE — PROGRESS NOTE ADULT - PROBLEM SELECTOR PLAN 1
Patient with syncopal episode, with no trauma, but observed LOC. Does not recall falling. Review of medication list does not warrant a drug induced association. No prodromal symptoms recalled. No history of arrythmia, none present on admission. CE negative. Ddx includes seizure vs.  vasovagal vs TIA. ECHO nml  - CTH/CTA negative  - Daily EKG  - Cardiology consulted, f/u recommendations  - Obtain collateral from outpatient cardiologist: No hx of arrythmia, recent ECHO showed preserved EF, no hx of syncope    #Fall   Found to be lying on the floor by daughter, no trauma, with LOC. 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   - CTH and CTA H/N unremarkable.  - Fully functional female at home, uses cane. Daughter is caretaker. No HHA.   - Physical Therapy  - Social Work

## 2020-03-10 NOTE — PROGRESS NOTE ADULT - SUBJECTIVE AND OBJECTIVE BOX
Physical Medicine and Rehabilitation Progress Note:    Patient is a 79y old  Female who presents with a chief complaint of Fall (10 Mar 2020 11:21)      HPI:  A 78 yo F with PMH of CVA x 3 (no residual deficits), HTN, HLD, asthma, COPD, scoliosis BIBEMS after found lying on floor in bedroom. As per daughter Zora ([phone]) she last saw her mother moving around in her room around 12pm. Zora states she realized she had not heard from her in awhile so she went to check on her around 6pm and found her lying on her back with her feet at the door. As soon as she opened the door she was awake and said she was fine. Patient denies trauma, but admits to LOC for a couple of seconds. Patient does not remember how she ended up on the floor or what she was doing prior to being on the floor. Reports no pain and feeling well now. Denies fevers, chills, CP, SOB, abdominal pain, N/V/D, urinary symptoms. As per daughter, last stroke was in Oct, seen at [facility], no residual deficits but has been slightly more confused since the stroke. Follows with cardiology and neurlogy at [facility].    In the ED, T(F): 97.3, HR: 68, BP: 146/85, RR: 20, SpO2: 100% on RA. Labs grossly normal. CTH unremarkable for hemorrhage. CTA H/N unremarkable. Patient admitted for management of mechanical fall. (08 Mar 2020 01:08)                            11.1   6.04  )-----------( 219      ( 10 Mar 2020 06:19 )             35.3       03-10    141  |  106  |  21  ----------------------------<  94  4.8   |  26  |  0.77    Ca    9.2      10 Mar 2020 06:19  Mg     2.3     03-10    TPro  6.1  /  Alb  3.3  /  TBili  0.6  /  DBili  x   /  AST  19  /  ALT  6<L>  /  AlkPhos  54  03-09    Vital Signs Last 24 Hrs  T(C): 36.6 (10 Mar 2020 13:34), Max: 36.9 (09 Mar 2020 20:37)  T(F): 97.8 (10 Mar 2020 13:34), Max: 98.5 (09 Mar 2020 20:37)  HR: 74 (10 Mar 2020 13:34) (56 - 74)  BP: 180/106 (10 Mar 2020 13:34) (123/79 - 180/106)  BP(mean): --  RR: 16 (10 Mar 2020 13:34) (16 - 16)  SpO2: 98% (10 Mar 2020 13:34) (96% - 98%)    MEDICATIONS  (STANDING):  atorvastatin 20 milliGRAM(s) Oral at bedtime  enoxaparin Injectable 40 milliGRAM(s) SubCutaneous every 24 hours  influenza   Vaccine 0.5 milliLiter(s) IntraMuscular once  levothyroxine 88 MICROGram(s) Oral daily  lisinopril 40 milliGRAM(s) Oral every 24 hours  montelukast 10 milliGRAM(s) Oral daily  nystatin    Suspension 190835 Unit(s) Oral every 6 hours  pantoprazole    Tablet 40 milliGRAM(s) Oral before breakfast  ursodiol Tablet 1000 milliGRAM(s) Oral <User Schedule>    MEDICATIONS  (PRN):  ALBUTerol   0.042% 1.25 milliGRAM(s) Nebulizer three times a day PRN Shortness of Breath and/or Wheezing    Currently Undergoing Physical Therapy at bedside.    Functional Status Assessment:     Cognitive/Perceptual/Neuro  Cognitive/Neuro/Behavioral [WDL Definition: Alert; opens eyes spontaneously; arouses to voice or touch; oriented x 4; follows commands; speech spontaneous, logical; purposeful motor response; behavior appropriate to situation]: WDL except  Level of Consciousness: confused;  alert    Therapeutic Interventions      Bed Mobility  Bed Mobility Training Scooting: independent  Bed Mobility Training Sit-to-Supine: independent  Bed Mobility Training Supine-to-Sit: independent    Sit-Stand Transfer Training  Transfer Training Sit-to-Stand Transfer: independent;  rolling walker;  weight-bearing as tolerated  Transfer Training Stand-to-Sit Transfer: independent;  weight-bearing as tolerated   rolling walker    Gait Training  Gait Training: supervsion;  supervision;  verbal cues;  weight-bearing as tolerated   rolling walker;  150 feet  Gait Analysis: 3-point gait   decreased rosalina;  decreased step length;  decreased stride length;  cognitive, decreased safety awareness;  impaired balance;  150 feet;  rolling walker          PM&R Impression: as above    Current Disposition Plan Recommendations:  d/c home, home physical therapy, home care services

## 2020-03-14 DIAGNOSIS — Z86.73 PERSONAL HISTORY OF TRANSIENT ISCHEMIC ATTACK (TIA), AND CEREBRAL INFARCTION WITHOUT RESIDUAL DEFICITS: ICD-10-CM

## 2020-03-14 DIAGNOSIS — Z87.891 PERSONAL HISTORY OF NICOTINE DEPENDENCE: ICD-10-CM

## 2020-03-14 DIAGNOSIS — R32 UNSPECIFIED URINARY INCONTINENCE: ICD-10-CM

## 2020-03-14 DIAGNOSIS — F32.9 MAJOR DEPRESSIVE DISORDER, SINGLE EPISODE, UNSPECIFIED: ICD-10-CM

## 2020-03-14 DIAGNOSIS — Z79.52 LONG TERM (CURRENT) USE OF SYSTEMIC STEROIDS: ICD-10-CM

## 2020-03-14 DIAGNOSIS — R55 SYNCOPE AND COLLAPSE: ICD-10-CM

## 2020-03-14 DIAGNOSIS — J45.909 UNSPECIFIED ASTHMA, UNCOMPLICATED: ICD-10-CM

## 2020-03-14 DIAGNOSIS — K21.9 GASTRO-ESOPHAGEAL REFLUX DISEASE WITHOUT ESOPHAGITIS: ICD-10-CM

## 2020-03-14 DIAGNOSIS — B37.0 CANDIDAL STOMATITIS: ICD-10-CM

## 2020-03-14 DIAGNOSIS — Z79.51 LONG TERM (CURRENT) USE OF INHALED STEROIDS: ICD-10-CM

## 2020-03-14 DIAGNOSIS — M41.9 SCOLIOSIS, UNSPECIFIED: ICD-10-CM

## 2020-03-14 DIAGNOSIS — F03.90 UNSPECIFIED DEMENTIA WITHOUT BEHAVIORAL DISTURBANCE: ICD-10-CM

## 2020-03-14 DIAGNOSIS — K74.3 PRIMARY BILIARY CIRRHOSIS: ICD-10-CM

## 2020-03-14 DIAGNOSIS — J44.9 CHRONIC OBSTRUCTIVE PULMONARY DISEASE, UNSPECIFIED: ICD-10-CM

## 2020-03-14 DIAGNOSIS — E03.9 HYPOTHYROIDISM, UNSPECIFIED: ICD-10-CM

## 2020-03-14 DIAGNOSIS — E43 UNSPECIFIED SEVERE PROTEIN-CALORIE MALNUTRITION: ICD-10-CM

## 2020-03-14 DIAGNOSIS — E78.5 HYPERLIPIDEMIA, UNSPECIFIED: ICD-10-CM

## 2020-03-14 DIAGNOSIS — I10 ESSENTIAL (PRIMARY) HYPERTENSION: ICD-10-CM

## 2020-07-16 ENCOUNTER — EMERGENCY (EMERGENCY)
Facility: HOSPITAL | Age: 80
LOS: 1 days | Discharge: HOME | End: 2020-07-16
Attending: EMERGENCY MEDICINE | Admitting: EMERGENCY MEDICINE
Payer: MEDICARE

## 2020-07-16 VITALS
RESPIRATION RATE: 18 BRPM | TEMPERATURE: 98 F | DIASTOLIC BLOOD PRESSURE: 102 MMHG | HEART RATE: 76 BPM | HEIGHT: 56 IN | OXYGEN SATURATION: 100 % | WEIGHT: 74.96 LBS | SYSTOLIC BLOOD PRESSURE: 160 MMHG

## 2020-07-16 VITALS
OXYGEN SATURATION: 95 % | RESPIRATION RATE: 18 BRPM | SYSTOLIC BLOOD PRESSURE: 108 MMHG | DIASTOLIC BLOOD PRESSURE: 67 MMHG | HEART RATE: 83 BPM | TEMPERATURE: 98 F

## 2020-07-16 DIAGNOSIS — Z98.49 CATARACT EXTRACTION STATUS, UNSPECIFIED EYE: Chronic | ICD-10-CM

## 2020-07-16 DIAGNOSIS — Z98.89 OTHER SPECIFIED POSTPROCEDURAL STATES: Chronic | ICD-10-CM

## 2020-07-16 DIAGNOSIS — Z90.49 ACQUIRED ABSENCE OF OTHER SPECIFIED PARTS OF DIGESTIVE TRACT: Chronic | ICD-10-CM

## 2020-07-16 DIAGNOSIS — Z98.890 OTHER SPECIFIED POSTPROCEDURAL STATES: Chronic | ICD-10-CM

## 2020-07-16 LAB
ALBUMIN SERPL ELPH-MCNC: 4 G/DL — SIGNIFICANT CHANGE UP (ref 3.3–5)
ALP SERPL-CCNC: 66 U/L — SIGNIFICANT CHANGE UP (ref 40–120)
ALT FLD-CCNC: 12 U/L — SIGNIFICANT CHANGE UP (ref 10–45)
ANION GAP SERPL CALC-SCNC: 10 MMOL/L — SIGNIFICANT CHANGE UP (ref 5–17)
APTT BLD: 28.8 SEC — SIGNIFICANT CHANGE UP (ref 27.5–35.5)
AST SERPL-CCNC: 24 U/L — SIGNIFICANT CHANGE UP (ref 10–40)
BASOPHILS # BLD AUTO: 0.05 K/UL — SIGNIFICANT CHANGE UP (ref 0–0.2)
BASOPHILS NFR BLD AUTO: 0.7 % — SIGNIFICANT CHANGE UP (ref 0–2)
BILIRUB SERPL-MCNC: 0.5 MG/DL — SIGNIFICANT CHANGE UP (ref 0.2–1.2)
BUN SERPL-MCNC: 18 MG/DL — SIGNIFICANT CHANGE UP (ref 7–23)
CALCIUM SERPL-MCNC: 9.6 MG/DL — SIGNIFICANT CHANGE UP (ref 8.4–10.5)
CHLORIDE SERPL-SCNC: 103 MMOL/L — SIGNIFICANT CHANGE UP (ref 96–108)
CO2 SERPL-SCNC: 28 MMOL/L — SIGNIFICANT CHANGE UP (ref 22–31)
CREAT SERPL-MCNC: 0.83 MG/DL — SIGNIFICANT CHANGE UP (ref 0.5–1.3)
EOSINOPHIL # BLD AUTO: 0.35 K/UL — SIGNIFICANT CHANGE UP (ref 0–0.5)
EOSINOPHIL NFR BLD AUTO: 5.2 % — SIGNIFICANT CHANGE UP (ref 0–6)
GAS PNL BLDV: SIGNIFICANT CHANGE UP
GLUCOSE SERPL-MCNC: 113 MG/DL — HIGH (ref 70–99)
HCT VFR BLD CALC: 36.8 % — SIGNIFICANT CHANGE UP (ref 34.5–45)
HGB BLD-MCNC: 12 G/DL — SIGNIFICANT CHANGE UP (ref 11.5–15.5)
IMM GRANULOCYTES NFR BLD AUTO: 0.3 % — SIGNIFICANT CHANGE UP (ref 0–1.5)
INR BLD: 1.05 — SIGNIFICANT CHANGE UP (ref 0.88–1.16)
LACTATE SERPL-SCNC: 1.5 MMOL/L — SIGNIFICANT CHANGE UP (ref 0.5–2)
LYMPHOCYTES # BLD AUTO: 0.64 K/UL — LOW (ref 1–3.3)
LYMPHOCYTES # BLD AUTO: 9.5 % — LOW (ref 13–44)
MCHC RBC-ENTMCNC: 28.6 PG — SIGNIFICANT CHANGE UP (ref 27–34)
MCHC RBC-ENTMCNC: 32.6 GM/DL — SIGNIFICANT CHANGE UP (ref 32–36)
MCV RBC AUTO: 87.8 FL — SIGNIFICANT CHANGE UP (ref 80–100)
MONOCYTES # BLD AUTO: 0.72 K/UL — SIGNIFICANT CHANGE UP (ref 0–0.9)
MONOCYTES NFR BLD AUTO: 10.7 % — SIGNIFICANT CHANGE UP (ref 2–14)
NEUTROPHILS # BLD AUTO: 4.94 K/UL — SIGNIFICANT CHANGE UP (ref 1.8–7.4)
NEUTROPHILS NFR BLD AUTO: 73.6 % — SIGNIFICANT CHANGE UP (ref 43–77)
NRBC # BLD: 0 /100 WBCS — SIGNIFICANT CHANGE UP (ref 0–0)
NT-PROBNP SERPL-SCNC: 201 PG/ML — SIGNIFICANT CHANGE UP (ref 0–300)
PLATELET # BLD AUTO: 220 K/UL — SIGNIFICANT CHANGE UP (ref 150–400)
POTASSIUM SERPL-MCNC: 4.4 MMOL/L — SIGNIFICANT CHANGE UP (ref 3.5–5.3)
POTASSIUM SERPL-SCNC: 4.4 MMOL/L — SIGNIFICANT CHANGE UP (ref 3.5–5.3)
PROT SERPL-MCNC: 6.6 G/DL — SIGNIFICANT CHANGE UP (ref 6–8.3)
PROTHROM AB SERPL-ACNC: 12.6 SEC — SIGNIFICANT CHANGE UP (ref 10.6–13.6)
RBC # BLD: 4.19 M/UL — SIGNIFICANT CHANGE UP (ref 3.8–5.2)
RBC # FLD: 15.2 % — HIGH (ref 10.3–14.5)
SODIUM SERPL-SCNC: 141 MMOL/L — SIGNIFICANT CHANGE UP (ref 135–145)
TROPONIN T SERPL-MCNC: <0.01 NG/ML — SIGNIFICANT CHANGE UP (ref 0–0.01)
WBC # BLD: 6.72 K/UL — SIGNIFICANT CHANGE UP (ref 3.8–10.5)
WBC # FLD AUTO: 6.72 K/UL — SIGNIFICANT CHANGE UP (ref 3.8–10.5)

## 2020-07-16 PROCEDURE — 36415 COLL VENOUS BLD VENIPUNCTURE: CPT

## 2020-07-16 PROCEDURE — 85730 THROMBOPLASTIN TIME PARTIAL: CPT

## 2020-07-16 PROCEDURE — 93005 ELECTROCARDIOGRAM TRACING: CPT | Mod: 76

## 2020-07-16 PROCEDURE — 80053 COMPREHEN METABOLIC PANEL: CPT

## 2020-07-16 PROCEDURE — 87040 BLOOD CULTURE FOR BACTERIA: CPT

## 2020-07-16 PROCEDURE — 99291 CRITICAL CARE FIRST HOUR: CPT | Mod: 25

## 2020-07-16 PROCEDURE — 84295 ASSAY OF SERUM SODIUM: CPT

## 2020-07-16 PROCEDURE — 84484 ASSAY OF TROPONIN QUANT: CPT

## 2020-07-16 PROCEDURE — 82330 ASSAY OF CALCIUM: CPT

## 2020-07-16 PROCEDURE — 85610 PROTHROMBIN TIME: CPT

## 2020-07-16 PROCEDURE — 71045 X-RAY EXAM CHEST 1 VIEW: CPT | Mod: 26

## 2020-07-16 PROCEDURE — 83605 ASSAY OF LACTIC ACID: CPT

## 2020-07-16 PROCEDURE — 94640 AIRWAY INHALATION TREATMENT: CPT

## 2020-07-16 PROCEDURE — 83880 ASSAY OF NATRIURETIC PEPTIDE: CPT

## 2020-07-16 PROCEDURE — 84132 ASSAY OF SERUM POTASSIUM: CPT

## 2020-07-16 PROCEDURE — 82803 BLOOD GASES ANY COMBINATION: CPT

## 2020-07-16 PROCEDURE — 71045 X-RAY EXAM CHEST 1 VIEW: CPT

## 2020-07-16 PROCEDURE — 99291 CRITICAL CARE FIRST HOUR: CPT | Mod: CS

## 2020-07-16 PROCEDURE — 85025 COMPLETE CBC W/AUTO DIFF WBC: CPT

## 2020-07-16 PROCEDURE — 96375 TX/PRO/DX INJ NEW DRUG ADDON: CPT

## 2020-07-16 PROCEDURE — 96374 THER/PROPH/DIAG INJ IV PUSH: CPT

## 2020-07-16 PROCEDURE — 93010 ELECTROCARDIOGRAM REPORT: CPT

## 2020-07-16 PROCEDURE — U0003: CPT

## 2020-07-16 RX ORDER — ALBUTEROL 90 UG/1
2 AEROSOL, METERED ORAL
Qty: 1 | Refills: 0
Start: 2020-07-16

## 2020-07-16 RX ORDER — MAGNESIUM SULFATE 500 MG/ML
2 VIAL (ML) INJECTION ONCE
Refills: 0 | Status: COMPLETED | OUTPATIENT
Start: 2020-07-16 | End: 2020-07-16

## 2020-07-16 RX ORDER — IPRATROPIUM/ALBUTEROL SULFATE 18-103MCG
3 AEROSOL WITH ADAPTER (GRAM) INHALATION ONCE
Refills: 0 | Status: COMPLETED | OUTPATIENT
Start: 2020-07-16 | End: 2020-07-16

## 2020-07-16 RX ORDER — ALBUTEROL 90 UG/1
2.5 AEROSOL, METERED ORAL ONCE
Refills: 0 | Status: COMPLETED | OUTPATIENT
Start: 2020-07-16 | End: 2020-07-16

## 2020-07-16 RX ADMIN — Medication 50 GRAM(S): at 18:54

## 2020-07-16 RX ADMIN — Medication 3 MILLILITER(S): at 18:00

## 2020-07-16 RX ADMIN — Medication 3 MILLILITER(S): at 18:23

## 2020-07-16 RX ADMIN — Medication 80 MILLIGRAM(S): at 18:10

## 2020-07-16 RX ADMIN — ALBUTEROL 2.5 MILLIGRAM(S): 90 AEROSOL, METERED ORAL at 19:38

## 2020-07-16 NOTE — ED PROVIDER NOTE - OBJECTIVE STATEMENT
78 y/o F PMHx CVA x3, HTN, HLD, asthma, COPD and hypothyroidism presents to the ED with c/o SOB since yesterday w/ associated bilateral symmetric leg swelling w/ no pain(unsure when it started). Denies chronic pedal edema. Denies cardiac Hx, CP, known COVID exposure, fevers, cough, N/V/D, urinary sx. Pt living w/ daughter who recently tested neg on COVID swab. Pt has no albuterol at home and is currently not taking any meds.

## 2020-07-16 NOTE — ED ADULT TRIAGE NOTE - ARRIVAL INFO ADDITIONAL COMMENTS
c.o sob for several days with chronic cough due to asthma. denies any chest pain, fever/chills. c.o sob for several days with chronic cough due to asthma. denies any chest pain, fever/chills. states she ran out of her albuterol inhaler.

## 2020-07-16 NOTE — ED PROVIDER NOTE - CLINICAL SUMMARY MEDICAL DECISION MAKING FREE TEXT BOX
78 y/o F PMHx CVA x3, HTN, HLD, asthma, COPD and hypothyroidism p/w likely asthma exacerbation. Will do CXR and send COVID swab secondary to COVID19 pandemic. Will place pt in a closed room given Nebs, magnesium and IV Solumedrol and reassess. Dispo pending pt's clinical improvement.

## 2020-07-16 NOTE — ED PROVIDER NOTE - PATIENT PORTAL LINK FT
You can access the FollowMyHealth Patient Portal offered by [facility] by registering at the following website: [email or web address]. By joining [employer]’s FollowMyHealth portal, you will also be able to view your health information using other applications (apps) compatible with our system.

## 2020-07-16 NOTE — ED ADULT NURSE REASSESSMENT NOTE - NS ED NURSE REASSESS COMMENT FT1
Pt. reports improvement in SOB after IV Mg and albuterol neb. Pt. able to ambulate in ED with one person assist with slow steady gait. MD Smith aware, pt. to be discharged.

## 2020-07-16 NOTE — ED ADULT NURSE NOTE - OBJECTIVE STATEMENT
Pt. BIBA from home, c/o SOB with productive cough since last night. Pt. with albuterol inhaler in pocket, unsure if she used med PTA. Speaking in clear complete sentences on RA, satting 100% on RA. Denies fever, chills, body aches, CP.

## 2020-07-16 NOTE — ED PROVIDER NOTE - PHYSICAL EXAMINATION
CONSTITUTIONAL: Well-appearing; well-nourished; in no apparent distress.   HEAD: Normocephalic; atraumatic.   EYES:  conjunctiva and sclera clear  ENT: normal nose; no rhinorrhea; normal pharynx with no erythema or lesions.   NECK: Supple; non-tender;   CARDIOVASCULAR: Normal S1, S2; no murmurs, rubs, or gallops. Regular rate and rhythm.   RESPIRATORY: Poor air movement w/ intercostal retractions. Increased rate and effort  GI: Soft; non-distended; non-tender; no palpable organomegaly.   EXT: Mild non-pitting bilateral symmetric pedal edema.   SKIN: Normal for age and race; warm; dry; good turgor; no apparent lesions or rash.   NEURO: A & O x 3; face symmetric; grossly unremarkable.   PSYCHOLOGICAL: The patient’s mood and manner are appropriate.

## 2020-07-16 NOTE — ED ADULT NURSE REASSESSMENT NOTE - NS ED NURSE REASSESS COMMENT FT1
Per unit clejesica Smith, ambulance transportation arranged, ETA within the hour. Pt.'s daughter Zora made aware by phone. Pt. given juice and sandwich.

## 2020-07-16 NOTE — ED ADULT NURSE NOTE - PSH
H/O knee surgery    History of cholecystectomy    History of Nissen fundoplication    S/P cataract surgery

## 2020-07-17 LAB — SARS-COV-2 RNA SPEC QL NAA+PROBE: SIGNIFICANT CHANGE UP

## 2020-07-20 DIAGNOSIS — Z20.828 CONTACT WITH AND (SUSPECTED) EXPOSURE TO OTHER VIRAL COMMUNICABLE DISEASES: ICD-10-CM

## 2020-07-20 DIAGNOSIS — R06.02 SHORTNESS OF BREATH: ICD-10-CM

## 2020-07-20 DIAGNOSIS — J45.901 UNSPECIFIED ASTHMA WITH (ACUTE) EXACERBATION: ICD-10-CM

## 2020-07-21 LAB
CULTURE RESULTS: SIGNIFICANT CHANGE UP
CULTURE RESULTS: SIGNIFICANT CHANGE UP
SPECIMEN SOURCE: SIGNIFICANT CHANGE UP
SPECIMEN SOURCE: SIGNIFICANT CHANGE UP

## 2020-10-26 ENCOUNTER — EMERGENCY (EMERGENCY)
Facility: HOSPITAL | Age: 80
LOS: 1 days | Discharge: HOME | End: 2020-10-26
Attending: EMERGENCY MEDICINE | Admitting: EMERGENCY MEDICINE
Payer: MEDICARE

## 2020-10-26 VITALS
DIASTOLIC BLOOD PRESSURE: 82 MMHG | HEART RATE: 79 BPM | OXYGEN SATURATION: 99 % | RESPIRATION RATE: 18 BRPM | SYSTOLIC BLOOD PRESSURE: 169 MMHG

## 2020-10-26 VITALS
RESPIRATION RATE: 16 BRPM | HEIGHT: 56 IN | OXYGEN SATURATION: 97 % | SYSTOLIC BLOOD PRESSURE: 196 MMHG | DIASTOLIC BLOOD PRESSURE: 111 MMHG | TEMPERATURE: 98 F | WEIGHT: 100.09 LBS | HEART RATE: 79 BPM

## 2020-10-26 DIAGNOSIS — Z20.828 CONTACT WITH AND (SUSPECTED) EXPOSURE TO OTHER VIRAL COMMUNICABLE DISEASES: ICD-10-CM

## 2020-10-26 DIAGNOSIS — Z98.49 CATARACT EXTRACTION STATUS, UNSPECIFIED EYE: Chronic | ICD-10-CM

## 2020-10-26 DIAGNOSIS — R42 DIZZINESS AND GIDDINESS: ICD-10-CM

## 2020-10-26 DIAGNOSIS — Z90.49 ACQUIRED ABSENCE OF OTHER SPECIFIED PARTS OF DIGESTIVE TRACT: Chronic | ICD-10-CM

## 2020-10-26 DIAGNOSIS — Z98.890 OTHER SPECIFIED POSTPROCEDURAL STATES: Chronic | ICD-10-CM

## 2020-10-26 DIAGNOSIS — Z98.89 OTHER SPECIFIED POSTPROCEDURAL STATES: Chronic | ICD-10-CM

## 2020-10-26 DIAGNOSIS — R91.1 SOLITARY PULMONARY NODULE: ICD-10-CM

## 2020-10-26 DIAGNOSIS — N39.0 URINARY TRACT INFECTION, SITE NOT SPECIFIED: ICD-10-CM

## 2020-10-26 LAB
ALBUMIN SERPL ELPH-MCNC: 3.7 G/DL — SIGNIFICANT CHANGE UP (ref 3.3–5)
ALP SERPL-CCNC: 68 U/L — SIGNIFICANT CHANGE UP (ref 40–120)
ALT FLD-CCNC: 12 U/L — SIGNIFICANT CHANGE UP (ref 10–45)
ANION GAP SERPL CALC-SCNC: 9 MMOL/L — SIGNIFICANT CHANGE UP (ref 5–17)
APPEARANCE UR: CLEAR — SIGNIFICANT CHANGE UP
AST SERPL-CCNC: 22 U/L — SIGNIFICANT CHANGE UP (ref 10–40)
BACTERIA # UR AUTO: ABNORMAL /HPF
BASOPHILS # BLD AUTO: 0.03 K/UL — SIGNIFICANT CHANGE UP (ref 0–0.2)
BASOPHILS NFR BLD AUTO: 0.4 % — SIGNIFICANT CHANGE UP (ref 0–2)
BILIRUB SERPL-MCNC: 0.3 MG/DL — SIGNIFICANT CHANGE UP (ref 0.2–1.2)
BILIRUB UR-MCNC: NEGATIVE — SIGNIFICANT CHANGE UP
BUN SERPL-MCNC: 15 MG/DL — SIGNIFICANT CHANGE UP (ref 7–23)
CALCIUM SERPL-MCNC: 9.4 MG/DL — SIGNIFICANT CHANGE UP (ref 8.4–10.5)
CHLORIDE SERPL-SCNC: 106 MMOL/L — SIGNIFICANT CHANGE UP (ref 96–108)
CO2 SERPL-SCNC: 29 MMOL/L — SIGNIFICANT CHANGE UP (ref 22–31)
COLOR SPEC: YELLOW — SIGNIFICANT CHANGE UP
CREAT SERPL-MCNC: 0.77 MG/DL — SIGNIFICANT CHANGE UP (ref 0.5–1.3)
DIFF PNL FLD: NEGATIVE — SIGNIFICANT CHANGE UP
EOSINOPHIL # BLD AUTO: 0.5 K/UL — SIGNIFICANT CHANGE UP (ref 0–0.5)
EOSINOPHIL NFR BLD AUTO: 6.7 % — HIGH (ref 0–6)
EPI CELLS # UR: SIGNIFICANT CHANGE UP /HPF (ref 0–5)
GLUCOSE SERPL-MCNC: 92 MG/DL — SIGNIFICANT CHANGE UP (ref 70–99)
GLUCOSE UR QL: NEGATIVE — SIGNIFICANT CHANGE UP
HCT VFR BLD CALC: 36.9 % — SIGNIFICANT CHANGE UP (ref 34.5–45)
HGB BLD-MCNC: 11.7 G/DL — SIGNIFICANT CHANGE UP (ref 11.5–15.5)
IMM GRANULOCYTES NFR BLD AUTO: 0.3 % — SIGNIFICANT CHANGE UP (ref 0–1.5)
KETONES UR-MCNC: NEGATIVE — SIGNIFICANT CHANGE UP
LEUKOCYTE ESTERASE UR-ACNC: ABNORMAL
LYMPHOCYTES # BLD AUTO: 1.07 K/UL — SIGNIFICANT CHANGE UP (ref 1–3.3)
LYMPHOCYTES # BLD AUTO: 14.2 % — SIGNIFICANT CHANGE UP (ref 13–44)
MCHC RBC-ENTMCNC: 28 PG — SIGNIFICANT CHANGE UP (ref 27–34)
MCHC RBC-ENTMCNC: 31.7 GM/DL — LOW (ref 32–36)
MCV RBC AUTO: 88.3 FL — SIGNIFICANT CHANGE UP (ref 80–100)
MONOCYTES # BLD AUTO: 0.7 K/UL — SIGNIFICANT CHANGE UP (ref 0–0.9)
MONOCYTES NFR BLD AUTO: 9.3 % — SIGNIFICANT CHANGE UP (ref 2–14)
NEUTROPHILS # BLD AUTO: 5.19 K/UL — SIGNIFICANT CHANGE UP (ref 1.8–7.4)
NEUTROPHILS NFR BLD AUTO: 69.1 % — SIGNIFICANT CHANGE UP (ref 43–77)
NITRITE UR-MCNC: POSITIVE
NRBC # BLD: 0 /100 WBCS — SIGNIFICANT CHANGE UP (ref 0–0)
PH UR: 6 — SIGNIFICANT CHANGE UP (ref 5–8)
PLATELET # BLD AUTO: 272 K/UL — SIGNIFICANT CHANGE UP (ref 150–400)
POTASSIUM SERPL-MCNC: 4.3 MMOL/L — SIGNIFICANT CHANGE UP (ref 3.5–5.3)
POTASSIUM SERPL-SCNC: 4.3 MMOL/L — SIGNIFICANT CHANGE UP (ref 3.5–5.3)
PROT SERPL-MCNC: 6.5 G/DL — SIGNIFICANT CHANGE UP (ref 6–8.3)
PROT UR-MCNC: NEGATIVE MG/DL — SIGNIFICANT CHANGE UP
RBC # BLD: 4.18 M/UL — SIGNIFICANT CHANGE UP (ref 3.8–5.2)
RBC # FLD: 14.5 % — SIGNIFICANT CHANGE UP (ref 10.3–14.5)
RBC CASTS # UR COMP ASSIST: < 5 /HPF — SIGNIFICANT CHANGE UP
SARS-COV-2 RNA SPEC QL NAA+PROBE: SIGNIFICANT CHANGE UP
SODIUM SERPL-SCNC: 144 MMOL/L — SIGNIFICANT CHANGE UP (ref 135–145)
SP GR SPEC: <=1.005 — SIGNIFICANT CHANGE UP (ref 1–1.03)
TROPONIN T SERPL-MCNC: <0.01 NG/ML — SIGNIFICANT CHANGE UP (ref 0–0.01)
UROBILINOGEN FLD QL: 0.2 E.U./DL — SIGNIFICANT CHANGE UP
WBC # BLD: 7.51 K/UL — SIGNIFICANT CHANGE UP (ref 3.8–10.5)
WBC # FLD AUTO: 7.51 K/UL — SIGNIFICANT CHANGE UP (ref 3.8–10.5)
WBC UR QL: ABNORMAL /HPF

## 2020-10-26 PROCEDURE — 81001 URINALYSIS AUTO W/SCOPE: CPT

## 2020-10-26 PROCEDURE — 70450 CT HEAD/BRAIN W/O DYE: CPT

## 2020-10-26 PROCEDURE — 93010 ELECTROCARDIOGRAM REPORT: CPT

## 2020-10-26 PROCEDURE — 71275 CT ANGIOGRAPHY CHEST: CPT | Mod: 26

## 2020-10-26 PROCEDURE — 96375 TX/PRO/DX INJ NEW DRUG ADDON: CPT

## 2020-10-26 PROCEDURE — 99285 EMERGENCY DEPT VISIT HI MDM: CPT

## 2020-10-26 PROCEDURE — 93005 ELECTROCARDIOGRAM TRACING: CPT

## 2020-10-26 PROCEDURE — 85025 COMPLETE CBC W/AUTO DIFF WBC: CPT

## 2020-10-26 PROCEDURE — 71046 X-RAY EXAM CHEST 2 VIEWS: CPT

## 2020-10-26 PROCEDURE — 83880 ASSAY OF NATRIURETIC PEPTIDE: CPT

## 2020-10-26 PROCEDURE — 87186 SC STD MICRODIL/AGAR DIL: CPT

## 2020-10-26 PROCEDURE — 71046 X-RAY EXAM CHEST 2 VIEWS: CPT | Mod: 26

## 2020-10-26 PROCEDURE — 87086 URINE CULTURE/COLONY COUNT: CPT

## 2020-10-26 PROCEDURE — 96365 THER/PROPH/DIAG IV INF INIT: CPT | Mod: XU

## 2020-10-26 PROCEDURE — 36415 COLL VENOUS BLD VENIPUNCTURE: CPT

## 2020-10-26 PROCEDURE — U0003: CPT

## 2020-10-26 PROCEDURE — 96361 HYDRATE IV INFUSION ADD-ON: CPT

## 2020-10-26 PROCEDURE — 80053 COMPREHEN METABOLIC PANEL: CPT

## 2020-10-26 PROCEDURE — 70450 CT HEAD/BRAIN W/O DYE: CPT | Mod: 26

## 2020-10-26 PROCEDURE — 84484 ASSAY OF TROPONIN QUANT: CPT

## 2020-10-26 PROCEDURE — 71275 CT ANGIOGRAPHY CHEST: CPT

## 2020-10-26 PROCEDURE — 99285 EMERGENCY DEPT VISIT HI MDM: CPT | Mod: 25

## 2020-10-26 RX ORDER — CEFTRIAXONE 500 MG/1
1000 INJECTION, POWDER, FOR SOLUTION INTRAMUSCULAR; INTRAVENOUS ONCE
Refills: 0 | Status: COMPLETED | OUTPATIENT
Start: 2020-10-26 | End: 2020-10-26

## 2020-10-26 RX ORDER — SODIUM CHLORIDE 9 MG/ML
1000 INJECTION INTRAMUSCULAR; INTRAVENOUS; SUBCUTANEOUS ONCE
Refills: 0 | Status: COMPLETED | OUTPATIENT
Start: 2020-10-26 | End: 2020-10-26

## 2020-10-26 RX ORDER — HYDRALAZINE HCL 50 MG
10 TABLET ORAL ONCE
Refills: 0 | Status: COMPLETED | OUTPATIENT
Start: 2020-10-26 | End: 2020-10-26

## 2020-10-26 RX ORDER — LISINOPRIL 2.5 MG/1
10 TABLET ORAL DAILY
Refills: 0 | Status: DISCONTINUED | OUTPATIENT
Start: 2020-10-26 | End: 2020-10-26

## 2020-10-26 RX ORDER — LISINOPRIL 2.5 MG/1
10 TABLET ORAL ONCE
Refills: 0 | Status: COMPLETED | OUTPATIENT
Start: 2020-10-26 | End: 2020-10-26

## 2020-10-26 RX ORDER — CEFPODOXIME PROXETIL 100 MG
1 TABLET ORAL
Qty: 20 | Refills: 0
Start: 2020-10-26 | End: 2020-11-04

## 2020-10-26 RX ADMIN — SODIUM CHLORIDE 1000 MILLILITER(S): 9 INJECTION INTRAMUSCULAR; INTRAVENOUS; SUBCUTANEOUS at 19:45

## 2020-10-26 RX ADMIN — LISINOPRIL 10 MILLIGRAM(S): 2.5 TABLET ORAL at 19:52

## 2020-10-26 RX ADMIN — CEFTRIAXONE 100 MILLIGRAM(S): 500 INJECTION, POWDER, FOR SOLUTION INTRAMUSCULAR; INTRAVENOUS at 19:44

## 2020-10-26 RX ADMIN — Medication 10 MILLIGRAM(S): at 19:35

## 2020-10-26 RX ADMIN — SODIUM CHLORIDE 1000 MILLILITER(S): 9 INJECTION INTRAMUSCULAR; INTRAVENOUS; SUBCUTANEOUS at 20:45

## 2020-10-26 RX ADMIN — CEFTRIAXONE 1000 MILLIGRAM(S): 500 INJECTION, POWDER, FOR SOLUTION INTRAMUSCULAR; INTRAVENOUS at 20:15

## 2020-10-26 NOTE — ED ADULT NURSE NOTE - OBJECTIVE STATEMENT
Patient arrives via EMS, report stating patient's daughter called 911 because she was shaky and dizzy.  Upon arrival to ED patient states shes "not feeling too great," denies chest pain/SOB/dizziness/blurred vision.  No neuro deficits noted, speech clear, no numbness, no tingling, no weakness.  Patient evaluated by Dr. Rider at time of triage, no stroke code initiated at this time.  STAT EKG performed.

## 2020-10-26 NOTE — ED ADULT NURSE REASSESSMENT NOTE - NS ED NURSE REASSESS COMMENT FT1
Patient assisted to bedpan, voided freely.  2 episodes of urinary incontinence noted.  Jessa care completed.  Primafit placed.  Meds and IV fluids infusing as ordered.

## 2020-10-26 NOTE — ED ADULT TRIAGE NOTE - CHIEF COMPLAINT QUOTE
Patient's daughter called ems because patient is "not acting right, shaky, c/o dull headache, dizziness when she woke up this morning."  Hx CVA per ems.  Last known normal unknown.

## 2020-10-26 NOTE — ED ADULT NURSE NOTE - NSIMPLEMENTINTERV_GEN_ALL_ED
Implemented All Fall Risk Interventions:  [place] to call system. Call bell, personal items and telephone within reach. Instruct patient to call for assistance. Room bathroom lighting operational. Non-slip footwear when patient is off stretcher. Physically safe environment: no spills, clutter or unnecessary equipment. Stretcher in lowest position, wheels locked, appropriate side rails in place. Provide visual cue, wrist band, yellow gown, etc. Monitor gait and stability. Monitor for mental status changes and reorient to person, place, and time. Review medications for side effects contributing to fall risk. Reinforce activity limits and safety measures with patient and family.

## 2020-10-26 NOTE — ED PROVIDER NOTE - PATIENT PORTAL LINK FT
You can access the FollowMyHealth Patient Portal offered by [facility] by registering at the following website: [email or web address]. By joining [employer]’s FollowMyHealth portal, you will also be able to view your health information using other applications (apps) compatible with our system.

## 2020-10-26 NOTE — ED PROVIDER NOTE - OBJECTIVE STATEMENT
79 y/o F PMHx CVA x3, HTN, HLD, asthma/COPD and hypothyroidism, went to bed without issue yesterday but today, she is feeling shaky, SOB (but not at all wheezy, not consistent w/ prior asthma). Daughter states headache and dizzy to ems but pt when she thinks about it states that she does not have a headache, and she is not dizzy, no room spinning, not feeling lightheaded. Does not feel like her speech is slow or confused. after much discussion, she thinks the only symptom she really has is "I feel like its just taking a lot to breathe".   lives w/ daughter, unsure how well daughter is adhering to social distancing, but pt denies any known exposures to covid-19.  pt notes that her ankles are mildly swollen for the past week - never had this before. denies hx of chf, and echo was done in past year and pt believes was normal

## 2020-10-26 NOTE — ED PROVIDER NOTE - PHYSICAL EXAMINATION
CONSTITUTIONAL: scared appearing older woman, frail and thin  HEAD: Normocephalic; atraumatic.   EYES:  conjunctiva and sclera clear, pupils equal  ENT: normal nose; no rhinorrhea; normal pharynx with no erythema or lesions.   NECK: Supple; non-tender;   CARDIOVASCULAR: Normal S1, S2; no murmurs, rubs, or gallops. Regular rate and rhythm.   RESPIRATORY: increased rate and effort, breath sounds clear and equal bilaterally; no wheezes, rhonchi, or rales.  GI: Soft; non-distended; non-tender; no palpable organomegaly.   EXT: No cyanosis, bilateral mild edema; N/V intact  SKIN: Normal for age and race; warm; dry; good turgor; no apparent lesions or rash.   NEURO: A & O x 3; face symmetric; motor/sensory intact, no pronator drift, gait deferred  PSYCHOLOGICAL: The patient’s mood and manner are appropriate.

## 2020-10-26 NOTE — ED PROVIDER NOTE - CLINICAL SUMMARY MEDICAL DECISION MAKING FREE TEXT BOX
here w/ very vague symptoms, just not feeling right. on exam I note that pt is tachypneic with clear lungs. here w/ very vague symptoms, just not feeling right. on exam I note that pt is tachypneic with clear lungs. will r/o pe given new edema though it is minimal and will check trop/bnp. pt admits she did nto take any of her home meds today, and is not sure what they are, but they do include meds for HTN. Asking for discharge home. states "I only came because my daughter insisted". here w/ very vague symptoms, just not feeling right. on exam I note that pt is tachypneic with clear lungs. will r/o pe given new edema though it is minimal and will check trop/bnp. pt admits she did nto take any of her home meds today, and is not sure what they are, but they do include meds for HTN. Asking for discharge home. states "I only came because my daughter insisted".      Patient well appearing, NAD, VSS. Will tx with oral abx to cover both UA and  poss  infectious vs inflamed pulmonary nodules.

## 2020-10-26 NOTE — ED PROVIDER NOTE - PMH
Asthma    Depression    GERD (gastroesophageal reflux disease)    High cholesterol    HTN (hypertension)    Hypothyroid    Primary biliary cirrhosis     Ketoconazole Counseling:   Patient counseled regarding improving absorption with orange juice.  Adverse effects include but are not limited to breast enlargement, headache, diarrhea, nausea, upset stomach, liver function test abnormalities, taste disturbance, and stomach pain.  There is a rare possibility of liver failure that can occur when taking ketoconazole. The patient understands that monitoring of LFTs may be required, especially at baseline. The patient verbalized understanding of the proper use and possible adverse effects of ketoconazole.  All of the patient's questions and concerns were addressed.

## 2020-10-26 NOTE — ED ADULT NURSE REASSESSMENT NOTE - NS ED NURSE REASSESS COMMENT FT1
Patient given discharge instructions.  [facility] present to pick patient up and transport home.  RN called daughter Zora at [phone] to inform.

## 2020-10-26 NOTE — ED PROVIDER NOTE - NSFOLLOWUPINSTRUCTIONS_ED_ALL_ED_FT
Please follow up with PMD for follow up with pulmonary nodules                    URINARY TRACT INFECTION IN OLDER ADULTS - Discharge Care           Urinary Tract Infection in Older Adults    WHAT YOU NEED TO KNOW:    A urinary tract infection (UTI) is caused by bacteria that get inside your urinary tract. Your urinary tract includes your kidneys, ureters, bladder, and urethra. Urine is made in your kidneys, and it flows from the ureters to the bladder. Urine leaves the bladder through the urethra. A UTI is more common in your lower urinary tract, which includes your bladder and urethra.    Kidney, Ureters, Bladder         DISCHARGE INSTRUCTIONS:    Seek care immediately if:   •You are urinating very little or not at all.      •You are vomiting.      •You have a high fever with shaking chills.      •You have side or back pain that gets worse.      Call your doctor if:   •You have a fever.      •You are a woman and you have increased white or yellow discharge from your vagina.      •You do not feel better after 2 days of taking antibiotics.      •You have questions or concerns about your condition or care.      Medicines:   •Medicines help treat the bacterial infection or decrease pain and burning when you urinate. You may also need medicines to decrease the urge to urinate often. If you have UTIs often (called recurrent UTIs), you may be given antibiotics to take regularly. You will be given directions for when and how to use antibiotics. The goal is to prevent UTIs but not cause antibiotic resistance by using antibiotics too often.      •Take your medicine as directed. Contact your healthcare provider if you think your medicine is not helping or if you have side effects. Tell him or her if you are allergic to any medicine. Keep a list of the medicines, vitamins, and herbs you take. Include the amounts, and when and why you take them. Bring the list or the pill bottles to follow-up visits. Carry your medicine list with you in case of an emergency.      Self-care:   •Drink liquids as directed. Liquids can help flush bacteria from your urinary tract. Ask how much liquid to drink each day and which liquids are best for you. You may need to drink more liquids than usual to help flush out the bacteria. Do not drink alcohol, caffeine, and citrus juices. These can irritate your bladder and increase your symptoms.      •Apply heat on your abdomen for 20 to 30 minutes every 2 hours for as many days as directed. Heat helps decrease discomfort and pressure in your bladder.      Prevent a UTI:   •Urinate when you feel the urge. Do not hold your urine. Bacteria can grow if urine stays in the bladder too long. It may be helpful to urinate at least every 3 to 4 hours.      •Urinate after you have sex to flush away bacteria that can enter your urinary tract during sex.      •Wear cotton underwear and clothes that are loose. Tight pants and nylon underwear can trap moisture and cause bacteria to grow.      •Cranberry juice or cranberry supplements may help prevent UTIs. Your healthcare provider can recommend the right juice or supplement for you.      •Women should wipe front to back after urinating or having a bowel movement. This may prevent germs from getting into the urinary tract. Do not douche or use feminine deodorants. These can change the chemical balance in your vagina. You may also be given vaginal estrogen medicine. This medicine helps prevent recurrent UTIs in women who have gone through menopause or are in jose luis-menopause.      Follow up with your healthcare provider as directed: Write down your questions so you remember to ask them during your visits.        © Copyright [employer] 2020           back to top                          © Copyright [employer] 2020

## 2020-10-26 NOTE — ED PROVIDER NOTE - SHIFT CHANGE DETAILS
80F c/o vague sx found to have uti on ua. s/p abx. sx improved.    dispo: pending CTA read, ivf, home anti-htn, anticipate dc home

## 2020-10-26 NOTE — ED ADULT NURSE NOTE - PMH
Asthma    Depression    GERD (gastroesophageal reflux disease)    High cholesterol    HTN (hypertension)    Hypothyroid    Primary biliary cirrhosis     none

## 2020-10-28 LAB
-  AMPICILLIN/SULBACTAM: SIGNIFICANT CHANGE UP
-  AMPICILLIN: SIGNIFICANT CHANGE UP
-  CEFAZOLIN: SIGNIFICANT CHANGE UP
-  CEFTRIAXONE: SIGNIFICANT CHANGE UP
-  CIPROFLOXACIN: SIGNIFICANT CHANGE UP
-  GENTAMICIN: SIGNIFICANT CHANGE UP
-  NITROFURANTOIN: SIGNIFICANT CHANGE UP
-  PIPERACILLIN/TAZOBACTAM: SIGNIFICANT CHANGE UP
-  TOBRAMYCIN: SIGNIFICANT CHANGE UP
-  TRIMETHOPRIM/SULFAMETHOXAZOLE: SIGNIFICANT CHANGE UP
CULTURE RESULTS: SIGNIFICANT CHANGE UP
METHOD TYPE: SIGNIFICANT CHANGE UP
ORGANISM # SPEC MICROSCOPIC CNT: SIGNIFICANT CHANGE UP
ORGANISM # SPEC MICROSCOPIC CNT: SIGNIFICANT CHANGE UP
SPECIMEN SOURCE: SIGNIFICANT CHANGE UP

## 2021-06-02 NOTE — ED ADULT NURSE NOTE - NSFALLRSKPASTHIST_ED_ALL_ED
I have not seen an MRI order regarding this patient.   
Received records from [facility] and placed them in your inbox by the  for review.      Patient is requesting an order for an MRI  
no

## 2021-09-11 ENCOUNTER — INPATIENT (INPATIENT)
Facility: HOSPITAL | Age: 81
LOS: 3 days | Discharge: NURSING, REHAB OR LONG-TERM CARE | DRG: 689 | End: 2021-09-15
Attending: STUDENT IN AN ORGANIZED HEALTH CARE EDUCATION/TRAINING PROGRAM | Admitting: STUDENT IN AN ORGANIZED HEALTH CARE EDUCATION/TRAINING PROGRAM
Payer: MEDICARE

## 2021-09-11 VITALS
HEIGHT: 56 IN | OXYGEN SATURATION: 97 % | HEART RATE: 87 BPM | RESPIRATION RATE: 18 BRPM | SYSTOLIC BLOOD PRESSURE: 128 MMHG | WEIGHT: 89.95 LBS | DIASTOLIC BLOOD PRESSURE: 73 MMHG | TEMPERATURE: 98 F

## 2021-09-11 DIAGNOSIS — Z98.49 CATARACT EXTRACTION STATUS, UNSPECIFIED EYE: Chronic | ICD-10-CM

## 2021-09-11 DIAGNOSIS — I10 ESSENTIAL (PRIMARY) HYPERTENSION: ICD-10-CM

## 2021-09-11 DIAGNOSIS — G93.40 ENCEPHALOPATHY, UNSPECIFIED: ICD-10-CM

## 2021-09-11 DIAGNOSIS — Z90.49 ACQUIRED ABSENCE OF OTHER SPECIFIED PARTS OF DIGESTIVE TRACT: Chronic | ICD-10-CM

## 2021-09-11 DIAGNOSIS — I63.9 CEREBRAL INFARCTION, UNSPECIFIED: ICD-10-CM

## 2021-09-11 DIAGNOSIS — Z98.890 OTHER SPECIFIED POSTPROCEDURAL STATES: Chronic | ICD-10-CM

## 2021-09-11 DIAGNOSIS — R62.7 ADULT FAILURE TO THRIVE: ICD-10-CM

## 2021-09-11 DIAGNOSIS — E78.5 HYPERLIPIDEMIA, UNSPECIFIED: ICD-10-CM

## 2021-09-11 DIAGNOSIS — N39.0 URINARY TRACT INFECTION, SITE NOT SPECIFIED: ICD-10-CM

## 2021-09-11 DIAGNOSIS — R63.8 OTHER SYMPTOMS AND SIGNS CONCERNING FOOD AND FLUID INTAKE: ICD-10-CM

## 2021-09-11 DIAGNOSIS — L98.429 NON-PRESSURE CHRONIC ULCER OF BACK WITH UNSPECIFIED SEVERITY: ICD-10-CM

## 2021-09-11 DIAGNOSIS — J44.9 CHRONIC OBSTRUCTIVE PULMONARY DISEASE, UNSPECIFIED: ICD-10-CM

## 2021-09-11 DIAGNOSIS — E03.9 HYPOTHYROIDISM, UNSPECIFIED: ICD-10-CM

## 2021-09-11 DIAGNOSIS — Z98.89 OTHER SPECIFIED POSTPROCEDURAL STATES: Chronic | ICD-10-CM

## 2021-09-11 LAB
ALBUMIN SERPL ELPH-MCNC: 3.4 G/DL — SIGNIFICANT CHANGE UP (ref 3.3–5)
ALP SERPL-CCNC: 54 U/L — SIGNIFICANT CHANGE UP (ref 40–120)
ALT FLD-CCNC: 21 U/L — SIGNIFICANT CHANGE UP (ref 10–45)
ANION GAP SERPL CALC-SCNC: 12 MMOL/L — SIGNIFICANT CHANGE UP (ref 5–17)
APPEARANCE UR: CLEAR — SIGNIFICANT CHANGE UP
APTT BLD: 26.5 SEC — LOW (ref 27.5–35.5)
AST SERPL-CCNC: 35 U/L — SIGNIFICANT CHANGE UP (ref 10–40)
BACTERIA # UR AUTO: ABNORMAL /HPF
BASE EXCESS BLDV CALC-SCNC: 0.9 MMOL/L — SIGNIFICANT CHANGE UP (ref -2–3)
BASOPHILS # BLD AUTO: 0.03 K/UL — SIGNIFICANT CHANGE UP (ref 0–0.2)
BASOPHILS NFR BLD AUTO: 0.2 % — SIGNIFICANT CHANGE UP (ref 0–2)
BILIRUB SERPL-MCNC: 0.7 MG/DL — SIGNIFICANT CHANGE UP (ref 0.2–1.2)
BILIRUB UR-MCNC: NEGATIVE — SIGNIFICANT CHANGE UP
BUN SERPL-MCNC: 47 MG/DL — HIGH (ref 7–23)
CA-I SERPL-SCNC: 1.28 MMOL/L — SIGNIFICANT CHANGE UP (ref 1.15–1.33)
CALCIUM SERPL-MCNC: 9.5 MG/DL — SIGNIFICANT CHANGE UP (ref 8.4–10.5)
CHLORIDE SERPL-SCNC: 101 MMOL/L — SIGNIFICANT CHANGE UP (ref 96–108)
CK SERPL-CCNC: 123 U/L — SIGNIFICANT CHANGE UP (ref 25–170)
CO2 BLDV-SCNC: 29.1 MMOL/L — HIGH (ref 22–26)
CO2 SERPL-SCNC: 25 MMOL/L — SIGNIFICANT CHANGE UP (ref 22–31)
COLOR SPEC: YELLOW — SIGNIFICANT CHANGE UP
COMMENT - URINE: SIGNIFICANT CHANGE UP
CREAT SERPL-MCNC: 0.77 MG/DL — SIGNIFICANT CHANGE UP (ref 0.5–1.3)
DIFF PNL FLD: ABNORMAL
EOSINOPHIL # BLD AUTO: 0.05 K/UL — SIGNIFICANT CHANGE UP (ref 0–0.5)
EOSINOPHIL NFR BLD AUTO: 0.4 % — SIGNIFICANT CHANGE UP (ref 0–6)
EPI CELLS # UR: SIGNIFICANT CHANGE UP /HPF (ref 0–5)
GAS PNL BLDV: 135 MMOL/L — LOW (ref 136–145)
GAS PNL BLDV: SIGNIFICANT CHANGE UP
GAS PNL BLDV: SIGNIFICANT CHANGE UP
GLUCOSE SERPL-MCNC: 132 MG/DL — HIGH (ref 70–99)
GLUCOSE UR QL: NEGATIVE — SIGNIFICANT CHANGE UP
HCO3 BLDV-SCNC: 28 MMOL/L — SIGNIFICANT CHANGE UP (ref 22–29)
HCT VFR BLD CALC: 34.1 % — LOW (ref 34.5–45)
HGB BLD-MCNC: 11.5 G/DL — SIGNIFICANT CHANGE UP (ref 11.5–15.5)
IMM GRANULOCYTES NFR BLD AUTO: 0.6 % — SIGNIFICANT CHANGE UP (ref 0–1.5)
INR BLD: 1.02 — SIGNIFICANT CHANGE UP (ref 0.88–1.16)
KETONES UR-MCNC: 15 MG/DL
LEUKOCYTE ESTERASE UR-ACNC: ABNORMAL
LYMPHOCYTES # BLD AUTO: 0.76 K/UL — LOW (ref 1–3.3)
LYMPHOCYTES # BLD AUTO: 5.9 % — LOW (ref 13–44)
MAGNESIUM SERPL-MCNC: 2 MG/DL — SIGNIFICANT CHANGE UP (ref 1.6–2.6)
MCHC RBC-ENTMCNC: 28.9 PG — SIGNIFICANT CHANGE UP (ref 27–34)
MCHC RBC-ENTMCNC: 33.7 GM/DL — SIGNIFICANT CHANGE UP (ref 32–36)
MCV RBC AUTO: 85.7 FL — SIGNIFICANT CHANGE UP (ref 80–100)
MONOCYTES # BLD AUTO: 1.13 K/UL — HIGH (ref 0–0.9)
MONOCYTES NFR BLD AUTO: 8.8 % — SIGNIFICANT CHANGE UP (ref 2–14)
NEUTROPHILS # BLD AUTO: 10.75 K/UL — HIGH (ref 1.8–7.4)
NEUTROPHILS NFR BLD AUTO: 84.1 % — HIGH (ref 43–77)
NITRITE UR-MCNC: POSITIVE
NRBC # BLD: 0 /100 WBCS — SIGNIFICANT CHANGE UP (ref 0–0)
PCO2 BLDV: 51 MMHG — HIGH (ref 39–42)
PH BLDV: 7.34 — SIGNIFICANT CHANGE UP (ref 7.32–7.43)
PH UR: 6 — SIGNIFICANT CHANGE UP (ref 5–8)
PLATELET # BLD AUTO: 309 K/UL — SIGNIFICANT CHANGE UP (ref 150–400)
PO2 BLDV: <35 MMHG — LOW (ref 25–45)
POTASSIUM BLDV-SCNC: 3.6 MMOL/L — SIGNIFICANT CHANGE UP (ref 3.5–5.1)
POTASSIUM SERPL-MCNC: 3.7 MMOL/L — SIGNIFICANT CHANGE UP (ref 3.5–5.3)
POTASSIUM SERPL-SCNC: 3.7 MMOL/L — SIGNIFICANT CHANGE UP (ref 3.5–5.3)
PROT SERPL-MCNC: 6.6 G/DL — SIGNIFICANT CHANGE UP (ref 6–8.3)
PROT UR-MCNC: 30 MG/DL
PROTHROM AB SERPL-ACNC: 12.2 SEC — SIGNIFICANT CHANGE UP (ref 10.6–13.6)
RBC # BLD: 3.98 M/UL — SIGNIFICANT CHANGE UP (ref 3.8–5.2)
RBC # FLD: 15.6 % — HIGH (ref 10.3–14.5)
RBC CASTS # UR COMP ASSIST: ABNORMAL /HPF
SAO2 % BLDV: 37.6 % — LOW (ref 67–88)
SARS-COV-2 RNA SPEC QL NAA+PROBE: SIGNIFICANT CHANGE UP
SODIUM SERPL-SCNC: 138 MMOL/L — SIGNIFICANT CHANGE UP (ref 135–145)
SP GR SPEC: 1.02 — SIGNIFICANT CHANGE UP (ref 1–1.03)
TROPONIN T SERPL-MCNC: 0.01 NG/ML — SIGNIFICANT CHANGE UP (ref 0–0.01)
UROBILINOGEN FLD QL: 0.2 E.U./DL — SIGNIFICANT CHANGE UP
WBC # BLD: 12.8 K/UL — HIGH (ref 3.8–10.5)
WBC # FLD AUTO: 12.8 K/UL — HIGH (ref 3.8–10.5)
WBC UR QL: ABNORMAL /HPF

## 2021-09-11 PROCEDURE — 99223 1ST HOSP IP/OBS HIGH 75: CPT | Mod: GC

## 2021-09-11 PROCEDURE — 71045 X-RAY EXAM CHEST 1 VIEW: CPT | Mod: 26

## 2021-09-11 PROCEDURE — G1004: CPT

## 2021-09-11 PROCEDURE — 93010 ELECTROCARDIOGRAM REPORT: CPT

## 2021-09-11 PROCEDURE — 70450 CT HEAD/BRAIN W/O DYE: CPT | Mod: 26,MG

## 2021-09-11 PROCEDURE — 99285 EMERGENCY DEPT VISIT HI MDM: CPT

## 2021-09-11 RX ORDER — ATORVASTATIN CALCIUM 80 MG/1
20 TABLET, FILM COATED ORAL AT BEDTIME
Refills: 0 | Status: DISCONTINUED | OUTPATIENT
Start: 2021-09-11 | End: 2021-09-15

## 2021-09-11 RX ORDER — IPRATROPIUM/ALBUTEROL SULFATE 18-103MCG
3 AEROSOL WITH ADAPTER (GRAM) INHALATION EVERY 6 HOURS
Refills: 0 | Status: DISCONTINUED | OUTPATIENT
Start: 2021-09-11 | End: 2021-09-15

## 2021-09-11 RX ORDER — LEVOTHYROXINE SODIUM 125 MCG
88 TABLET ORAL DAILY
Refills: 0 | Status: DISCONTINUED | OUTPATIENT
Start: 2021-09-12 | End: 2021-09-15

## 2021-09-11 RX ORDER — ACETAMINOPHEN 500 MG
600 TABLET ORAL ONCE
Refills: 0 | Status: COMPLETED | OUTPATIENT
Start: 2021-09-11 | End: 2021-09-11

## 2021-09-11 RX ORDER — CEFTRIAXONE 500 MG/1
1000 INJECTION, POWDER, FOR SOLUTION INTRAMUSCULAR; INTRAVENOUS ONCE
Refills: 0 | Status: COMPLETED | OUTPATIENT
Start: 2021-09-11 | End: 2021-09-11

## 2021-09-11 RX ORDER — ENOXAPARIN SODIUM 100 MG/ML
40 INJECTION SUBCUTANEOUS DAILY
Refills: 0 | Status: DISCONTINUED | OUTPATIENT
Start: 2021-09-11 | End: 2021-09-11

## 2021-09-11 RX ORDER — HEPARIN SODIUM 5000 [USP'U]/ML
5000 INJECTION INTRAVENOUS; SUBCUTANEOUS EVERY 12 HOURS
Refills: 0 | Status: DISCONTINUED | OUTPATIENT
Start: 2021-09-11 | End: 2021-09-15

## 2021-09-11 RX ORDER — CEFTRIAXONE 500 MG/1
1000 INJECTION, POWDER, FOR SOLUTION INTRAMUSCULAR; INTRAVENOUS EVERY 24 HOURS
Refills: 0 | Status: COMPLETED | OUTPATIENT
Start: 2021-09-12 | End: 2021-09-13

## 2021-09-11 RX ORDER — SODIUM CHLORIDE 9 MG/ML
1000 INJECTION INTRAMUSCULAR; INTRAVENOUS; SUBCUTANEOUS ONCE
Refills: 0 | Status: COMPLETED | OUTPATIENT
Start: 2021-09-11 | End: 2021-09-11

## 2021-09-11 RX ADMIN — CEFTRIAXONE 100 MILLIGRAM(S): 500 INJECTION, POWDER, FOR SOLUTION INTRAMUSCULAR; INTRAVENOUS at 16:05

## 2021-09-11 RX ADMIN — ATORVASTATIN CALCIUM 20 MILLIGRAM(S): 80 TABLET, FILM COATED ORAL at 23:53

## 2021-09-11 RX ADMIN — Medication 240 MILLIGRAM(S): at 23:53

## 2021-09-11 RX ADMIN — SODIUM CHLORIDE 1000 MILLILITER(S): 9 INJECTION INTRAMUSCULAR; INTRAVENOUS; SUBCUTANEOUS at 16:58

## 2021-09-11 NOTE — ED ADULT NURSE NOTE - ED CARDIAC RHYTHM
Spoke with patient and explained PCP message kate.    Meredith Ruiz, MSN, RN   [facility]     regular

## 2021-09-11 NOTE — ED ADULT TRIAGE NOTE - CHIEF COMPLAINT QUOTE
Pt BIBA from home for fatigue, weakness, light headedness starting yesterday afternoon. Granddaughter states she tried to walk her around but she was unable to due to lethargy. Per EMS BP 90/50s on arrival, 1L NS gvien with improvement, pt no longer feeling light headed. Pt poor historian, denies cp, sob, cough, fever. Endorses her tail bone hurting, grand daughter states she believes a sore is forming.

## 2021-09-11 NOTE — H&P ADULT - PROBLEM SELECTOR PLAN 7
Pt is unable elaborate further    TO DO:  - get med rec in AM for continuing medications Pt is unable elaborate further  started on atorvastatin 20 mg daily      TO DO:  - get med rec in AM for continuing medications Vitals stable with /74 and HR 79  Pt is unable elaborate further    TO DO:  - get med rec in AM for continuing medications

## 2021-09-11 NOTE — H&P ADULT - NSHPSOCIALHISTORY_GEN_ALL_CORE
Pt lives alone with some home help but unable to elaborate more. She walks around using a cane. She denies using alcohol or smoking.

## 2021-09-11 NOTE — ED PROVIDER NOTE - PROGRESS NOTE DETAILS
[place]:  WBC 12, BUN 47, other labs grossly wnl. CXR grossly unchanged from prior. UA+, will tx for UTI, additional IVF. CT pending, will reassess. Updated pt/granddaughter. Christafish: CTH w/ no acute pathology, will admit medicine for further care.

## 2021-09-11 NOTE — H&P ADULT - PROBLEM SELECTOR PLAN 4
Pt is unable elaborate further    TO DO:  - get med rec in AM for continuing medications Pt is unable elaborate further  started on duonebs prn     TO DO:  - get med rec in AM for continuing medications Pt c/o pain to her tailbone region. Large stage 2 sacral ulcer involving b/l buttocks.    TO DO:  - f/u wound care Pt c/o pain to her tailbone region. Large stage 2 sacral ulcer involving b/l buttocks that do not appear infected.     TO DO:  - f/u wound care

## 2021-09-11 NOTE — ED PROVIDER NOTE - TEMPLATE, MLM
Patient Education     Cough, Chronic, Uncertain Cause (Adult)    Everyone has had a cough as part of the common cold, flu, or bronchitis. This kind of cough occurs along with an achy feeling, low-grade fever, nasal and sinus congestion, and a scratchy or sore throat. This usually gets better in 2 to 3 weeks. A cough that lasts longer than 3 weeks may be due to other causes.  If your cough does not improve over the next 2 weeks, further testing may be needed. Follow up with your healthcare provider as advised. Cough suppressants may be recommended. Based on your exam today, the exact cause of your cough is not certain. Below are some common causes for persistent cough.  Smokers cough  “Smoker’s cough” doesn’t go away. If you continue to smoke, it only gets worse. The cough is from irritation in the air passages. Talk to your healthcare provider about quitting. Medicines or nicotine-replacement products, like gum or the patch, may make quitting easier.  Postnasal drip  A cough that is worse at night may be due to postnasal drip. Excess mucus in the nose drains from the back of your nose to your throat. This triggers the cough reflex. Postnasal drip may be due to a sinus infection or allergy. Common allergens include dust, tobacco smoke (both inhaled and secondhand smoke), environmental pollutants, pollen, mold, pets, cleaning agents, room deodorizers, and chemical fumes. Over-the-counter antihistamines or decongestants may be helpful for allergies. A sinus infection may requires antibiotic treatment. See your healthcare provider if symptoms continue.  Medicines  Certain prescribed medicines can cause a chronic cough in some people:  · ACE inhibitors for high blood pressure. These include benazepril, captopril, enalapril, fosinopril, lisinopril, quinapril, ramipril, and others.  · Beta-blockers for high blood pressure and other conditions. These include propranolol, atenolol, metoprolol, nadolol, and others.  Let your  healthcare provider know if you are taking any of these.  Asthma  Cough may be the only sign of mild asthma. You may have tests to find out if asthma is causing your cough. You may also take asthma medicine on a trial basis.  Acid reflux (heartburn, GERD)   The esophagus is the tube that carries food from the mouth to the stomach. A valve at its lower end prevents stomach acids from flowing upward. If this valve does not work properly, acid from the stomach enters the esophagus. This may cause a burning pain in the upper abdomen or lower chest, belching, or cough. Symptoms are often worse when lying flat. Avoid eating or drinking before bedtime. Try using extra pillows to raise your upper body, or place 4-inch blocks under the head of your bed. You may try an over-the-counter antacid or an acid-blocking medicine such as famotidine, cimetidine, ranitidine, esomeprazole, lansoprazole, or omeprazole. Stronger medicines for this condition can be prescribed by your healthcare provider.  Follow-up care  Follow up with your healthcare provider, or as advised, if your cough does not improve. Further testing may be needed.  (Note: If an X-ray was taken, a specialist will review it. You will be notified of any new findings that may affect your care.)  When to seek medical advice  Call your healthcare provider right away if any of these occur:  · Mild wheezing or difficulty breathing  · Fever of 100.4ºF (38ºC) or higher, or as directed by your healthcare provider  · Unexpected weight loss  · Coughing up large amounts of colored sputum  · Night sweats (sheets and pajamas get soaking wet)  Call 911, or get immediate medical care  Contact emergency services right away if any of these occur:  · Coughing up blood  · Moderate to severe trouble breathing or wheezing  © 2580-9164 [employer]. [address] [place] [address], [place] [address]. All rights reserved. This information is not intended as a substitute for professional  Fluid/Electrolyte/Metabolic medical care. Always follow your healthcare professional's instructions.

## 2021-09-11 NOTE — H&P ADULT - PROBLEM SELECTOR PLAN 3
Pt c/o pain to her tailbone region. Large stage 2 sacral ulcer involving b/l buttocks.    TO DO:  - f/u wound care Patient presented with generalized weakness and is extremely frail    TO DO:  - f/u PT recs  - f/u nutrition rec  - NPO until dysphagia screen and that can re start her diet

## 2021-09-11 NOTE — ED PROVIDER NOTE - OBJECTIVE STATEMENT
81F PMH CVA x3 (unclear residual), dementia,  HTN, HLD, asthma/COPD, hypothyroidism p/w AMS/weakness.   Per pt: C/o vague dizziness since yesterday. Also c/o pain to tail bone region. Unable to provide detailed hx but answers questions. Denies HA, f/c, URI symptoms, NVD, abd pain, urinary complaints.  Per granddaughter at bedside Francisco Huff [phone]: Up until ~1mo ago, pt was being taken care of by pt's daughter, however daughter  1mo ago. Francisco states that the daughter wouldn't allow anyone else in the family to visit the patient so its unclear what her usual baseline was. Since 1mo ago, pt lives alone but family members stop in. Family has been unable to find info on meds or who PMD is. Pt was able to ambulate w/ a cane but hasn't been able to ambulate for 3-4 days. Pt also developed sacral sore over last 2-3d. Pt also w/ diarrhea x4 today, watery. Pt also w/ increased confusion over last 2-3days - wouldn't answer questions that she is normally able to answer. Was A and O x3 1mo ago.   No reported falls. Not COVID vaccinated.

## 2021-09-11 NOTE — H&P ADULT - PROBLEM SELECTOR PLAN 8
Pt is unable elaborate further    TO DO:  - get med rec in AM for continuing medications Pt is unable elaborate further  started on atorvastatin 20 mg daily     TO DO:  - get med rec in AM for continuing medications Pt is unable elaborate further  started on atorvastatin 20 mg daily      TO DO:  - get med rec in AM for continuing medications

## 2021-09-11 NOTE — H&P ADULT - PROBLEM SELECTOR PLAN 2
Patient presented with generalized weakness and is extremely frail    TO DO:  - f/u PT recs  - f/u nutrition rec  - NPO until dysphagia screen and that can re start her diet Pt AAO x1 on presentation and appeared confused, however improved post IV NS and currently AAO x 3.     TO DO:  -f/u TSH, B12 and folate level

## 2021-09-11 NOTE — H&P ADULT - PROBLEM SELECTOR PLAN 9
F: none  E: replete prn   N: NPO until dysphagia screen   A: none  GI ppx  Dispo: F: none  E: replete prn   N: NPO until dysphagia screen   A: lovenox 40 mg SC  GI ppx none  Dispo: [facility] Pt is unable elaborate further  started on atorvastatin 20 mg daily     TO DO:  - get med rec in AM for continuing medications

## 2021-09-11 NOTE — H&P ADULT - PROBLEM SELECTOR PLAN 5
Pt is unable elaborate further    TO DO:  - get med rec in AM for continuing medications Pt is unable elaborate further  started on synthroid 88 mg daily    TO DO:  - get med rec in AM for continuing medications Pt is unable elaborate further  started on duonebs prn     TO DO:  - get med rec in AM for continuing medications

## 2021-09-11 NOTE — ED ADULT NURSE NOTE - OBJECTIVE STATEMENT
81y F, A&ox2, person and place, disoriented to time, poor historian, presents to ed with grand daughter for progressively declining mentation and weakness x1 month. Began after pt daughter passed away. Reports recent past few days pt declining in mentation and unable to walk. PT endorses decreased po intake with pain to sacrum. [facility] pt endorses decreased intake and diarrhea x3 episodes x2 days. PEr ems pt was hypotensive and received NS, improvement in VS and s/s.

## 2021-09-11 NOTE — H&P ADULT - PROBLEM SELECTOR PLAN 6
Vitals stable with /74 and HR 79  Pt is unable elaborate further    TO DO:  - get med rec in AM for continuing medications Pt is unable elaborate further  started on synthroid 88 mg daily    TO DO:  - get med rec in AM for continuing medications

## 2021-09-11 NOTE — ED ADULT NURSE NOTE - NSIMPLEMENTINTERV_GEN_ALL_ED
Implemented All Fall with Harm Risk Interventions:  [place] to call system. Call bell, personal items and telephone within reach. Instruct patient to call for assistance. Room bathroom lighting operational. Non-slip footwear when patient is off stretcher. Physically safe environment: no spills, clutter or unnecessary equipment. Stretcher in lowest position, wheels locked, appropriate side rails in place. Provide visual cue, wrist band, yellow gown, etc. Monitor gait and stability. Monitor for mental status changes and reorient to person, place, and time. Review medications for side effects contributing to fall risk. Reinforce activity limits and safety measures with patient and family. Provide visual clues: red socks.

## 2021-09-11 NOTE — H&P ADULT - NSHPLABSRESULTS_GEN_ALL_CORE
.  LABS:                         11.5   12.80 )-----------( 309      ( 11 Sep 2021 15:44 )             34.1         138  |  101  |  47<H>  ----------------------------<  132<H>  3.7   |  25  |  0.77    Ca    9.5      11 Sep 2021 15:44  Mg     2.0         TPro  6.6  /  Alb  3.4  /  TBili  0.7  /  DBili  x   /  AST  35  /  ALT  21  /  AlkPhos  54  -11    PT/INR - ( 11 Sep 2021 15:44 )   PT: 12.2 sec;   INR: 1.02          PTT - ( 11 Sep 2021 15:44 )  PTT:26.5 sec  Urinalysis Basic - ( 11 Sep 2021 15:44 )    Color: Yellow / Appearance: Clear / S.020 / pH: x  Gluc: x / Ketone: 15 mg/dL  / Bili: Negative / Urobili: 0.2 E.U./dL   Blood: x / Protein: 30 mg/dL / Nitrite: POSITIVE   Leuk Esterase: Moderate / RBC: 5-10 /HPF / WBC Many /HPF   Sq Epi: x / Non Sq Epi: 0-5 /HPF / Bacteria: Many /HPF      CARDIAC MARKERS ( 11 Sep 2021 15:44 )  x     / 0.01 ng/mL / 123 U/L / x     / x                RADIOLOGY, EKG & ADDITIONAL TESTS: CT head: No acute intracranial hemorrhage or discernible mass.

## 2021-09-11 NOTE — ED PROVIDER NOTE - PHYSICAL EXAMINATION
BRENDA mendoza chaperone:   Large stage 2 sacral ulcer involving b/l buttocks. rectal temp 99.2, no BRBPR or black stool   moving all extremities but generalized weakness 2/5. slightly mottled b/l hands and feet. normal radial/ulnar pulses, unable to palpate b/l DP pulses, minimal coolness to toes, otherwise normal skin temp. long unhygienic nails. no spinal ttp.   A and O to person, "hospital, recognizes granddaughter. Does not know year/president.

## 2021-09-11 NOTE — H&P ADULT - ASSESSMENT
jagruti Razo is 80 yo F with PHM CVA x3 (unclear residual), dementia,  HTN, HLD, asthma/COPD, hypothyroidism presented with 1-2 days of dizziness and is admitted for management of failure to thrive and UTI

## 2021-09-11 NOTE — H&P ADULT - ATTENDING COMMENTS
82 yo F with PHM CVA x3 (unclear residual), dementia,  HTN, HLD, asthma/COPD, hypothyroidism presented with 1-2 days of dizziness and is admitted for management of failure to thrive and UTI     #ME: Presents w/ AMS from baseline per family and symptoms of dizziness- Improved s/p fluid resuscitation and UTI management. CTH wnl. No focal neuro deficit on exam, denies tinnitus/hearing loss. f/up orthostatics, f/up tsh; ishaan consult in AM for symptoms of depression, no SI.    #FTT: plan as above, f/up tsh, b12,folate, nutrition/SW/PT consult  #UTI: c/w ceft, f/up urine cx.

## 2021-09-11 NOTE — ED ADULT NURSE NOTE - NSICDXPASTSURGICALHX_GEN_ALL_CORE_FT
PAST SURGICAL HISTORY:  H/O knee surgery     History of cholecystectomy     History of Nissen fundoplication     S/P cataract surgery

## 2021-09-11 NOTE — H&P ADULT - HISTORY OF PRESENT ILLNESS
Ms Dung is 80 yo F with PHM CVA x3 (unclear residual), dementia,  HTN, HLD, asthma/COPD, hypothyroidism presented with 1-2 days of dizziness. She states that for about 1-2 days she has been weak and had some dizziness but never lost consciousness, never had bladder incontinence or involuntary body movements. She got extremely emotional mentioning that her daughter  1 month ago but she has some help although did not elaborate on the type of help. She states that she gets around with her ADL using a cane and that she has been eating and drinking on time. She is AAO x3, but takes long time to recall and respond to any question asked. She also c/o pain to tailbone region. She denies headache, vision disturbance, chest pain, SOB, cough, abdominal pain, diarrhea, dysuria, numbness or weakness.        Of note, Per ED granddaughter Francisco Huff [phone] stated that Up until ~1mo ago, pt was being taken care of by pt's daughter, however daughter  1mo ago. Francisco states that the daughter wouldn't allow anyone else in the family to visit the patient so its unclear what her usual baseline was. Since 1mo ago, pt lives alone but family members stop in. Family has been unable to find info on meds or who PMD is. They also mentions that patient has increased confusion over last 2-3days - wouldn't answer questions that she is normally able to answer and was A and O x3 1mo ago.    In the ED:  Vitals: T 97.6, HR 79, /74, RR 18, SpO2 99  Labs: Hb 11.5, WBC 12.80, Plt 309, Na 138, K 3.7, Chloride 101, BUN 97, Cr 0.77, Glucose 132  UA: Positive for nitrite, WBC and bacteria  CT head: No acute intracranial hemorrhage or discernible mass.  Stable chronic microvascular ischemic changes.  Meds: IV NS and Ceftriaxone     Patient is admitted for management of failure to thrive and UTI Ms Dung is 82 yo F with PHM CVA x3 (unclear residual), dementia,  HTN, HLD, asthma/COPD, hypothyroidism presented with 1-2 days h/o of dizziness. She states that for about last 1-2 days she has been weak and had some dizziness but never lost consciousness, never had bladder incontinence or involuntary body movements. She got extremely emotional mentioning that her daughter  1 month ago but she has some help although did not elaborate on the type of help. She states that she gets around with her ADL using a cane and that she has been eating and drinking on time. She is AAO x3, but takes long time to recall and respond to any question asked. She also c/o pain to tailbone region. She denies headache, vision disturbance, chest pain, SOB, cough, abdominal pain, diarrhea, dysuria, numbness or weakness.        Of note, Per ED granddaughter Francisco Huff [phone] stated that Up until ~1mo ago, pt was being taken care of by pt's daughter, however daughter  1mo ago. Francisco states that the daughter wouldn't allow anyone else in the family to visit the patient so its unclear what her usual baseline was. Since 1mo ago, pt lives alone but family members stop in. Family has been unable to find info on meds or who PMD is. They also mentions that patient has increased confusion over last 2-3days - wouldn't answer questions that she is normally able to answer and was A and O x3 1mo ago.    In the ED:  Vitals: T 97.6, HR 79, /74, RR 18, SpO2 99  Labs: Hb 11.5, WBC 12.80, Plt 309, Na 138, K 3.7, Chloride 101, BUN 97, Cr 0.77, Glucose 132  UA: Positive for nitrite, WBC and bacteria  CT head: No acute intracranial hemorrhage or discernible mass.  Stable chronic microvascular ischemic changes.  Meds: IV NS and Ceftriaxone     Patient is admitted for management of failure to thrive and UTI

## 2021-09-11 NOTE — H&P ADULT - PROBLEM SELECTOR PLAN 10
F: none  E: replete prn   N: NPO until dysphagia screen   A: lovenox 40 mg SC  GI ppx none  Dispo: [facility]

## 2021-09-11 NOTE — H&P ADULT - NSHPPHYSICALEXAM_GEN_ALL_CORE
PHYSICAL EXAM: Pt was examined at bedside. She was laying comfortably and did not appear in acute distress.  Constitutional: cathexic, frail      HEENT: AC/NT, non icteric sclera, MMM  Neck: supple, no JVD  Respiratory: CTA b/l, no W/R/R  Cardiovascular: S1, S2 heard, no murmurs  Gastrointestinal: soft, non tender, ND, BS present x4, no supra pubic tenderness  Extremities: pulses present b/l, no edema, no cyanosis, no clubbing  Neurological: AAO x3, no focal neurological deficits  Skin: Large stage 2 sacral ulcer involving b/l buttocks.
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
## 2021-09-11 NOTE — ED ADULT NURSE NOTE - NSICDXPASTMEDICALHX_GEN_ALL_CORE_FT
PAST MEDICAL HISTORY:  Asthma     Dementia     Depression     GERD (gastroesophageal reflux disease)     High cholesterol     HTN (hypertension)     Hypothyroid     Primary biliary cirrhosis

## 2021-09-11 NOTE — H&P ADULT - PROBLEM SELECTOR PLAN 1
Pt presented with 1-2 days h/o dizziness and weakness. As per her daughter she appears more confused but on examination she was AAO x 3  UA: Positive for nitrite, WBC and bacteria and WBC 12.80    TO DO:  - cw ceftriaxone 1gm IV X 3 doses total

## 2021-09-11 NOTE — ED ADULT TRIAGE NOTE - BP NONINVASIVE DIASTOLIC (MM HG)
1. What PRN did patient receive? Anti-Psychotic (Zyprexa/Thorazine/Haldol/Risperdal/Seroquel/Abilify)    2. What was the patient doing that led to the PRN medication? Agitation    3. Did they require R/S? NO    4. Side effects to PRN medication? None    5. After 1 Hour, patient appeared: Other continues to get frustrated easily, appears angry often.        73

## 2021-09-12 LAB
ANION GAP SERPL CALC-SCNC: 8 MMOL/L — SIGNIFICANT CHANGE UP (ref 5–17)
BASOPHILS # BLD AUTO: 0.03 K/UL — SIGNIFICANT CHANGE UP (ref 0–0.2)
BASOPHILS NFR BLD AUTO: 0.4 % — SIGNIFICANT CHANGE UP (ref 0–2)
BUN SERPL-MCNC: 32 MG/DL — HIGH (ref 7–23)
CALCIUM SERPL-MCNC: 9 MG/DL — SIGNIFICANT CHANGE UP (ref 8.4–10.5)
CHLORIDE SERPL-SCNC: 104 MMOL/L — SIGNIFICANT CHANGE UP (ref 96–108)
CO2 SERPL-SCNC: 23 MMOL/L — SIGNIFICANT CHANGE UP (ref 22–31)
COVID-19 SPIKE DOMAIN AB INTERP: NEGATIVE — SIGNIFICANT CHANGE UP
COVID-19 SPIKE DOMAIN ANTIBODY RESULT: 0.4 U/ML — SIGNIFICANT CHANGE UP
CREAT SERPL-MCNC: 0.68 MG/DL — SIGNIFICANT CHANGE UP (ref 0.5–1.3)
EOSINOPHIL # BLD AUTO: 0.24 K/UL — SIGNIFICANT CHANGE UP (ref 0–0.5)
EOSINOPHIL NFR BLD AUTO: 3.1 % — SIGNIFICANT CHANGE UP (ref 0–6)
FOLATE SERPL-MCNC: >20 NG/ML — SIGNIFICANT CHANGE UP
GLUCOSE SERPL-MCNC: 88 MG/DL — SIGNIFICANT CHANGE UP (ref 70–99)
HCT VFR BLD CALC: 30.8 % — LOW (ref 34.5–45)
HGB BLD-MCNC: 10.1 G/DL — LOW (ref 11.5–15.5)
IMM GRANULOCYTES NFR BLD AUTO: 0.8 % — SIGNIFICANT CHANGE UP (ref 0–1.5)
LYMPHOCYTES # BLD AUTO: 0.67 K/UL — LOW (ref 1–3.3)
LYMPHOCYTES # BLD AUTO: 8.6 % — LOW (ref 13–44)
MAGNESIUM SERPL-MCNC: 1.7 MG/DL — SIGNIFICANT CHANGE UP (ref 1.6–2.6)
MCHC RBC-ENTMCNC: 28.7 PG — SIGNIFICANT CHANGE UP (ref 27–34)
MCHC RBC-ENTMCNC: 32.8 GM/DL — SIGNIFICANT CHANGE UP (ref 32–36)
MCV RBC AUTO: 87.5 FL — SIGNIFICANT CHANGE UP (ref 80–100)
MONOCYTES # BLD AUTO: 0.78 K/UL — SIGNIFICANT CHANGE UP (ref 0–0.9)
MONOCYTES NFR BLD AUTO: 10.1 % — SIGNIFICANT CHANGE UP (ref 2–14)
NEUTROPHILS # BLD AUTO: 5.98 K/UL — SIGNIFICANT CHANGE UP (ref 1.8–7.4)
NEUTROPHILS NFR BLD AUTO: 77 % — SIGNIFICANT CHANGE UP (ref 43–77)
NRBC # BLD: 0 /100 WBCS — SIGNIFICANT CHANGE UP (ref 0–0)
PHOSPHATE SERPL-MCNC: 1.8 MG/DL — LOW (ref 2.5–4.5)
PLATELET # BLD AUTO: 244 K/UL — SIGNIFICANT CHANGE UP (ref 150–400)
POTASSIUM SERPL-MCNC: 3.1 MMOL/L — LOW (ref 3.5–5.3)
POTASSIUM SERPL-SCNC: 3.1 MMOL/L — LOW (ref 3.5–5.3)
RBC # BLD: 3.52 M/UL — LOW (ref 3.8–5.2)
RBC # FLD: 15.9 % — HIGH (ref 10.3–14.5)
SARS-COV-2 IGG+IGM SERPL QL IA: 0.4 U/ML — SIGNIFICANT CHANGE UP
SARS-COV-2 IGG+IGM SERPL QL IA: NEGATIVE — SIGNIFICANT CHANGE UP
SODIUM SERPL-SCNC: 135 MMOL/L — SIGNIFICANT CHANGE UP (ref 135–145)
TSH SERPL-MCNC: 3.31 UIU/ML — SIGNIFICANT CHANGE UP (ref 0.27–4.2)
VIT B12 SERPL-MCNC: 757 PG/ML — SIGNIFICANT CHANGE UP (ref 232–1245)
WBC # BLD: 7.76 K/UL — SIGNIFICANT CHANGE UP (ref 3.8–10.5)
WBC # FLD AUTO: 7.76 K/UL — SIGNIFICANT CHANGE UP (ref 3.8–10.5)

## 2021-09-12 PROCEDURE — 99233 SBSQ HOSP IP/OBS HIGH 50: CPT | Mod: GC

## 2021-09-12 RX ORDER — POTASSIUM CHLORIDE 20 MEQ
40 PACKET (EA) ORAL EVERY 12 HOURS
Refills: 0 | Status: COMPLETED | OUTPATIENT
Start: 2021-09-12 | End: 2021-09-12

## 2021-09-12 RX ORDER — MONTELUKAST 4 MG/1
10 TABLET, CHEWABLE ORAL AT BEDTIME
Refills: 0 | Status: DISCONTINUED | OUTPATIENT
Start: 2021-09-12 | End: 2021-09-15

## 2021-09-12 RX ORDER — ACETAMINOPHEN 500 MG
650 TABLET ORAL EVERY 6 HOURS
Refills: 0 | Status: DISCONTINUED | OUTPATIENT
Start: 2021-09-12 | End: 2021-09-15

## 2021-09-12 RX ORDER — URSODIOL 250 MG/1
500 TABLET, FILM COATED ORAL EVERY 12 HOURS
Refills: 0 | Status: DISCONTINUED | OUTPATIENT
Start: 2021-09-12 | End: 2021-09-12

## 2021-09-12 RX ORDER — MAGNESIUM SULFATE 500 MG/ML
2 VIAL (ML) INJECTION ONCE
Refills: 0 | Status: COMPLETED | OUTPATIENT
Start: 2021-09-12 | End: 2021-09-12

## 2021-09-12 RX ORDER — URSODIOL 250 MG/1
500 TABLET, FILM COATED ORAL EVERY 12 HOURS
Refills: 0 | Status: DISCONTINUED | OUTPATIENT
Start: 2021-09-12 | End: 2021-09-15

## 2021-09-12 RX ORDER — POTASSIUM PHOSPHATE, MONOBASIC POTASSIUM PHOSPHATE, DIBASIC 236; 224 MG/ML; MG/ML
30 INJECTION, SOLUTION INTRAVENOUS ONCE
Refills: 0 | Status: COMPLETED | OUTPATIENT
Start: 2021-09-12 | End: 2021-09-12

## 2021-09-12 RX ADMIN — HEPARIN SODIUM 5000 UNIT(S): 5000 INJECTION INTRAVENOUS; SUBCUTANEOUS at 18:39

## 2021-09-12 RX ADMIN — Medication 40 MILLIEQUIVALENT(S): at 18:39

## 2021-09-12 RX ADMIN — Medication 650 MILLIGRAM(S): at 12:18

## 2021-09-12 RX ADMIN — Medication 650 MILLIGRAM(S): at 13:18

## 2021-09-12 RX ADMIN — URSODIOL 500 MILLIGRAM(S): 250 TABLET, FILM COATED ORAL at 18:55

## 2021-09-12 RX ADMIN — CEFTRIAXONE 100 MILLIGRAM(S): 500 INJECTION, POWDER, FOR SOLUTION INTRAMUSCULAR; INTRAVENOUS at 16:24

## 2021-09-12 RX ADMIN — POTASSIUM PHOSPHATE, MONOBASIC POTASSIUM PHOSPHATE, DIBASIC 83.33 MILLIMOLE(S): 236; 224 INJECTION, SOLUTION INTRAVENOUS at 12:19

## 2021-09-12 RX ADMIN — ATORVASTATIN CALCIUM 20 MILLIGRAM(S): 80 TABLET, FILM COATED ORAL at 21:59

## 2021-09-12 RX ADMIN — Medication 88 MICROGRAM(S): at 06:24

## 2021-09-12 RX ADMIN — MONTELUKAST 10 MILLIGRAM(S): 4 TABLET, CHEWABLE ORAL at 22:00

## 2021-09-12 RX ADMIN — HEPARIN SODIUM 5000 UNIT(S): 5000 INJECTION INTRAVENOUS; SUBCUTANEOUS at 06:24

## 2021-09-12 RX ADMIN — Medication 40 MILLIEQUIVALENT(S): at 09:14

## 2021-09-12 RX ADMIN — Medication 50 GRAM(S): at 09:15

## 2021-09-12 NOTE — DIETITIAN INITIAL EVALUATION ADULT. - PROBLEM SELECTOR PLAN 3
Patient presented with generalized weakness and is extremely frail    TO DO:  - f/u PT recs  - f/u nutrition rec  - NPO until dysphagia screen and that can re start her diet

## 2021-09-12 NOTE — DIETITIAN INITIAL EVALUATION ADULT. - PROBLEM SELECTOR PLAN 7
Vitals stable with /74 and HR 79  Pt is unable elaborate further    TO DO:  - get med rec in AM for continuing medications

## 2021-09-12 NOTE — DIETITIAN INITIAL EVALUATION ADULT. - PROBLEM SELECTOR PLAN 9
Pt is unable elaborate further  started on atorvastatin 20 mg daily     TO DO:  - get med rec in AM for continuing medications

## 2021-09-12 NOTE — DIETITIAN INITIAL EVALUATION ADULT. - ADD RECOMMEND
1. Consider addition of appetite stimulate if po intake consistently <25% of meals (Remeron?)  2. Bowel regimen PRN

## 2021-09-12 NOTE — DIETITIAN INITIAL EVALUATION ADULT. - PROBLEM SELECTOR PLAN 2
Pt AAO x1 on presentation and appeared confused, however improved post IV NS and currently AAO x 3.     TO DO:  -f/u TSH, B12 and folate level

## 2021-09-12 NOTE — DIETITIAN INITIAL EVALUATION ADULT. - PROBLEM SELECTOR PLAN 4
Pt c/o pain to her tailbone region. Large stage 2 sacral ulcer involving b/l buttocks that do not appear infected.     TO DO:  - f/u wound care

## 2021-09-12 NOTE — DIETITIAN INITIAL EVALUATION ADULT. - PROBLEM SELECTOR PLAN 6
Pt is unable elaborate further  started on synthroid 88 mg daily    TO DO:  - get med rec in AM for continuing medications

## 2021-09-12 NOTE — DIETITIAN INITIAL EVALUATION ADULT. - REASON
Unable to complete d/t pt tearful. Per limited visual exam, noted moderate/severe muscle wasting in upper body and moderate fat loss in upper body

## 2021-09-12 NOTE — DIETITIAN INITIAL EVALUATION ADULT. - ORAL INTAKE PTA/DIET HISTORY
Spoke with pt in room this morning. States since daughters passing 1 month ago appetite has significantly decreased and no desires to eat. States "I eat enough to make it." Pt tearful about daughter and unable to collect any further diet/ wt hx from pt. No cultural, ethnic, Lutheran food preferences noted. Allergies confirmed per EMR.

## 2021-09-12 NOTE — PROGRESS NOTE ADULT - SUBJECTIVE AND OBJECTIVE BOX
INTERNAL MEDICINE PROGRESS NOTE    INTERVAL HPI/OVERNIGHT EVENTS:  Patient seen and examined at bedside. Patient does not remember why she came into the hospital. She states she is sad that her daughter recently  but otherwise has no complaints. She denies fever, chills, lightheadedness, CP, palpitations, SOB, cough, N/V/D/C, abdominal pain, dysuria, LE edema.     VITAL SIGNS:  T(F): 98.3 (21 @ 08:54)  HR: 66 (21 @ 08:54)  BP: 127/70 (21 @ 08:54)  RR: 16 (21 @ 08:54)  SpO2: 97% (21 @ 08:54)  Wt(kg): --    PHYSICAL EXAM:  Constitutional: Cachectic, NAD, comfortable in bed.  HEENT: NC/AT, PERRLA, MMM  Neck: Supple, no JVD  Respiratory: Normal rate, rhythm, depth, effort. CTAB. No w/r/r.   Cardiovascular: RRR, normal S1 and S2, no m/r/g.   Gastrointestinal: +BS, soft NTND, no guarding or rebound tenderness\  Extremities: wwp; no cyanosis, clubbing or edema.   Vascular: Pulses equal and strong throughout.   Neurological: AAOx2, no CN deficits, MACIAS.   Skin: Large stage 2 sacral ulcer        MEDICATIONS  (STANDING):  atorvastatin 20 milliGRAM(s) Oral at bedtime  cefTRIAXone   IVPB 1000 milliGRAM(s) IV Intermittent every 24 hours  heparin   Injectable 5000 Unit(s) SubCutaneous every 12 hours  levothyroxine 88 MICROGram(s) Oral daily  montelukast 10 milliGRAM(s) Oral at bedtime  potassium chloride   Powder 40 milliEquivalent(s) Oral every 12 hours  ursodiol Tablet 500 milliGRAM(s) Oral every 12 hours    MEDICATIONS  (PRN):  acetaminophen   Tablet .. 650 milliGRAM(s) Oral every 6 hours PRN Mild Pain (1 - 3), Moderate Pain (4 - 6)  albuterol/ipratropium for Nebulization 3 milliLiter(s) Nebulizer every 6 hours PRN Shortness of Breath and/or Wheezing      Allergies    Kiwi (Other)  No Known Drug Allergies  Nuts (Rash)  pitted fruits (Hives)  tree fruit (Unknown)  Tree Nuts (Unknown)    Intolerances        LABS:                        10.1   7.76  )-----------( 244      ( 12 Sep 2021 07:47 )             30.8     09-12    135  |  104  |  32<H>  ----------------------------<  88  3.1<L>   |  23  |  0.68    Ca    9.0      12 Sep 2021 07:47  Phos  1.8       Mg     1.7         TPro  6.6  /  Alb  3.4  /  TBili  0.7  /  DBili  x   /  AST  35  /  ALT  21  /  AlkPhos  54  09-11    PT/INR - ( 11 Sep 2021 15:44 )   PT: 12.2 sec;   INR: 1.02          PTT - ( 11 Sep 2021 15:44 )  PTT:26.5 sec  Urinalysis Basic - ( 11 Sep 2021 15:44 )    Color: Yellow / Appearance: Clear / S.020 / pH: x  Gluc: x / Ketone: 15 mg/dL  / Bili: Negative / Urobili: 0.2 E.U./dL   Blood: x / Protein: 30 mg/dL / Nitrite: POSITIVE   Leuk Esterase: Moderate / RBC: 5-10 /HPF / WBC Many /HPF   Sq Epi: x / Non Sq Epi: 0-5 /HPF / Bacteria: Many /HPF        RADIOLOGY & ADDITIONAL TESTS: Reviewed

## 2021-09-12 NOTE — DIETITIAN INITIAL EVALUATION ADULT. - PROBLEM SELECTOR PLAN 5
Pt is unable elaborate further  started on duonebs prn     TO DO:  - get med rec in AM for continuing medications

## 2021-09-12 NOTE — PROGRESS NOTE ADULT - PROBLEM SELECTOR PLAN 1
Pt presented with 1-2 days h/o dizziness and weakness. UA: Positive for nitrite, WBC and bacteria and WBC 12.80.  - f/u urine culture  - cw ceftriaxone 1gm IV X 3 doses total

## 2021-09-12 NOTE — DIETITIAN INITIAL EVALUATION ADULT. - MALNUTRITION
Severe malnutrition in the context of acute illness AEB </50% of estimated energy requirement for >/= 5 days, moderate muscle wasting and fat loss in upper body

## 2021-09-12 NOTE — PROGRESS NOTE ADULT - PROBLEM SELECTOR PLAN 2
Pt AAO x1 on presentation and appeared confused, however improved post IV NS. In AM patient again altered. May be related to UTI vs symptoms of depression in setting of recent loss of her daughter   -TSH wnl  - f/u B12 and folate level Pt AAO x1 on presentation and appeared confused, however improved post IV NS. In AM patient again altered. May be related to UTI vs symptoms of depression in setting of recent loss of her daughter   -TSH wnl  - f/u B12 and folate level  -psych consult for depression

## 2021-09-12 NOTE — DIETITIAN INITIAL EVALUATION ADULT. - PROBLEM SELECTOR PLAN 10
F: none  E: replete prn   N: NPO until dysphagia screen   A: lovenox 40 mg SC  GI ppx none  Dispo: [facility]

## 2021-09-13 LAB
-  AMPICILLIN/SULBACTAM: SIGNIFICANT CHANGE UP
-  AMPICILLIN: SIGNIFICANT CHANGE UP
-  CEFAZOLIN: SIGNIFICANT CHANGE UP
-  CEFTRIAXONE: SIGNIFICANT CHANGE UP
-  CIPROFLOXACIN: SIGNIFICANT CHANGE UP
-  ERTAPENEM: SIGNIFICANT CHANGE UP
-  GENTAMICIN: SIGNIFICANT CHANGE UP
-  NITROFURANTOIN: SIGNIFICANT CHANGE UP
-  PIPERACILLIN/TAZOBACTAM: SIGNIFICANT CHANGE UP
-  TOBRAMYCIN: SIGNIFICANT CHANGE UP
-  TRIMETHOPRIM/SULFAMETHOXAZOLE: SIGNIFICANT CHANGE UP
ANION GAP SERPL CALC-SCNC: 8 MMOL/L — SIGNIFICANT CHANGE UP (ref 5–17)
BUN SERPL-MCNC: 19 MG/DL — SIGNIFICANT CHANGE UP (ref 7–23)
CALCIUM SERPL-MCNC: 8.7 MG/DL — SIGNIFICANT CHANGE UP (ref 8.4–10.5)
CHLORIDE SERPL-SCNC: 108 MMOL/L — SIGNIFICANT CHANGE UP (ref 96–108)
CO2 SERPL-SCNC: 19 MMOL/L — LOW (ref 22–31)
CREAT SERPL-MCNC: 0.57 MG/DL — SIGNIFICANT CHANGE UP (ref 0.5–1.3)
CULTURE RESULTS: SIGNIFICANT CHANGE UP
FERRITIN SERPL-MCNC: 122 NG/ML — SIGNIFICANT CHANGE UP (ref 15–150)
GLUCOSE SERPL-MCNC: 83 MG/DL — SIGNIFICANT CHANGE UP (ref 70–99)
HCT VFR BLD CALC: 30.6 % — LOW (ref 34.5–45)
HGB BLD-MCNC: 10.2 G/DL — LOW (ref 11.5–15.5)
IRON SATN MFR SERPL: 25 % — SIGNIFICANT CHANGE UP (ref 14–50)
IRON SATN MFR SERPL: 43 UG/DL — SIGNIFICANT CHANGE UP (ref 30–160)
MAGNESIUM SERPL-MCNC: 2.1 MG/DL — SIGNIFICANT CHANGE UP (ref 1.6–2.6)
MCHC RBC-ENTMCNC: 29.1 PG — SIGNIFICANT CHANGE UP (ref 27–34)
MCHC RBC-ENTMCNC: 33.3 GM/DL — SIGNIFICANT CHANGE UP (ref 32–36)
MCV RBC AUTO: 87.4 FL — SIGNIFICANT CHANGE UP (ref 80–100)
METHOD TYPE: SIGNIFICANT CHANGE UP
NRBC # BLD: 0 /100 WBCS — SIGNIFICANT CHANGE UP (ref 0–0)
ORGANISM # SPEC MICROSCOPIC CNT: SIGNIFICANT CHANGE UP
ORGANISM # SPEC MICROSCOPIC CNT: SIGNIFICANT CHANGE UP
PHOSPHATE SERPL-MCNC: 2.6 MG/DL — SIGNIFICANT CHANGE UP (ref 2.5–4.5)
PLATELET # BLD AUTO: 268 K/UL — SIGNIFICANT CHANGE UP (ref 150–400)
POTASSIUM SERPL-MCNC: 4.4 MMOL/L — SIGNIFICANT CHANGE UP (ref 3.5–5.3)
POTASSIUM SERPL-SCNC: 4.4 MMOL/L — SIGNIFICANT CHANGE UP (ref 3.5–5.3)
RBC # BLD: 3.5 M/UL — LOW (ref 3.8–5.2)
RBC # FLD: 15.9 % — HIGH (ref 10.3–14.5)
SODIUM SERPL-SCNC: 135 MMOL/L — SIGNIFICANT CHANGE UP (ref 135–145)
SPECIMEN SOURCE: SIGNIFICANT CHANGE UP
TIBC SERPL-MCNC: 175 UG/DL — LOW (ref 220–430)
TRANSFERRIN SERPL-MCNC: 150 MG/DL — LOW (ref 200–360)
UIBC SERPL-MCNC: 132 UG/DL — SIGNIFICANT CHANGE UP (ref 110–370)
WBC # BLD: 6.65 K/UL — SIGNIFICANT CHANGE UP (ref 3.8–10.5)
WBC # FLD AUTO: 6.65 K/UL — SIGNIFICANT CHANGE UP (ref 3.8–10.5)

## 2021-09-13 PROCEDURE — 99233 SBSQ HOSP IP/OBS HIGH 50: CPT | Mod: GC

## 2021-09-13 RX ORDER — CEFTRIAXONE 500 MG/1
1000 INJECTION, POWDER, FOR SOLUTION INTRAMUSCULAR; INTRAVENOUS EVERY 24 HOURS
Refills: 0 | Status: DISCONTINUED | OUTPATIENT
Start: 2021-09-13 | End: 2021-09-15

## 2021-09-13 RX ADMIN — HEPARIN SODIUM 5000 UNIT(S): 5000 INJECTION INTRAVENOUS; SUBCUTANEOUS at 06:25

## 2021-09-13 RX ADMIN — URSODIOL 500 MILLIGRAM(S): 250 TABLET, FILM COATED ORAL at 06:25

## 2021-09-13 RX ADMIN — MONTELUKAST 10 MILLIGRAM(S): 4 TABLET, CHEWABLE ORAL at 22:55

## 2021-09-13 RX ADMIN — HEPARIN SODIUM 5000 UNIT(S): 5000 INJECTION INTRAVENOUS; SUBCUTANEOUS at 17:01

## 2021-09-13 RX ADMIN — ATORVASTATIN CALCIUM 20 MILLIGRAM(S): 80 TABLET, FILM COATED ORAL at 22:55

## 2021-09-13 RX ADMIN — CEFTRIAXONE 100 MILLIGRAM(S): 500 INJECTION, POWDER, FOR SOLUTION INTRAMUSCULAR; INTRAVENOUS at 18:02

## 2021-09-13 RX ADMIN — Medication 88 MICROGRAM(S): at 06:24

## 2021-09-13 RX ADMIN — URSODIOL 500 MILLIGRAM(S): 250 TABLET, FILM COATED ORAL at 17:01

## 2021-09-13 NOTE — PHYSICAL THERAPY INITIAL EVALUATION ADULT - ADDITIONAL COMMENTS
Pt. reports living in an elevator building and using a SC for ambulation. Pt. stated that she has HHA who comes 5 days/week, unable to specify the hrs- pt. appears to be a cooperative, but unreliable historian.

## 2021-09-13 NOTE — PROGRESS NOTE ADULT - ASSESSMENT
jagruti Razo is 82 yo F with PHM CVA x3 (unclear residual), dementia,  HTN, HLD, asthma/COPD, hypothyroidism presented with 1-2 days of dizziness and is admitted for management of failure to thrive and UTI   Herminia Razo is 80 yo F with PHM CVA x3 (unclear residual), dementia,  HTN, HLD, asthma/COPD, hypothyroidism presented with 1-2 days of dizziness and is admitted for management of failure to thrive and UTI

## 2021-09-13 NOTE — PROGRESS NOTE ADULT - PROBLEM SELECTOR PLAN 1
Pt presented with 1-2 days h/o dizziness and weakness. UA: Positive for nitrite, WBC and bacteria and WBC 12.80.  - f/u urine culture  - cw ceftriaxone 1gm IV X 3 doses total Pt presented with 1-2 days h/o dizziness and weakness. UA: Positive for nitrite, WBC and bacteria and WBC 12.80.  - f/u urine culture, GROWING KLEBSIELLA PNEUMONIAE  - cw ceftriaxone 1gm IV X 3 doses total, ON DAY 2

## 2021-09-13 NOTE — PROGRESS NOTE ADULT - PROBLEM SELECTOR PLAN 2
Pt AAO x1 on presentation and appeared confused, however improved post IV NS. In AM patient again altered. May be related to UTI vs symptoms of depression in setting of recent loss of her daughter   -TSH wnl  - f/u B12 and folate level  -psych consult for depression Pt AAO x1 on presentation and appeared confused, however improved post IV NS. In AM patient again altered. May be related to UTI vs symptoms of depression in setting of recent loss of her daughter   -TSH wnl  - f/u B12 and folate level, WNL  - psych consult for depression

## 2021-09-13 NOTE — CONSULT NOTE ADULT - SUBJECTIVE AND OBJECTIVE BOX
Patient is a 81y old  Female who presents with a chief complaint of dizziness and weakness (13 Sep 2021 13:13)       HPI:  Ms Razo is 82 yo F with PHM CVA x3 (unclear residual), dementia,  HTN, HLD, asthma/COPD, hypothyroidism presented with 1-2 days h/o of dizziness. She states that for about last 1-2 days she has been weak and had some dizziness but never lost consciousness, never had bladder incontinence or involuntary body movements. She got extremely emotional mentioning that her daughter  1 month ago but she has some help although did not elaborate on the type of help. She states that she gets around with her ADL using a cane and that she has been eating and drinking on time. She is AAO x3, but takes long time to recall and respond to any question asked. She also c/o pain to tailbone region. She denies headache, vision disturbance, chest pain, SOB, cough, abdominal pain, diarrhea, dysuria, numbness or weakness.        Of note, Per ED granddaughter Francisco Huff [phone] stated that Up until ~1mo ago, pt was being taken care of by pt's daughter, however daughter  1mo ago. Francisco states that the daughter wouldn't allow anyone else in the family to visit the patient so its unclear what her usual baseline was. Since 1mo ago, pt lives alone but family members stop in. Family has been unable to find info on meds or who PMD is. They also mentions that patient has increased confusion over last 2-3days - wouldn't answer questions that she is normally able to answer and was A and O x3 1mo ago.    In the ED:  Vitals: T 97.6, HR 79, /74, RR 18, SpO2 99  Labs: Hb 11.5, WBC 12.80, Plt 309, Na 138, K 3.7, Chloride 101, BUN 97, Cr 0.77, Glucose 132  UA: Positive for nitrite, WBC and bacteria  CT head: No acute intracranial hemorrhage or discernible mass.  Stable chronic microvascular ischemic changes.  Meds: IV NS and Ceftriaxone     Patient is admitted for management of failure to thrive and UTI (11 Sep 2021 19:17)      PAST MEDICAL & SURGICAL HISTORY:  Asthma    HTN (hypertension)    High cholesterol    GERD (gastroesophageal reflux disease)    Hypothyroid    Depression    Primary biliary cirrhosis    Dementia    History of Nissen fundoplication    History of cholecystectomy    H/O knee surgery    S/P cataract surgery        MEDICATIONS  (STANDING):  atorvastatin 20 milliGRAM(s) Oral at bedtime  cefTRIAXone   IVPB 1000 milliGRAM(s) IV Intermittent every 24 hours  heparin   Injectable 5000 Unit(s) SubCutaneous every 12 hours  levothyroxine 88 MICROGram(s) Oral daily  montelukast 10 milliGRAM(s) Oral at bedtime  ursodiol Tablet 500 milliGRAM(s) Oral every 12 hours    MEDICATIONS  (PRN):  acetaminophen   Tablet .. 650 milliGRAM(s) Oral every 6 hours PRN Mild Pain (1 - 3), Moderate Pain (4 - 6)  albuterol/ipratropium for Nebulization 3 milliLiter(s) Nebulizer every 6 hours PRN Shortness of Breath and/or Wheezing        FAMILY HISTORY:  Family history of CVA  mother    FH: myocardial infarction  father        CBC Full  -  ( 12 Sep 2021 07:47 )  WBC Count : 7.76 K/uL  RBC Count : 3.52 M/uL  Hemoglobin : 10.1 g/dL  Hematocrit : 30.8 %  Platelet Count - Automated : 244 K/uL  Mean Cell Volume : 87.5 fl  Mean Cell Hemoglobin : 28.7 pg  Mean Cell Hemoglobin Concentration : 32.8 gm/dL  Auto Neutrophil # : 5.98 K/uL  Auto Lymphocyte # : 0.67 K/uL  Auto Monocyte # : 0.78 K/uL  Auto Eosinophil # : 0.24 K/uL  Auto Basophil # : 0.03 K/uL  Auto Neutrophil % : 77.0 %  Auto Lymphocyte % : 8.6 %  Auto Monocyte % : 10.1 %  Auto Eosinophil % : 3.1 %  Auto Basophil % : 0.4 %          135  |  108  |  19  ----------------------------<  83  4.4   |  19<L>  |  0.57    Ca    8.7      13 Sep 2021 08:32  Phos  2.6     -  Mg     2.1         TPro  6.6  /  Alb  3.4  /  TBili  0.7  /  DBili  x   /  AST  35  /  ALT  21  /  AlkPhos  54  09-11      Urinalysis Basic - ( 11 Sep 2021 15:44 )    Color: Yellow / Appearance: Clear / S.020 / pH: x  Gluc: x / Ketone: 15 mg/dL  / Bili: Negative / Urobili: 0.2 E.U./dL   Blood: x / Protein: 30 mg/dL / Nitrite: POSITIVE   Leuk Esterase: Moderate / RBC: 5-10 /HPF / WBC Many /HPF   Sq Epi: x / Non Sq Epi: 0-5 /HPF / Bacteria: Many /HPF          Radiology:    < from: Xray Chest 1 View- PORTABLE-Routine (Xray Chest 1 View- PORTABLE-Routine .) (21 @ 16:12) >  EXAM:  XR CHEST PORTABLE ROUTINE 1V                          PROCEDURE DATE:  2021          INTERPRETATION:  Clinical History: Infection    Frontal examination of the chest demonstrates the heart to be within normal limits in transverse diameter. No acute infiltrates. Dextroscoliosis thoracic spine with degenerative changes. Calcification involving thoracic aorta.    IMPRESSION: No acute infiltrates        < from: CT Head No Cont (21 @ 17:46) >  EXAM:  CT BRAIN                          PROCEDURE DATE:  2021          INTERPRETATION:  Tia PERAZA MD, have reviewed the images and the report and agree with the findings.     PROCEDURE: CT head without intravenous contrast    CLINICAL INDICATION: Altered mental status. Evaluate for mass/bleed.    TECHNIQUE: Multiple axial images were obtained and viewed at 5 mm intervals from the skull base to the vertex. The images were reviewed in brain and bone windows. Sagittal and coronal reformations are provided.    COMPARISON: CT head from 10/26/2020. CT angiography of the brain from 3/7/2020.    FINDINGS:    The ventricles, cisternal spaces, and cortical sulci are consistent with parenchymal volume loss.    There is no acute intracranial hemorrhage. There is no mass effect, midline shift or extra axial collection.    The gray white differentiation appears preserved without evidence of an acute transcortical infarction. There are patchy foci of periventricular and subcortical hypodensities consistent with moderate chronic microvascular ischemic disease.    The bony windows demonstrates no fractures. The visualized paranasal sinuses and mastoid air cells are predominantly clear.    IMPRESSION:    No acute intracranial hemorrhage or discernible mass.  Stable chronic microvascular ischemic changes.  Consider MRI of the brain for further assessment.          Vital Signs Last 24 Hrs  T(C): 36.9 (13 Sep 2021 08:35), Max: 36.9 (13 Sep 2021 08:35)  T(F): 98.4 (13 Sep 2021 08:35), Max: 98.4 (13 Sep 2021 08:35)  HR: 55 (13 Sep 2021 08:35) (54 - 66)  BP: 135/92 (13 Sep 2021 08:35) (129/72 - 135/92)  BP(mean): --  RR: 17 (13 Sep 2021 08:35) (16 - 17)  SpO2: 98% (13 Sep 2021 08:35) (94% - 98%)        REVIEW OF SYSTEMS: as per HPI          Physical Exam: frail 82 yo  woman lying in bed, awake/alert, no acute complaints    Head: normocephalic, atraumatic    Eyes: PERRLA, EOMI, no nystagmus, sclera anicteric    ENT: nasal discharge, uvula midline, no oropharyngeal erythema/exudate    Neck: supple, negative JVD, negative carotid bruits, no thyromegaly    Chest: CTA bilaterally, neg wheeze/ rhonchi/ rales/ crackles/ egophany    Cardiovascular: regular rate and rhythm, neg murmurs/rubs/gallops    Abdomen: soft, non distended, non tender to palpation in all 4 quadrants, negative rebound/guarding, normal bowel sounds    Extremities: WWP, neg cyanosis/clubbing/edema, negative calf tenderness to palpation, negative Brigid's sign    Neurologic Exam:    Alert and oriented x 2 to person, place, speech fluent w/o dysarthria, follows commands    Cranial Nerves:     II:                         pupils equal, round and reactive to light, visual fields intact   III/ IV/VI:             extraocular movements intact, neg nystagmus, neg ptosis  V:                        facial sensation intact, V1-3 normal  VII:                      face symmetric, no droop, normal eye closure and smile  VIII:                     hearing intact to finger rub bilaterally  IX and X:             no hoarseness, gag intact, palate/ uvula rise symmetrically  XI:                       SCM/ trapezius strength intact bilateral  XII:                      no tongue deviation    Motor Exam:     Right UE:              no focal weakness > 3+/5    Left UE:                no focal weakness > 3+/5      Right LE:               no focal weakness > 3+/5    Left LE:                 no focal weakness > 3+/5               Sensation:             intact to light touch x 4 extremities                                                  DTR:                  biceps/brachioradialis: equal bilaterally                                                       patella/ankle: equal bilaterally                                                   neg clonus                           neg Babinski                          Gait:  not tested        PM&R Impression:    1) deconditioned  2) no focal weakness    Recommendations/ Plan :    1) Physical therapy focusing on therapeutic exercises, bed mobility/transfer out of bed evaluation, progressive ambulation with assistive devices prn.    2) Anticipated Disposition Plan/Recs:   pending functional progress

## 2021-09-13 NOTE — DIETITIAN NUTRITION RISK NOTIFICATION - TREATMENT: THE FOLLOWING DIET HAS BEEN RECOMMENDED
Diet, Regular:   Supplement Feeding Modality:  Oral  Ensure Enlive Cans or Servings Per Day:  1       Frequency:  Two Times a day (09-13-21 @ 15:00) [Pending Verification By Attending]  Diet, Regular (09-12-21 @ 00:31) [Active]

## 2021-09-13 NOTE — CONSULT NOTE ADULT - ASSESSMENT
per Internal Medicine     80 yo F with PHM CVA x3 (unclear residual), dementia,  HTN, HLD, asthma/COPD, hypothyroidism presented with 1-2 days of dizziness and is admitted for management of failure to thrive and UTI      Problem/Plan - 1   ·  Problem: UTI (urinary tract infection).   ·  Plan: Pt presented with 1-2 days h/o dizziness and weakness. UA: Positive for nitrite, WBC and bacteria and WBC 12.80.  - f/u urine culture  - cw ceftriaxone 1gm IV X 3 doses total.    Problem/Plan - 2   ·  Problem: Encephalopathy.   ·  Plan: Pt AAO x1 on presentation and appeared confused, however improved post IV NS. In AM patient again altered. May be related to UTI vs symptoms of depression in setting of recent loss of her daughter   -TSH wnl  - f/u B12 and folate level  -psych consult for depression.    Problem/Plan - 3   ·  Problem: Adult failure to thrive.   ·  Plan: Patient presented with generalized weakness and is extremely frail  - f/u PT recs  - f/u nutrition rec  - regular diet.    Problem/Plan - 4   ·  Problem: Sacral ulcer.   ·  Plan: Pt c/o pain to her tailbone region. Large stage 2 sacral ulcer involving b/l buttocks that do not appear infected.   - f/u wound care.    Problem/Plan - 5   ·  Problem: COPD with asthma.   ·  Plan: Pt is unable elaborate further.   -duonebs PRN for now.    Problem/Plan - 6   ·  Problem: Hypothyroidism.   ·  Plan: -started on synthroid 88 mg daily.    Problem/Plan - 7   ·  Problem: Hypertension.   ·  Plan: Currently normotensive.    Problem/Plan - 8   ·  Problem: Hyperlipemia.   ·  Plan: -started on atorvastatin 20 mg daily    #Primary Billary Cirrhosis  History obtained from chart.  - Continue with Ursodiol 1000mg daily.    Problem/Plan - 9   ·  Problem: Cerebrovascular accident (CVA).   ·  Plan: -started on atorvastatin 20 mg daily.    Problem/Plan - 10   ·  Problem: Nutrition, metabolism, and development symptoms.   ·  Plan; F: none  E: replete prn   N: regular diet  A: lovenox 40 mg SC  GI ppx none  Dispo: [facility].

## 2021-09-13 NOTE — PROGRESS NOTE ADULT - SUBJECTIVE AND OBJECTIVE BOX
INCOMPLETE    O/N Events:    Subjective/ROS: Patient seen and examined at bedside.     Denies Fever/Chills, HA, CP, SOB, n/v, changes in bowel/urinary habits.  12pt ROS otherwise negative.    VITALS  Vital Signs Last 24 Hrs  T(C): 36.9 (13 Sep 2021 08:35), Max: 36.9 (13 Sep 2021 08:35)  T(F): 98.4 (13 Sep 2021 08:35), Max: 98.4 (13 Sep 2021 08:35)  HR: 55 (13 Sep 2021 08:35) (54 - 66)  BP: 135/92 (13 Sep 2021 08:35) (129/72 - 135/92)  BP(mean): --  RR: 17 (13 Sep 2021 08:35) (16 - 17)  SpO2: 98% (13 Sep 2021 08:35) (94% - 98%)    CAPILLARY BLOOD GLUCOSE          PHYSICAL EXAM  General: NAD  Head: NC/AT; MMM; PERRL; EOMI;  Neck: Supple; no JVD  Respiratory: CTAB; no wheezes/rales/rhonchi  Cardiovascular: Regular rhythm/rate; S1/S2+, no murmurs, rubs gallops   Gastrointestinal: Soft; NTND; bowel sounds normal and present  Extremities: WWP; no edema/cyanosis  Neurological: A&Ox3, CNII-XII grossly intact; no obvious focal deficits    MEDICATIONS  (STANDING):  atorvastatin 20 milliGRAM(s) Oral at bedtime  cefTRIAXone   IVPB 1000 milliGRAM(s) IV Intermittent every 24 hours  heparin   Injectable 5000 Unit(s) SubCutaneous every 12 hours  levothyroxine 88 MICROGram(s) Oral daily  montelukast 10 milliGRAM(s) Oral at bedtime  ursodiol Tablet 500 milliGRAM(s) Oral every 12 hours    MEDICATIONS  (PRN):  acetaminophen   Tablet .. 650 milliGRAM(s) Oral every 6 hours PRN Mild Pain (1 - 3), Moderate Pain (4 - 6)  albuterol/ipratropium for Nebulization 3 milliLiter(s) Nebulizer every 6 hours PRN Shortness of Breath and/or Wheezing      Kiwi (Other)  No Known Drug Allergies  Nuts (Rash)  pitted fruits (Hives)  tree fruit (Unknown)  Tree Nuts (Unknown)      LABS                        10.1   7.76  )-----------( 244      ( 12 Sep 2021 07:47 )             30.8     09-13    135  |  108  |  19  ----------------------------<  83  4.4   |  19<L>  |  0.57    Ca    8.7      13 Sep 2021 08:32  Phos  2.6     09-  Mg     2.1     -    TPro  6.6  /  Alb  3.4  /  TBili  0.7  /  DBili  x   /  AST  35  /  ALT  21  /  AlkPhos  54  09-11    PT/INR - ( 11 Sep 2021 15:44 )   PT: 12.2 sec;   INR: 1.02          PTT - ( 11 Sep 2021 15:44 )  PTT:26.5 sec  Urinalysis Basic - ( 11 Sep 2021 15:44 )    Color: Yellow / Appearance: Clear / S.020 / pH: x  Gluc: x / Ketone: 15 mg/dL  / Bili: Negative / Urobili: 0.2 E.U./dL   Blood: x / Protein: 30 mg/dL / Nitrite: POSITIVE   Leuk Esterase: Moderate / RBC: 5-10 /HPF / WBC Many /HPF   Sq Epi: x / Non Sq Epi: 0-5 /HPF / Bacteria: Many /HPF      CARDIAC MARKERS ( 11 Sep 2021 15:44 )  x     / 0.01 ng/mL / 123 U/L / x     / x              IMAGING/EKG/ETC   O/N Events: naeo    Subjective/ROS: Patient seen and examined at bedside.     Denies Fever/Chills, HA, CP, SOB, n/v, changes in bowel/urinary habits.  12pt ROS otherwise negative.    VITALS  Vital Signs Last 24 Hrs  T(C): 36.9 (13 Sep 2021 08:35), Max: 36.9 (13 Sep 2021 08:35)  T(F): 98.4 (13 Sep 2021 08:35), Max: 98.4 (13 Sep 2021 08:35)  HR: 55 (13 Sep 2021 08:35) (54 - 66)  BP: 135/92 (13 Sep 2021 08:35) (129/72 - 135/92)  BP(mean): --  RR: 17 (13 Sep 2021 08:35) (16 - 17)  SpO2: 98% (13 Sep 2021 08:35) (94% - 98%)    CAPILLARY BLOOD GLUCOSE          PHYSICAL EXAM  General: NAD, cachectic  Head: NC/AT; MMM; PERRL; EOMI;  Neck: no JVD  Respiratory: CTAB; no wheezes/rales/rhonchi  Cardiovascular: Regular rhythm/rate; S1/S2+, no murmurs, rubs gallops   Gastrointestinal: Soft; NTND; bowel sounds normal and present  Extremities: WWP; no edema/cyanosis  Neurological: A&Ox3, CNII-XII grossly intact; no obvious focal deficits    MEDICATIONS  (STANDING):  atorvastatin 20 milliGRAM(s) Oral at bedtime  cefTRIAXone   IVPB 1000 milliGRAM(s) IV Intermittent every 24 hours  heparin   Injectable 5000 Unit(s) SubCutaneous every 12 hours  levothyroxine 88 MICROGram(s) Oral daily  montelukast 10 milliGRAM(s) Oral at bedtime  ursodiol Tablet 500 milliGRAM(s) Oral every 12 hours    MEDICATIONS  (PRN):  acetaminophen   Tablet .. 650 milliGRAM(s) Oral every 6 hours PRN Mild Pain (1 - 3), Moderate Pain (4 - 6)  albuterol/ipratropium for Nebulization 3 milliLiter(s) Nebulizer every 6 hours PRN Shortness of Breath and/or Wheezing      Kiwi (Other)  No Known Drug Allergies  Nuts (Rash)  pitted fruits (Hives)  tree fruit (Unknown)  Tree Nuts (Unknown)      LABS                        10.1   7.76  )-----------( 244      ( 12 Sep 2021 07:47 )             30.8     09-13    135  |  108  |  19  ----------------------------<  83  4.4   |  19<L>  |  0.57    Ca    8.7      13 Sep 2021 08:32  Phos  2.6     09-  Mg     2.1     09-    TPro  6.6  /  Alb  3.4  /  TBili  0.7  /  DBili  x   /  AST  35  /  ALT  21  /  AlkPhos  54  09-11    PT/INR - ( 11 Sep 2021 15:44 )   PT: 12.2 sec;   INR: 1.02          PTT - ( 11 Sep 2021 15:44 )  PTT:26.5 sec  Urinalysis Basic - ( 11 Sep 2021 15:44 )    Color: Yellow / Appearance: Clear / S.020 / pH: x  Gluc: x / Ketone: 15 mg/dL  / Bili: Negative / Urobili: 0.2 E.U./dL   Blood: x / Protein: 30 mg/dL / Nitrite: POSITIVE   Leuk Esterase: Moderate / RBC: 5-10 /HPF / WBC Many /HPF   Sq Epi: x / Non Sq Epi: 0-5 /HPF / Bacteria: Many /HPF      CARDIAC MARKERS ( 11 Sep 2021 15:44 )  x     / 0.01 ng/mL / 123 U/L / x     / x              IMAGING/EKG/ETC

## 2021-09-13 NOTE — PHYSICAL THERAPY INITIAL EVALUATION ADULT - KNEE FLEXION MMT, REHAB EVAL
(4) good, left/(4) good, right
Breathing spontaneous and unlabored. Breath sounds clear and equal bilaterally with regular rhythm.

## 2021-09-13 NOTE — PHYSICAL THERAPY INITIAL EVALUATION ADULT - ELBOW EXTENSION MMT, REHAB EVAL
(4-) good minus, left/(4-) good minus, right
daughter, extensive review of medical records, RN/other (specify)/family/significant other

## 2021-09-13 NOTE — PHYSICAL THERAPY INITIAL EVALUATION ADULT - PERTINENT HX OF CURRENT PROBLEM, REHAB EVAL
Pt. is an 81 y.o female presenting with 2 days of dizziness and increased confusion; admitted for FTT and UTI.

## 2021-09-14 LAB
ANION GAP SERPL CALC-SCNC: 5 MMOL/L — SIGNIFICANT CHANGE UP (ref 5–17)
BUN SERPL-MCNC: 13 MG/DL — SIGNIFICANT CHANGE UP (ref 7–23)
CALCIUM SERPL-MCNC: 9 MG/DL — SIGNIFICANT CHANGE UP (ref 8.4–10.5)
CHLORIDE SERPL-SCNC: 102 MMOL/L — SIGNIFICANT CHANGE UP (ref 96–108)
CO2 SERPL-SCNC: 29 MMOL/L — SIGNIFICANT CHANGE UP (ref 22–31)
CREAT SERPL-MCNC: 0.64 MG/DL — SIGNIFICANT CHANGE UP (ref 0.5–1.3)
GLUCOSE SERPL-MCNC: 94 MG/DL — SIGNIFICANT CHANGE UP (ref 70–99)
HCT VFR BLD CALC: 31.9 % — LOW (ref 34.5–45)
HGB BLD-MCNC: 10.3 G/DL — LOW (ref 11.5–15.5)
MAGNESIUM SERPL-MCNC: 1.8 MG/DL — SIGNIFICANT CHANGE UP (ref 1.6–2.6)
MCHC RBC-ENTMCNC: 28.6 PG — SIGNIFICANT CHANGE UP (ref 27–34)
MCHC RBC-ENTMCNC: 32.3 GM/DL — SIGNIFICANT CHANGE UP (ref 32–36)
MCV RBC AUTO: 88.6 FL — SIGNIFICANT CHANGE UP (ref 80–100)
NRBC # BLD: 0 /100 WBCS — SIGNIFICANT CHANGE UP (ref 0–0)
PHOSPHATE SERPL-MCNC: 2.2 MG/DL — LOW (ref 2.5–4.5)
PLATELET # BLD AUTO: 246 K/UL — SIGNIFICANT CHANGE UP (ref 150–400)
POTASSIUM SERPL-MCNC: 3.8 MMOL/L — SIGNIFICANT CHANGE UP (ref 3.5–5.3)
POTASSIUM SERPL-SCNC: 3.8 MMOL/L — SIGNIFICANT CHANGE UP (ref 3.5–5.3)
RBC # BLD: 3.6 M/UL — LOW (ref 3.8–5.2)
RBC # FLD: 15.9 % — HIGH (ref 10.3–14.5)
SODIUM SERPL-SCNC: 136 MMOL/L — SIGNIFICANT CHANGE UP (ref 135–145)
WBC # BLD: 6.31 K/UL — SIGNIFICANT CHANGE UP (ref 3.8–10.5)
WBC # FLD AUTO: 6.31 K/UL — SIGNIFICANT CHANGE UP (ref 3.8–10.5)

## 2021-09-14 PROCEDURE — 99232 SBSQ HOSP IP/OBS MODERATE 35: CPT

## 2021-09-14 RX ORDER — CEFTRIAXONE 500 MG/1
1000 INJECTION, POWDER, FOR SOLUTION INTRAMUSCULAR; INTRAVENOUS
Qty: 0 | Refills: 0 | DISCHARGE
Start: 2021-09-14 | End: 2021-09-14

## 2021-09-14 RX ORDER — RAMIPRIL 5 MG
1 CAPSULE ORAL
Qty: 0 | Refills: 0 | DISCHARGE

## 2021-09-14 RX ORDER — MORPHINE SULFATE 50 MG/1
0.25 CAPSULE, EXTENDED RELEASE ORAL ONCE
Refills: 0 | Status: DISCONTINUED | OUTPATIENT
Start: 2021-09-14 | End: 2021-09-14

## 2021-09-14 RX ADMIN — URSODIOL 500 MILLIGRAM(S): 250 TABLET, FILM COATED ORAL at 17:31

## 2021-09-14 RX ADMIN — Medication 650 MILLIGRAM(S): at 15:35

## 2021-09-14 RX ADMIN — CEFTRIAXONE 100 MILLIGRAM(S): 500 INJECTION, POWDER, FOR SOLUTION INTRAMUSCULAR; INTRAVENOUS at 17:31

## 2021-09-14 RX ADMIN — Medication 650 MILLIGRAM(S): at 16:05

## 2021-09-14 RX ADMIN — ATORVASTATIN CALCIUM 20 MILLIGRAM(S): 80 TABLET, FILM COATED ORAL at 22:57

## 2021-09-14 RX ADMIN — HEPARIN SODIUM 5000 UNIT(S): 5000 INJECTION INTRAVENOUS; SUBCUTANEOUS at 17:32

## 2021-09-14 RX ADMIN — MORPHINE SULFATE 0.25 MILLIGRAM(S): 50 CAPSULE, EXTENDED RELEASE ORAL at 17:59

## 2021-09-14 RX ADMIN — URSODIOL 500 MILLIGRAM(S): 250 TABLET, FILM COATED ORAL at 06:41

## 2021-09-14 RX ADMIN — MORPHINE SULFATE 0.25 MILLIGRAM(S): 50 CAPSULE, EXTENDED RELEASE ORAL at 17:31

## 2021-09-14 RX ADMIN — MONTELUKAST 10 MILLIGRAM(S): 4 TABLET, CHEWABLE ORAL at 22:55

## 2021-09-14 RX ADMIN — Medication 88 MICROGRAM(S): at 06:42

## 2021-09-14 RX ADMIN — HEPARIN SODIUM 5000 UNIT(S): 5000 INJECTION INTRAVENOUS; SUBCUTANEOUS at 06:42

## 2021-09-14 NOTE — DISCHARGE NOTE PROVIDER - CARE PROVIDER_API CALL
Sukh Sapp  [address] [place] [address], [address]  [place], [place], [address]  Phone: [phone]  Fax: [phone]  Established Patient  Scheduled Appointment: 09/17/2021 12:30 PM

## 2021-09-14 NOTE — PROGRESS NOTE ADULT - ASSESSMENT
Herminia Razo is 80 yo F with PHM CVA x3 (unclear residual), dementia,  HTN, HLD, asthma/COPD, hypothyroidism presented with 1-2 days of dizziness and is admitted for management of failure to thrive and UTI

## 2021-09-14 NOTE — DISCHARGE NOTE PROVIDER - PROVIDER TOKENS
FREE:[LAST:[Sekou],FIRST:[Sukh],PHONE:[phone]:[phone]],ADDRESS:[[address] , [address] [place] [address], [facility]],SCHEDULEDAPPT:[09/17/2021],SCHEDULEDAPPTTIME:[12:30 PM],ESTABLISHEDPATIENT:[T]]

## 2021-09-14 NOTE — DISCHARGE NOTE PROVIDER - DETAILS OF MALNUTRITION DIAGNOSIS/DIAGNOSES
This patient has been assessed with a concern for Malnutrition and was treated during this hospitalization for the following Nutrition diagnosis/diagnoses:     -  09/13/2021: Severe protein-calorie malnutrition

## 2021-09-14 NOTE — DISCHARGE NOTE PROVIDER - NSDCMRMEDTOKEN_GEN_ALL_CORE_FT
albuterol 1.25 mg/3 mL (0.042%) inhalation solution: 3 milliliter(s) inhaled 3 times a day, As Needed for shortness of breath   atorvastatin 20 mg oral tablet: 1 tab(s) orally once a day (at bedtime)  cefTRIAXone: 1000 milligram(s) intravenous once a day  montelukast 10 mg oral tablet: 1 tab(s) orally once a day  pantoprazole 40 mg oral delayed release tablet: 1 tab(s) orally once a day (before a meal)  predniSONE 20 mg oral tablet: 2 tab(s) orally once a day   Pulmicort Respules 0.5 mg/2 mL inhalation suspension: 2 milliliter(s) inhaled 2 times a day  ramipril 10 mg oral capsule: 1 cap(s) orally once a day  Synthroid 88 mcg (0.088 mg) oral tablet: 1 tab(s) orally once a day  ursodiol 500 mg oral tablet: 2 tab(s) orally once a day  Ventolin HFA 90 mcg/inh inhalation aerosol: 2 puff(s) inhaled every 4 hours    albuterol 1.25 mg/3 mL (0.042%) inhalation solution: 3 milliliter(s) inhaled 3 times a day, As Needed for shortness of breath   atorvastatin 20 mg oral tablet: 1 tab(s) orally once a day (at bedtime)  cefTRIAXone: 1000 milligram(s) intravenous once a day  montelukast 10 mg oral tablet: 1 tab(s) orally once a day  pantoprazole 40 mg oral delayed release tablet: 1 tab(s) orally once a day (before a meal)  Pulmicort Respules 0.5 mg/2 mL inhalation suspension: 2 milliliter(s) inhaled 2 times a day  Synthroid 88 mcg (0.088 mg) oral tablet: 1 tab(s) orally once a day  ursodiol 500 mg oral tablet: 2 tab(s) orally once a day  Ventolin HFA 90 mcg/inh inhalation aerosol: 2 puff(s) inhaled every 4 hours

## 2021-09-14 NOTE — PROGRESS NOTE ADULT - PROBLEM SELECTOR PLAN 2
RESOLVED  Pt AAO x1 on presentation and appeared confused, however improved post IV NS. In AM patient again altered. May be related to UTI vs symptoms of depression in setting of recent loss of her daughter   -TSH wnl  - f/u B12 and folate level, WNL

## 2021-09-14 NOTE — PROGRESS NOTE ADULT - PROBLEM SELECTOR PLAN 1
Pt presented with 1-2 days h/o dizziness and weakness. UA: Positive for nitrite, WBC and bacteria and WBC 12.80.  - f/u urine culture, GROWING KLEBSIELLA PNEUMONIAE  - cw ceftriaxone 1gm IV X 3 doses total, ON final day

## 2021-09-14 NOTE — PROGRESS NOTE ADULT - SUBJECTIVE AND OBJECTIVE BOX
O/N Events: NAEO    Subjective/ROS: Patient seen and examined at bedside.     Denies Fever/Chills, HA, CP, SOB, n/v, changes in bowel/urinary habits.  12pt ROS otherwise negative.    VITALS  Vital Signs Last 24 Hrs  T(C): 36.9 (14 Sep 2021 20:36), Max: 36.9 (14 Sep 2021 20:36)  T(F): 98.5 (14 Sep 2021 20:36), Max: 98.5 (14 Sep 2021 20:36)  HR: 65 (14 Sep 2021 20:36) (59 - 67)  BP: 121/71 (14 Sep 2021 20:36) (121/71 - 159/74)  BP(mean): --  RR: 189 (14 Sep 2021 20:36) (18 - 189)  SpO2: 96% (14 Sep 2021 15:42) (96% - 98%)    CAPILLARY BLOOD GLUCOSE          PHYSICAL EXAM  General: NAD  Head: NC/AT; MMM; PERRL; EOMI;  Neck: Supple; no JVD  Respiratory: CTAB; no wheezes/rales/rhonchi  Cardiovascular: Regular rhythm/rate; S1/S2+, no murmurs, rubs gallops   Gastrointestinal: Soft; NTND; bowel sounds normal and present  Extremities: WWP; no edema/cyanosis  Neurological: A&Ox3, CNII-XII grossly intact; no obvious focal deficits    MEDICATIONS  (STANDING):  atorvastatin 20 milliGRAM(s) Oral at bedtime  cefTRIAXone   IVPB 1000 milliGRAM(s) IV Intermittent every 24 hours  heparin   Injectable 5000 Unit(s) SubCutaneous every 12 hours  levothyroxine 88 MICROGram(s) Oral daily  montelukast 10 milliGRAM(s) Oral at bedtime  ursodiol Tablet 500 milliGRAM(s) Oral every 12 hours    MEDICATIONS  (PRN):  acetaminophen   Tablet .. 650 milliGRAM(s) Oral every 6 hours PRN Mild Pain (1 - 3), Moderate Pain (4 - 6)  albuterol/ipratropium for Nebulization 3 milliLiter(s) Nebulizer every 6 hours PRN Shortness of Breath and/or Wheezing      Kiwi (Other)  No Known Drug Allergies  Nuts (Rash)  pitted fruits (Hives)  tree fruit (Unknown)  Tree Nuts (Unknown)      LABS                        10.3   6.31  )-----------( 246      ( 14 Sep 2021 08:52 )             31.9     09-14    136  |  102  |  13  ----------------------------<  94  3.8   |  29  |  0.64    Ca    9.0      14 Sep 2021 08:52  Phos  2.2     09-14  Mg     1.8     09-14                  IMAGING/EKG/ETC

## 2021-09-14 NOTE — DISCHARGE NOTE PROVIDER - NSDCCPCAREPLAN_GEN_ALL_CORE_FT
PRINCIPAL DISCHARGE DIAGNOSIS  Diagnosis: Acute UTI  Assessment and Plan of Treatment: Urinary tract infections, also called "UTIs," are infections that affect either the bladder or the kidneys. Bladder infections are more common than kidney infections. Bladder infections happen when bacteria get into the urethra and travel up into the bladder. Kidney infections happen when the bacteria travel even higher, up into the kidneys. The symptoms of a bladder infection include pain or a burning feeling when you urinate, the need to urinate often, the need to urinate suddenly or in a hurry, blood in the urine. Signs that an infection has spread to the kidneys include fever, back pain, or nausea/vomiting. It is important that you take your antibiotics as prescribed and to completion to properly treat your urinary tract infection and prevent antibiotic resistance.  You will get one more dose of your antibiotic tonight at the rehab facility.

## 2021-09-15 VITALS
DIASTOLIC BLOOD PRESSURE: 76 MMHG | TEMPERATURE: 98 F | OXYGEN SATURATION: 97 % | SYSTOLIC BLOOD PRESSURE: 138 MMHG | RESPIRATION RATE: 18 BRPM | HEART RATE: 60 BPM

## 2021-09-15 PROCEDURE — 82728 ASSAY OF FERRITIN: CPT

## 2021-09-15 PROCEDURE — 96374 THER/PROPH/DIAG INJ IV PUSH: CPT

## 2021-09-15 PROCEDURE — G1004: CPT

## 2021-09-15 PROCEDURE — 85730 THROMBOPLASTIN TIME PARTIAL: CPT

## 2021-09-15 PROCEDURE — 70450 CT HEAD/BRAIN W/O DYE: CPT | Mod: MG

## 2021-09-15 PROCEDURE — 99238 HOSP IP/OBS DSCHRG MGMT 30/<: CPT

## 2021-09-15 PROCEDURE — 83735 ASSAY OF MAGNESIUM: CPT

## 2021-09-15 PROCEDURE — 85027 COMPLETE CBC AUTOMATED: CPT

## 2021-09-15 PROCEDURE — 82550 ASSAY OF CK (CPK): CPT

## 2021-09-15 PROCEDURE — 87186 SC STD MICRODIL/AGAR DIL: CPT

## 2021-09-15 PROCEDURE — 83540 ASSAY OF IRON: CPT

## 2021-09-15 PROCEDURE — 84132 ASSAY OF SERUM POTASSIUM: CPT

## 2021-09-15 PROCEDURE — U0005: CPT

## 2021-09-15 PROCEDURE — 82803 BLOOD GASES ANY COMBINATION: CPT

## 2021-09-15 PROCEDURE — 84466 ASSAY OF TRANSFERRIN: CPT

## 2021-09-15 PROCEDURE — U0003: CPT

## 2021-09-15 PROCEDURE — 80053 COMPREHEN METABOLIC PANEL: CPT

## 2021-09-15 PROCEDURE — 85610 PROTHROMBIN TIME: CPT

## 2021-09-15 PROCEDURE — 93005 ELECTROCARDIOGRAM TRACING: CPT

## 2021-09-15 PROCEDURE — 84484 ASSAY OF TROPONIN QUANT: CPT

## 2021-09-15 PROCEDURE — 81001 URINALYSIS AUTO W/SCOPE: CPT

## 2021-09-15 PROCEDURE — 71045 X-RAY EXAM CHEST 1 VIEW: CPT

## 2021-09-15 PROCEDURE — 87086 URINE CULTURE/COLONY COUNT: CPT

## 2021-09-15 PROCEDURE — 85025 COMPLETE CBC W/AUTO DIFF WBC: CPT

## 2021-09-15 PROCEDURE — 84295 ASSAY OF SERUM SODIUM: CPT

## 2021-09-15 PROCEDURE — 83550 IRON BINDING TEST: CPT

## 2021-09-15 PROCEDURE — 84100 ASSAY OF PHOSPHORUS: CPT

## 2021-09-15 PROCEDURE — 80048 BASIC METABOLIC PNL TOTAL CA: CPT

## 2021-09-15 PROCEDURE — 82330 ASSAY OF CALCIUM: CPT

## 2021-09-15 PROCEDURE — 99285 EMERGENCY DEPT VISIT HI MDM: CPT | Mod: 25

## 2021-09-15 PROCEDURE — 36415 COLL VENOUS BLD VENIPUNCTURE: CPT

## 2021-09-15 RX ADMIN — HEPARIN SODIUM 5000 UNIT(S): 5000 INJECTION INTRAVENOUS; SUBCUTANEOUS at 06:29

## 2021-09-15 RX ADMIN — URSODIOL 500 MILLIGRAM(S): 250 TABLET, FILM COATED ORAL at 06:29

## 2021-09-15 RX ADMIN — Medication 88 MICROGRAM(S): at 06:29

## 2021-09-15 NOTE — PROGRESS NOTE ADULT - SUBJECTIVE AND OBJECTIVE BOX
O/N Events: NAEO    Subjective/ROS: Patient seen and examined at bedside.     Denies Fever/Chills, HA, CP, SOB, n/v, changes in bowel/urinary habits.  12pt ROS otherwise negative.    VITALS  Vital Signs Last 24 Hrs  T(C): 36.7 (15 Sep 2021 05:15), Max: 36.9 (14 Sep 2021 20:36)  T(F): 98.1 (15 Sep 2021 05:15), Max: 98.5 (14 Sep 2021 20:36)  HR: 60 (15 Sep 2021 05:15) (60 - 67)  BP: 138/76 (15 Sep 2021 05:15) (121/71 - 138/76)  BP(mean): --  RR: 18 (15 Sep 2021 05:15) (18 - 189)  SpO2: 97% (15 Sep 2021 05:15) (96% - 97%)    CAPILLARY BLOOD GLUCOSE          PHYSICAL EXAM  General: NAD, cachectic  Head: NC/AT; MMM; PERRL; EOMI;  Neck: Supple; no JVD  Respiratory: CTAB; no wheezes/rales/rhonchi  Cardiovascular: Regular rhythm/rate; S1/S2+, no murmurs, rubs gallops   Gastrointestinal: Soft; NTND; bowel sounds normal and present  Extremities: WWP; no edema/cyanosis  Neurological: A&Ox3, CNII-XII grossly intact; no obvious focal deficits    MEDICATIONS  (STANDING):  atorvastatin 20 milliGRAM(s) Oral at bedtime  cefTRIAXone   IVPB 1000 milliGRAM(s) IV Intermittent every 24 hours  heparin   Injectable 5000 Unit(s) SubCutaneous every 12 hours  levothyroxine 88 MICROGram(s) Oral daily  montelukast 10 milliGRAM(s) Oral at bedtime  ursodiol Tablet 500 milliGRAM(s) Oral every 12 hours    MEDICATIONS  (PRN):  acetaminophen   Tablet .. 650 milliGRAM(s) Oral every 6 hours PRN Mild Pain (1 - 3), Moderate Pain (4 - 6)  albuterol/ipratropium for Nebulization 3 milliLiter(s) Nebulizer every 6 hours PRN Shortness of Breath and/or Wheezing      Kiwi (Other)  No Known Drug Allergies  Nuts (Rash)  pitted fruits (Hives)  tree fruit (Unknown)  Tree Nuts (Unknown)      LABS                        10.3   6.31  )-----------( 246      ( 14 Sep 2021 08:52 )             31.9     09-14    136  |  102  |  13  ----------------------------<  94  3.8   |  29  |  0.64    Ca    9.0      14 Sep 2021 08:52  Phos  2.2     09-14  Mg     1.8     09-14                  IMAGING/EKG/ETC

## 2021-09-15 NOTE — PROGRESS NOTE ADULT - PROBLEM SELECTOR PLAN 8
-started on atorvastatin 20 mg daily    #Primary Billary Cirrhosis  History obtained from chart.  - Continue with Ursodiol 1000mg daily.

## 2021-09-15 NOTE — PROGRESS NOTE ADULT - PROBLEM SELECTOR PLAN 9
-started on atorvastatin 20 mg daily

## 2021-09-15 NOTE — DISCHARGE NOTE NURSING/CASE MANAGEMENT/SOCIAL WORK - PATIENT PORTAL LINK FT
You can access the FollowMyHealth Patient Portal offered by [facility] by registering at the following website: [email or web address]. By joining [facility]’s FollowMyHealth portal, you will also be able to view your health information using other applications (apps) compatible with our system.

## 2021-09-15 NOTE — PROGRESS NOTE ADULT - PROBLEM SELECTOR PLAN 10
F: none  E: replete prn   N: regular diet  A: lovenox 40 mg SC  GI ppx none  Dispo: [facility]
F: none  E: replete prn   N: regular diet  A: lovenox 40 mg SC  GI ppx none  Dispo: [facility]
F: none  E: replete prn   N: regular diet  A: lovenox 40 mg SC  GI ppx none  Dispo: [facility]
F: none  E: replete prn   N: regular diet  A: lovenox 40 mg SC  GI ppx none  Dispo: [facility]

## 2021-09-15 NOTE — PROGRESS NOTE ADULT - ASSESSMENT
Herminia Razo is 82 yo F with PHM CVA x3 (unclear residual), dementia,  HTN, HLD, asthma/COPD, hypothyroidism presented with 1-2 days of dizziness and is admitted for management of failure to thrive and UTI

## 2021-09-15 NOTE — PROGRESS NOTE ADULT - PROBLEM SELECTOR PLAN 5
Pt is unable elaborate further.   -braden PRN for now

## 2021-09-15 NOTE — PROGRESS NOTE ADULT - PROBLEM SELECTOR PLAN 3
Patient presented with generalized weakness and is extremely frail  - f/u PT recs  - f/u nutrition rec  - regular diet
Patient presented with generalized weakness and is extremely frail  - f/u PT recs- CHIDI  - f/u nutrition rec- Ensure with Remeron if needed  - regular diet
Patient presented with generalized weakness and is extremely frail  - f/u PT recs  - f/u nutrition rec  - regular diet
Patient presented with generalized weakness and is extremely frail  - f/u PT recs- CHIDI  - f/u nutrition rec- Ensure with Remeron if needed  - regular diet

## 2021-09-15 NOTE — PROGRESS NOTE ADULT - PROBLEM SELECTOR PLAN 4
Pt c/o pain to her tailbone region. Large stage 2 sacral ulcer involving b/l buttocks that do not appear infected.   - f/u wound care

## 2021-09-15 NOTE — PROGRESS NOTE ADULT - PROBLEM SELECTOR PLAN 2
RESOLVED  Pt AAO x1 on presentation and appeared confused, however improved post IV NS and IV Rocephin.  Encephalopathy etiology was toxic metabolic encephalopathy due to UTI that has now resolved. Patient now AOx3  -TSH wnl  - f/u B12 and folate level, WNL

## 2021-09-15 NOTE — PROGRESS NOTE ADULT - PROBLEM SELECTOR PLAN 6
-started on synthroid 88 mg daily

## 2021-09-15 NOTE — PROGRESS NOTE ADULT - NUTRITIONAL ASSESSMENT
This patient has been assessed with a concern for Malnutrition and has been determined to have a diagnosis/diagnoses of Severe protein-calorie malnutrition.    This patient is being managed with:   Diet Regular-  Supplement Feeding Modality:  Oral  Ensure Enlive Cans or Servings Per Day:  1       Frequency:  Two Times a day  Entered: Sep 13 2021  3:00PM    Diet Regular-  Entered: Sep 12 2021 12:31AM    The following pending diet order is being considered for treatment of Severe protein-calorie malnutrition:null
This patient has been assessed with a concern for Malnutrition and has been determined to have a diagnosis/diagnoses of Severe protein-calorie malnutrition.    This patient is being managed with:   Diet Regular-  Supplement Feeding Modality:  Oral  Ensure Enlive Cans or Servings Per Day:  1       Frequency:  Two Times a day  Entered: Sep 13 2021  3:00PM    Diet Regular-  Entered: Sep 12 2021 12:31AM    The following pending diet order is being considered for treatment of Severe protein-calorie malnutrition:null

## 2021-09-17 DIAGNOSIS — J44.9 CHRONIC OBSTRUCTIVE PULMONARY DISEASE, UNSPECIFIED: ICD-10-CM

## 2021-09-17 DIAGNOSIS — N39.0 URINARY TRACT INFECTION, SITE NOT SPECIFIED: ICD-10-CM

## 2021-09-17 DIAGNOSIS — K74.3 PRIMARY BILIARY CIRRHOSIS: ICD-10-CM

## 2021-09-17 DIAGNOSIS — Z86.73 PERSONAL HISTORY OF TRANSIENT ISCHEMIC ATTACK (TIA), AND CEREBRAL INFARCTION WITHOUT RESIDUAL DEFICITS: ICD-10-CM

## 2021-09-17 DIAGNOSIS — Z91.018 ALLERGY TO OTHER FOODS: ICD-10-CM

## 2021-09-17 DIAGNOSIS — K21.9 GASTRO-ESOPHAGEAL REFLUX DISEASE WITHOUT ESOPHAGITIS: ICD-10-CM

## 2021-09-17 DIAGNOSIS — E78.00 PURE HYPERCHOLESTEROLEMIA, UNSPECIFIED: ICD-10-CM

## 2021-09-17 DIAGNOSIS — Z87.891 PERSONAL HISTORY OF NICOTINE DEPENDENCE: ICD-10-CM

## 2021-09-17 DIAGNOSIS — L89.322 PRESSURE ULCER OF LEFT BUTTOCK, STAGE 2: ICD-10-CM

## 2021-09-17 DIAGNOSIS — Z79.52 LONG TERM (CURRENT) USE OF SYSTEMIC STEROIDS: ICD-10-CM

## 2021-09-17 DIAGNOSIS — B96.1 KLEBSIELLA PNEUMONIAE [K. PNEUMONIAE] AS THE CAUSE OF DISEASES CLASSIFIED ELSEWHERE: ICD-10-CM

## 2021-09-17 DIAGNOSIS — I10 ESSENTIAL (PRIMARY) HYPERTENSION: ICD-10-CM

## 2021-09-17 DIAGNOSIS — G93.41 METABOLIC ENCEPHALOPATHY: ICD-10-CM

## 2021-09-17 DIAGNOSIS — R62.7 ADULT FAILURE TO THRIVE: ICD-10-CM

## 2021-09-17 DIAGNOSIS — L89.312 PRESSURE ULCER OF RIGHT BUTTOCK, STAGE 2: ICD-10-CM

## 2021-09-17 DIAGNOSIS — F03.90 UNSPECIFIED DEMENTIA WITHOUT BEHAVIORAL DISTURBANCE: ICD-10-CM

## 2021-09-17 DIAGNOSIS — Z79.899 OTHER LONG TERM (CURRENT) DRUG THERAPY: ICD-10-CM

## 2021-09-17 DIAGNOSIS — F32.9 MAJOR DEPRESSIVE DISORDER, SINGLE EPISODE, UNSPECIFIED: ICD-10-CM

## 2021-09-17 DIAGNOSIS — E03.9 HYPOTHYROIDISM, UNSPECIFIED: ICD-10-CM

## 2021-09-17 DIAGNOSIS — E43 UNSPECIFIED SEVERE PROTEIN-CALORIE MALNUTRITION: ICD-10-CM

## 2021-12-21 NOTE — ED ADULT NURSE NOTE - NSSEPSISSUSPECTED_ED_A_ED
How Severe Are Your Spot(S)?: mild What Type Of Note Output Would You Prefer (Optional)?: Standard Output What Is The Reason For Today's Visit?: Full Body Skin Examination What Is The Reason For Today's Visit? (Being Monitored For X): concerning skin lesions on an annual basis No

## 2021-12-26 VITALS
WEIGHT: 85.1 LBS | TEMPERATURE: 98 F | HEART RATE: 80 BPM | RESPIRATION RATE: 18 BRPM | SYSTOLIC BLOOD PRESSURE: 162 MMHG | HEIGHT: 56 IN | DIASTOLIC BLOOD PRESSURE: 84 MMHG | OXYGEN SATURATION: 99 %

## 2021-12-26 LAB
ALBUMIN SERPL ELPH-MCNC: 3.2 G/DL — LOW (ref 3.3–5)
ALP SERPL-CCNC: 323 U/L — HIGH (ref 40–120)
ALT FLD-CCNC: 45 U/L — SIGNIFICANT CHANGE UP (ref 10–45)
ANION GAP SERPL CALC-SCNC: 12 MMOL/L — SIGNIFICANT CHANGE UP (ref 5–17)
AST SERPL-CCNC: 51 U/L — HIGH (ref 10–40)
BASOPHILS # BLD AUTO: 0.03 K/UL — SIGNIFICANT CHANGE UP (ref 0–0.2)
BASOPHILS NFR BLD AUTO: 0.3 % — SIGNIFICANT CHANGE UP (ref 0–2)
BILIRUB SERPL-MCNC: 0.5 MG/DL — SIGNIFICANT CHANGE UP (ref 0.2–1.2)
BUN SERPL-MCNC: 24 MG/DL — HIGH (ref 7–23)
CALCIUM SERPL-MCNC: 9.1 MG/DL — SIGNIFICANT CHANGE UP (ref 8.4–10.5)
CHLORIDE SERPL-SCNC: 106 MMOL/L — SIGNIFICANT CHANGE UP (ref 96–108)
CK SERPL-CCNC: 49 U/L — SIGNIFICANT CHANGE UP (ref 25–170)
CO2 SERPL-SCNC: 23 MMOL/L — SIGNIFICANT CHANGE UP (ref 22–31)
CREAT SERPL-MCNC: 0.48 MG/DL — LOW (ref 0.5–1.3)
EOSINOPHIL # BLD AUTO: 0.16 K/UL — SIGNIFICANT CHANGE UP (ref 0–0.5)
EOSINOPHIL NFR BLD AUTO: 1.5 % — SIGNIFICANT CHANGE UP (ref 0–6)
GLUCOSE SERPL-MCNC: 92 MG/DL — SIGNIFICANT CHANGE UP (ref 70–99)
HCT VFR BLD CALC: 32.7 % — LOW (ref 34.5–45)
HGB BLD-MCNC: 10.4 G/DL — LOW (ref 11.5–15.5)
IMM GRANULOCYTES NFR BLD AUTO: 0.5 % — SIGNIFICANT CHANGE UP (ref 0–1.5)
LYMPHOCYTES # BLD AUTO: 0.56 K/UL — LOW (ref 1–3.3)
LYMPHOCYTES # BLD AUTO: 5.3 % — LOW (ref 13–44)
MCHC RBC-ENTMCNC: 28.6 PG — SIGNIFICANT CHANGE UP (ref 27–34)
MCHC RBC-ENTMCNC: 31.8 GM/DL — LOW (ref 32–36)
MCV RBC AUTO: 89.8 FL — SIGNIFICANT CHANGE UP (ref 80–100)
MONOCYTES # BLD AUTO: 0.65 K/UL — SIGNIFICANT CHANGE UP (ref 0–0.9)
MONOCYTES NFR BLD AUTO: 6.2 % — SIGNIFICANT CHANGE UP (ref 2–14)
NEUTROPHILS # BLD AUTO: 9.03 K/UL — HIGH (ref 1.8–7.4)
NEUTROPHILS NFR BLD AUTO: 86.2 % — HIGH (ref 43–77)
NRBC # BLD: 0 /100 WBCS — SIGNIFICANT CHANGE UP (ref 0–0)
PLATELET # BLD AUTO: 242 K/UL — SIGNIFICANT CHANGE UP (ref 150–400)
POTASSIUM SERPL-MCNC: 3.7 MMOL/L — SIGNIFICANT CHANGE UP (ref 3.5–5.3)
POTASSIUM SERPL-SCNC: 3.7 MMOL/L — SIGNIFICANT CHANGE UP (ref 3.5–5.3)
PROT SERPL-MCNC: 6.7 G/DL — SIGNIFICANT CHANGE UP (ref 6–8.3)
RBC # BLD: 3.64 M/UL — LOW (ref 3.8–5.2)
RBC # FLD: 15.3 % — HIGH (ref 10.3–14.5)
SARS-COV-2 RNA SPEC QL NAA+PROBE: NEGATIVE — SIGNIFICANT CHANGE UP
SODIUM SERPL-SCNC: 141 MMOL/L — SIGNIFICANT CHANGE UP (ref 135–145)
WBC # BLD: 10.48 K/UL — SIGNIFICANT CHANGE UP (ref 3.8–10.5)
WBC # FLD AUTO: 10.48 K/UL — SIGNIFICANT CHANGE UP (ref 3.8–10.5)

## 2021-12-26 PROCEDURE — 99285 EMERGENCY DEPT VISIT HI MDM: CPT

## 2021-12-26 PROCEDURE — 70450 CT HEAD/BRAIN W/O DYE: CPT | Mod: 26,MA

## 2021-12-26 PROCEDURE — 72131 CT LUMBAR SPINE W/O DYE: CPT | Mod: 26,MA

## 2021-12-26 PROCEDURE — 71046 X-RAY EXAM CHEST 2 VIEWS: CPT | Mod: 26

## 2021-12-26 RX ORDER — ACETAMINOPHEN 500 MG
650 TABLET ORAL ONCE
Refills: 0 | Status: COMPLETED | OUTPATIENT
Start: 2021-12-26 | End: 2021-12-26

## 2021-12-26 RX ADMIN — Medication 650 MILLIGRAM(S): at 22:14

## 2021-12-26 NOTE — ED ADULT NURSE NOTE - NSIMPLEMENTINTERV_GEN_ALL_ED
Implemented All Universal Safety Interventions:  [place] to call system. Call bell, personal items and telephone within reach. Instruct patient to call for assistance. Room bathroom lighting operational. Non-slip footwear when patient is off stretcher. Physically safe environment: no spills, clutter or unnecessary equipment. Stretcher in lowest position, wheels locked, appropriate side rails in place.

## 2021-12-26 NOTE — ED ADULT NURSE NOTE - CADM POA CENTRAL LINE
How Severe Is Your Skin Lesion?: mild Have Your Skin Lesions Been Treated?: not been treated Is This A New Presentation, Or A Follow-Up?: Skin Lesions Which Family Member (Optional)?: Brother Which Family Member (Optional)?: Mother, sister No

## 2021-12-26 NOTE — ED PROVIDER NOTE - CLINICAL SUMMARY MEDICAL DECISION MAKING FREE TEXT BOX
82 yo f with pmh of CVA x3, dementia, HTN, HLD, asthma/COPD, hypothyroidism presents s/p fall. Pt is unable to give details of when and how she fell. States her friend called 911. Reports falling on her buttocks and having pain in her tailbone. Denies ha, dizziness, cp, sob, n/v/d, abd pain, cough. Pt states she has help at home sometimes but cannot provide details. VSS. Pt in NAD. +tenderness to low back. No obvious trauma. Labs, CT lspine and head.

## 2021-12-26 NOTE — ED PROVIDER NOTE - PROGRESS NOTE DETAILS
spoke with pts  Ryles ([phone]) states pt had 3 mechanical falls yesterday all landing on her buttocks. pt has been more confused and falling more frequently and he can not longer take care of her. States he thinks she needs rehab.

## 2021-12-26 NOTE — ED PROVIDER NOTE - ATTENDING CONTRIBUTION TO CARE
I have seen the patient with the PA and agree with above examination and assessment and plan with the following addendum:    Focused PE:   General: NAD. Thin elderly female.   Head: Normocephalic, atraumatic.  Eyes: PERRLA, EOMI.  Cardiac: RRR, no murmurs, rubs or gallops.  Resp: CTA, no wheezes, rales or rhonchi.  GI: Nondistended, nontender, no rebound or guarding.  Neuro: Alert and oriented x 2, no focal deficits.  Ext: Non edematous, nontender.

## 2021-12-26 NOTE — ED PROVIDER NOTE - PHYSICAL EXAMINATION
CONSTITUTIONAL: thin elderly female lying in bed, NAD   HEAD: Normocephalic; atraumatic.   EYES: PERRL; EOM intact; conjunctiva and sclera clear  ENT: normal nose; no rhinorrhea; normal pharynx with no erythema or lesions.   NECK: Supple; non-tender; no LAD  CARDIOVASCULAR: Normal S1, S2; No audible murmurs. Regular rate and rhythm.   RESPIRATORY: Breathing easily; breath sounds clear and equal bilaterally; no wheezes, rhonchi, or rales.  GI: Soft; non-distended; non-tender; no palpable organomegaly.   MSK: FROM at all extremities, normal tone; +tenderness to low back    EXT: No cyanosis or edema; N/V intact  SKIN: Normal for age and race; warm; dry; good turgor; no apparent lesions or rash.   NEURO: A & O x 2. disoriented to time; face symmetric; grossly unremarkable.   PSYCHOLOGICAL: The patient’s mood and manner are appropriate.

## 2021-12-26 NOTE — ED PROVIDER NOTE - OBJECTIVE STATEMENT
80 yo f with pmh of CVA x3, dementia, HTN, HLD, asthma/COPD, hypothyroidism presents s/p fall. Pt is unable to give details of when and how she fell. States her friend called 911. Reports falling on her buttocks and having pain in her tailbone. Denies ha, dizziness, cp, sob, n/v/d, abd pain, cough. Pt states she has help at home sometimes but cannot provide details.

## 2021-12-27 ENCOUNTER — INPATIENT (INPATIENT)
Facility: HOSPITAL | Age: 81
LOS: 1 days | Discharge: NURSING, REHAB OR LONG-TERM CARE | DRG: 551 | End: 2021-12-29
Attending: HOSPITALIST | Admitting: HOSPITALIST
Payer: MEDICARE

## 2021-12-27 DIAGNOSIS — Z98.49 CATARACT EXTRACTION STATUS, UNSPECIFIED EYE: Chronic | ICD-10-CM

## 2021-12-27 DIAGNOSIS — I10 ESSENTIAL (PRIMARY) HYPERTENSION: ICD-10-CM

## 2021-12-27 DIAGNOSIS — Z71.89 OTHER SPECIFIED COUNSELING: ICD-10-CM

## 2021-12-27 DIAGNOSIS — R53.81 OTHER MALAISE: ICD-10-CM

## 2021-12-27 DIAGNOSIS — M41.9 SCOLIOSIS, UNSPECIFIED: ICD-10-CM

## 2021-12-27 DIAGNOSIS — E46 UNSPECIFIED PROTEIN-CALORIE MALNUTRITION: ICD-10-CM

## 2021-12-27 DIAGNOSIS — Z98.89 OTHER SPECIFIED POSTPROCEDURAL STATES: Chronic | ICD-10-CM

## 2021-12-27 DIAGNOSIS — K22.89 OTHER SPECIFIED DISEASE OF ESOPHAGUS: ICD-10-CM

## 2021-12-27 DIAGNOSIS — D64.9 ANEMIA, UNSPECIFIED: ICD-10-CM

## 2021-12-27 DIAGNOSIS — R91.8 OTHER NONSPECIFIC ABNORMAL FINDING OF LUNG FIELD: ICD-10-CM

## 2021-12-27 DIAGNOSIS — L98.429 NON-PRESSURE CHRONIC ULCER OF BACK WITH UNSPECIFIED SEVERITY: ICD-10-CM

## 2021-12-27 DIAGNOSIS — K59.00 CONSTIPATION, UNSPECIFIED: ICD-10-CM

## 2021-12-27 DIAGNOSIS — Z90.49 ACQUIRED ABSENCE OF OTHER SPECIFIED PARTS OF DIGESTIVE TRACT: Chronic | ICD-10-CM

## 2021-12-27 DIAGNOSIS — R45.89 OTHER SYMPTOMS AND SIGNS INVOLVING EMOTIONAL STATE: ICD-10-CM

## 2021-12-27 DIAGNOSIS — Z51.5 ENCOUNTER FOR PALLIATIVE CARE: ICD-10-CM

## 2021-12-27 DIAGNOSIS — J44.9 CHRONIC OBSTRUCTIVE PULMONARY DISEASE, UNSPECIFIED: ICD-10-CM

## 2021-12-27 DIAGNOSIS — E03.9 HYPOTHYROIDISM, UNSPECIFIED: ICD-10-CM
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
## 2021-12-27 DIAGNOSIS — Z98.890 OTHER SPECIFIED POSTPROCEDURAL STATES: Chronic | ICD-10-CM

## 2021-12-27 DIAGNOSIS — R62.7 ADULT FAILURE TO THRIVE: ICD-10-CM

## 2021-12-27 DIAGNOSIS — S32.10XA UNSPECIFIED FRACTURE OF SACRUM, INITIAL ENCOUNTER FOR CLOSED FRACTURE: ICD-10-CM

## 2021-12-27 DIAGNOSIS — R63.8 OTHER SYMPTOMS AND SIGNS CONCERNING FOOD AND FLUID INTAKE: ICD-10-CM

## 2021-12-27 DIAGNOSIS — N39.0 URINARY TRACT INFECTION, SITE NOT SPECIFIED: ICD-10-CM

## 2021-12-27 DIAGNOSIS — E78.5 HYPERLIPIDEMIA, UNSPECIFIED: ICD-10-CM

## 2021-12-27 DIAGNOSIS — E43 UNSPECIFIED SEVERE PROTEIN-CALORIE MALNUTRITION: ICD-10-CM

## 2021-12-27 DIAGNOSIS — F03.90 UNSPECIFIED DEMENTIA WITHOUT BEHAVIORAL DISTURBANCE: ICD-10-CM

## 2021-12-27 LAB
ALBUMIN SERPL ELPH-MCNC: 3 G/DL — LOW (ref 3.3–5)
ALP SERPL-CCNC: 285 U/L — HIGH (ref 40–120)
ALT FLD-CCNC: 38 U/L — SIGNIFICANT CHANGE UP (ref 10–45)
ANION GAP SERPL CALC-SCNC: 10 MMOL/L — SIGNIFICANT CHANGE UP (ref 5–17)
ANISOCYTOSIS BLD QL: SLIGHT — SIGNIFICANT CHANGE UP
AST SERPL-CCNC: 41 U/L — HIGH (ref 10–40)
BASOPHILS # BLD AUTO: 0 K/UL — SIGNIFICANT CHANGE UP (ref 0–0.2)
BASOPHILS NFR BLD AUTO: 0 % — SIGNIFICANT CHANGE UP (ref 0–2)
BILIRUB SERPL-MCNC: 0.6 MG/DL — SIGNIFICANT CHANGE UP (ref 0.2–1.2)
BLD GP AB SCN SERPL QL: NEGATIVE — SIGNIFICANT CHANGE UP
BUN SERPL-MCNC: 21 MG/DL — SIGNIFICANT CHANGE UP (ref 7–23)
CALCIUM SERPL-MCNC: 9 MG/DL — SIGNIFICANT CHANGE UP (ref 8.4–10.5)
CHLORIDE SERPL-SCNC: 107 MMOL/L — SIGNIFICANT CHANGE UP (ref 96–108)
CK MB CFR SERPL CALC: 1.6 NG/ML — SIGNIFICANT CHANGE UP (ref 0–6.7)
CO2 SERPL-SCNC: 24 MMOL/L — SIGNIFICANT CHANGE UP (ref 22–31)
CREAT SERPL-MCNC: 0.5 MG/DL — SIGNIFICANT CHANGE UP (ref 0.5–1.3)
EOSINOPHIL # BLD AUTO: 0.28 K/UL — SIGNIFICANT CHANGE UP (ref 0–0.5)
EOSINOPHIL NFR BLD AUTO: 4.4 % — SIGNIFICANT CHANGE UP (ref 0–6)
FERRITIN SERPL-MCNC: 67 NG/ML — SIGNIFICANT CHANGE UP (ref 15–150)
FOLATE SERPL-MCNC: >20 NG/ML — SIGNIFICANT CHANGE UP
GGT SERPL-CCNC: 148 U/L — HIGH (ref 8–40)
GIANT PLATELETS BLD QL SMEAR: PRESENT — SIGNIFICANT CHANGE UP
GLUCOSE BLDC GLUCOMTR-MCNC: 91 MG/DL — SIGNIFICANT CHANGE UP (ref 70–99)
GLUCOSE BLDC GLUCOMTR-MCNC: 94 MG/DL — SIGNIFICANT CHANGE UP (ref 70–99)
GLUCOSE SERPL-MCNC: 84 MG/DL — SIGNIFICANT CHANGE UP (ref 70–99)
HCT VFR BLD CALC: 32.9 % — LOW (ref 34.5–45)
HGB BLD-MCNC: 10.5 G/DL — LOW (ref 11.5–15.5)
HYPOCHROMIA BLD QL: SLIGHT — SIGNIFICANT CHANGE UP
IRON SATN MFR SERPL: 12 % — LOW (ref 14–50)
IRON SATN MFR SERPL: 29 UG/DL — LOW (ref 30–160)
LYMPHOCYTES # BLD AUTO: 0.16 K/UL — LOW (ref 1–3.3)
LYMPHOCYTES # BLD AUTO: 2.6 % — LOW (ref 13–44)
MAGNESIUM SERPL-MCNC: 2 MG/DL — SIGNIFICANT CHANGE UP (ref 1.6–2.6)
MANUAL SMEAR VERIFICATION: SIGNIFICANT CHANGE UP
MCHC RBC-ENTMCNC: 28.6 PG — SIGNIFICANT CHANGE UP (ref 27–34)
MCHC RBC-ENTMCNC: 31.9 GM/DL — LOW (ref 32–36)
MCV RBC AUTO: 89.6 FL — SIGNIFICANT CHANGE UP (ref 80–100)
MONOCYTES # BLD AUTO: 0.33 K/UL — SIGNIFICANT CHANGE UP (ref 0–0.9)
MONOCYTES NFR BLD AUTO: 5.2 % — SIGNIFICANT CHANGE UP (ref 2–14)
NEUTROPHILS # BLD AUTO: 5.5 K/UL — SIGNIFICANT CHANGE UP (ref 1.8–7.4)
NEUTROPHILS NFR BLD AUTO: 86 % — HIGH (ref 43–77)
NEUTS BAND # BLD: 0.9 % — SIGNIFICANT CHANGE UP (ref 0–8)
OVALOCYTES BLD QL SMEAR: SLIGHT — SIGNIFICANT CHANGE UP
PHOSPHATE SERPL-MCNC: 2.8 MG/DL — SIGNIFICANT CHANGE UP (ref 2.5–4.5)
PLAT MORPH BLD: ABNORMAL
PLATELET # BLD AUTO: 241 K/UL — SIGNIFICANT CHANGE UP (ref 150–400)
POLYCHROMASIA BLD QL SMEAR: SLIGHT — SIGNIFICANT CHANGE UP
POTASSIUM SERPL-MCNC: 3.5 MMOL/L — SIGNIFICANT CHANGE UP (ref 3.5–5.3)
POTASSIUM SERPL-SCNC: 3.5 MMOL/L — SIGNIFICANT CHANGE UP (ref 3.5–5.3)
PROCALCITONIN SERPL-MCNC: 0.18 NG/ML — HIGH (ref 0.02–0.1)
PROT SERPL-MCNC: 6.4 G/DL — SIGNIFICANT CHANGE UP (ref 6–8.3)
RBC # BLD: 3.67 M/UL — LOW (ref 3.8–5.2)
RBC # FLD: 15.2 % — HIGH (ref 10.3–14.5)
RBC BLD AUTO: ABNORMAL
RH IG SCN BLD-IMP: POSITIVE — SIGNIFICANT CHANGE UP
SODIUM SERPL-SCNC: 141 MMOL/L — SIGNIFICANT CHANGE UP (ref 135–145)
TIBC SERPL-MCNC: 245 UG/DL — SIGNIFICANT CHANGE UP (ref 220–430)
TRANSFERRIN SERPL-MCNC: 210 MG/DL — SIGNIFICANT CHANGE UP (ref 200–360)
TROPONIN T SERPL-MCNC: <0.01 NG/ML — SIGNIFICANT CHANGE UP (ref 0–0.01)
TSH SERPL-MCNC: 8.98 UIU/ML — HIGH (ref 0.27–4.2)
UIBC SERPL-MCNC: 216 UG/DL — SIGNIFICANT CHANGE UP (ref 110–370)
VARIANT LYMPHS # BLD: 0.9 % — SIGNIFICANT CHANGE UP (ref 0–6)
VIT B12 SERPL-MCNC: 685 PG/ML — SIGNIFICANT CHANGE UP (ref 232–1245)
WBC # BLD: 6.33 K/UL — SIGNIFICANT CHANGE UP (ref 3.8–10.5)
WBC # FLD AUTO: 6.33 K/UL — SIGNIFICANT CHANGE UP (ref 3.8–10.5)

## 2021-12-27 PROCEDURE — 99221 1ST HOSP IP/OBS SF/LOW 40: CPT

## 2021-12-27 PROCEDURE — 74018 RADEX ABDOMEN 1 VIEW: CPT | Mod: 26

## 2021-12-27 PROCEDURE — 99497 ADVNCD CARE PLAN 30 MIN: CPT | Mod: 25

## 2021-12-27 PROCEDURE — 99358 PROLONG SERVICE W/O CONTACT: CPT | Mod: NC

## 2021-12-27 PROCEDURE — 72220 X-RAY EXAM SACRUM TAILBONE: CPT | Mod: 26

## 2021-12-27 PROCEDURE — 99223 1ST HOSP IP/OBS HIGH 75: CPT

## 2021-12-27 PROCEDURE — 99223 1ST HOSP IP/OBS HIGH 75: CPT | Mod: GC

## 2021-12-27 PROCEDURE — 74230 X-RAY XM SWLNG FUNCJ C+: CPT | Mod: 26

## 2021-12-27 PROCEDURE — 71260 CT THORAX DX C+: CPT | Mod: 26

## 2021-12-27 PROCEDURE — 73523 X-RAY EXAM HIPS BI 5/> VIEWS: CPT | Mod: 26

## 2021-12-27 PROCEDURE — 99222 1ST HOSP IP/OBS MODERATE 55: CPT

## 2021-12-27 RX ORDER — ENOXAPARIN SODIUM 100 MG/ML
30 INJECTION SUBCUTANEOUS EVERY 24 HOURS
Refills: 0 | Status: DISCONTINUED | OUTPATIENT
Start: 2021-12-27 | End: 2021-12-29

## 2021-12-27 RX ORDER — DEXTROSE 50 % IN WATER 50 %
15 SYRINGE (ML) INTRAVENOUS ONCE
Refills: 0 | Status: DISCONTINUED | OUTPATIENT
Start: 2021-12-27 | End: 2021-12-29

## 2021-12-27 RX ORDER — POLYETHYLENE GLYCOL 3350 17 G/17G
17 POWDER, FOR SOLUTION ORAL DAILY
Refills: 0 | Status: DISCONTINUED | OUTPATIENT
Start: 2021-12-27 | End: 2021-12-29

## 2021-12-27 RX ORDER — MONTELUKAST 4 MG/1
10 TABLET, CHEWABLE ORAL DAILY
Refills: 0 | Status: DISCONTINUED | OUTPATIENT
Start: 2021-12-27 | End: 2021-12-27

## 2021-12-27 RX ORDER — PANTOPRAZOLE SODIUM 20 MG/1
40 TABLET, DELAYED RELEASE ORAL
Refills: 0 | Status: DISCONTINUED | OUTPATIENT
Start: 2021-12-27 | End: 2021-12-27

## 2021-12-27 RX ORDER — BUDESONIDE, MICRONIZED 100 %
0.5 POWDER (GRAM) MISCELLANEOUS
Refills: 0 | Status: DISCONTINUED | OUTPATIENT
Start: 2021-12-27 | End: 2021-12-29

## 2021-12-27 RX ORDER — ACETAMINOPHEN 500 MG
650 TABLET ORAL EVERY 6 HOURS
Refills: 0 | Status: DISCONTINUED | OUTPATIENT
Start: 2021-12-27 | End: 2021-12-29

## 2021-12-27 RX ORDER — LIDOCAINE 4 G/100G
1 CREAM TOPICAL EVERY 24 HOURS
Refills: 0 | Status: DISCONTINUED | OUTPATIENT
Start: 2021-12-27 | End: 2021-12-29

## 2021-12-27 RX ORDER — ESCITALOPRAM OXALATE 10 MG/1
5 TABLET, FILM COATED ORAL AT BEDTIME
Refills: 0 | Status: DISCONTINUED | OUTPATIENT
Start: 2021-12-27 | End: 2021-12-29

## 2021-12-27 RX ORDER — DEXTROSE 50 % IN WATER 50 %
25 SYRINGE (ML) INTRAVENOUS ONCE
Refills: 0 | Status: DISCONTINUED | OUTPATIENT
Start: 2021-12-27 | End: 2021-12-29

## 2021-12-27 RX ORDER — SODIUM CHLORIDE 9 MG/ML
1000 INJECTION, SOLUTION INTRAVENOUS
Refills: 0 | Status: DISCONTINUED | OUTPATIENT
Start: 2021-12-27 | End: 2021-12-29

## 2021-12-27 RX ORDER — HALOPERIDOL DECANOATE 100 MG/ML
5 INJECTION INTRAMUSCULAR ONCE
Refills: 0 | Status: COMPLETED | OUTPATIENT
Start: 2021-12-27 | End: 2021-12-27

## 2021-12-27 RX ORDER — SODIUM CHLORIDE 9 MG/ML
1000 INJECTION, SOLUTION INTRAVENOUS
Refills: 0 | Status: DISCONTINUED | OUTPATIENT
Start: 2021-12-27 | End: 2021-12-28

## 2021-12-27 RX ORDER — ATORVASTATIN CALCIUM 80 MG/1
20 TABLET, FILM COATED ORAL AT BEDTIME
Refills: 0 | Status: DISCONTINUED | OUTPATIENT
Start: 2021-12-27 | End: 2021-12-27

## 2021-12-27 RX ORDER — LANOLIN ALCOHOL/MO/W.PET/CERES
3 CREAM (GRAM) TOPICAL AT BEDTIME
Refills: 0 | Status: DISCONTINUED | OUTPATIENT
Start: 2021-12-27 | End: 2021-12-27

## 2021-12-27 RX ORDER — INSULIN LISPRO 100/ML
VIAL (ML) SUBCUTANEOUS
Refills: 0 | Status: DISCONTINUED | OUTPATIENT
Start: 2021-12-27 | End: 2021-12-29

## 2021-12-27 RX ORDER — LEVOTHYROXINE SODIUM 125 MCG
66 TABLET ORAL AT BEDTIME
Refills: 0 | Status: DISCONTINUED | OUTPATIENT
Start: 2021-12-27 | End: 2021-12-28

## 2021-12-27 RX ORDER — DEXTROSE 50 % IN WATER 50 %
12.5 SYRINGE (ML) INTRAVENOUS ONCE
Refills: 0 | Status: DISCONTINUED | OUTPATIENT
Start: 2021-12-27 | End: 2021-12-29

## 2021-12-27 RX ORDER — GLUCAGON INJECTION, SOLUTION 0.5 MG/.1ML
1 INJECTION, SOLUTION SUBCUTANEOUS ONCE
Refills: 0 | Status: DISCONTINUED | OUTPATIENT
Start: 2021-12-27 | End: 2021-12-29

## 2021-12-27 RX ORDER — LEVOTHYROXINE SODIUM 125 MCG
88 TABLET ORAL DAILY
Refills: 0 | Status: DISCONTINUED | OUTPATIENT
Start: 2021-12-27 | End: 2021-12-27

## 2021-12-27 RX ORDER — ACETAMINOPHEN 500 MG
650 TABLET ORAL EVERY 6 HOURS
Refills: 0 | Status: DISCONTINUED | OUTPATIENT
Start: 2021-12-27 | End: 2021-12-27

## 2021-12-27 RX ORDER — PANTOPRAZOLE SODIUM 20 MG/1
40 TABLET, DELAYED RELEASE ORAL DAILY
Refills: 0 | Status: DISCONTINUED | OUTPATIENT
Start: 2021-12-27 | End: 2021-12-28

## 2021-12-27 RX ADMIN — SODIUM CHLORIDE 45 MILLILITER(S): 9 INJECTION, SOLUTION INTRAVENOUS at 19:53

## 2021-12-27 RX ADMIN — HALOPERIDOL DECANOATE 5 MILLIGRAM(S): 100 INJECTION INTRAMUSCULAR at 04:09

## 2021-12-27 RX ADMIN — ENOXAPARIN SODIUM 30 MILLIGRAM(S): 100 INJECTION SUBCUTANEOUS at 06:10

## 2021-12-27 RX ADMIN — HALOPERIDOL DECANOATE 5 MILLIGRAM(S): 100 INJECTION INTRAMUSCULAR at 02:25

## 2021-12-27 RX ADMIN — Medication 650 MILLIGRAM(S): at 19:44

## 2021-12-27 RX ADMIN — Medication 650 MILLIGRAM(S): at 18:03

## 2021-12-27 RX ADMIN — Medication 66 MICROGRAM(S): at 21:46

## 2021-12-27 RX ADMIN — Medication 650 MILLIGRAM(S): at 11:27

## 2021-12-27 RX ADMIN — Medication 650 MILLIGRAM(S): at 12:27

## 2021-12-27 RX ADMIN — PANTOPRAZOLE SODIUM 40 MILLIGRAM(S): 20 TABLET, DELAYED RELEASE ORAL at 11:34

## 2021-12-27 RX ADMIN — Medication 1 DROP(S): at 06:10

## 2021-12-27 RX ADMIN — Medication 1 DROP(S): at 18:08

## 2021-12-27 RX ADMIN — POLYETHYLENE GLYCOL 3350 17 GRAM(S): 17 POWDER, FOR SOLUTION ORAL at 18:03

## 2021-12-27 RX ADMIN — ESCITALOPRAM OXALATE 5 MILLIGRAM(S): 10 TABLET, FILM COATED ORAL at 21:44

## 2021-12-27 RX ADMIN — Medication 0.5 MILLIGRAM(S): at 21:46

## 2021-12-27 NOTE — PROGRESS NOTE ADULT - PROBLEM SELECTOR PLAN 5
- CT L-spine with some stool burden  - start bowel regimen  - continue to monitor  - may be contributing to recurrent UTIs, as below Stage I pressure ulcer noted on backside while turning pt.  - wound care  - monitor Stage I pressure ulcer noted on backside while turning pt.  - monitor

## 2021-12-27 NOTE — SWALLOW VFSS/MBS ASSESSMENT ADULT - SLP GENERAL OBSERVATIONS
Pt received awake and alert, pleasant, in NAD.  She followed basic commands and responded to basic Qs appropriately.  Denied dysphagia or odynophagia symptoms

## 2021-12-27 NOTE — CONSULT NOTE ADULT - SUBJECTIVE AND OBJECTIVE BOX
HPI:  82 yo F with PMH CVA x3, dementia (AOx3), HTN, HLD, COPD, asthma, hypothyroidism, and recurrent UTIs who p/w frequent falls and sacral/back pain x2d.     Lives with her godson/caregiver who has been taking care of her for over a decade. Majority of history obtained from caretaker Channing Gould, as pt is a poor historian. Per Channing, pt has had 3 falls in the last 24h, all witnessed and likely in setting of gait instability. Intermittently walks with a walker, but had a third fall 12/26 and since had been complaining of sacral/tailbone pain. He had her sit down but she repeatedly stood up d/t pain. Because the pain would not go away despite tylenol and rest, Channing sent her to the hospital. He states that she does not have a history of similar falls and is at baseline functional and mostly independent. For the last week he has hired other caregivers to watch after her. Has a history of recurrent UTIs as she wears adult diapers and does not notify anyone of when she has urinated. No recent changes to medication. At baseline has poor appetite and eats little. No documentation of COVID vaccination and pt does not recall. ROS otherwise negative. No other concerns or complaints.    On arrival, T 98.3, HR 80, /84, RR 18 O2sat 99% RA. EKG pending. Labs with WBC 10.48, 86% neutrophils, Hb 10.4, MCV 89.8, alb 3.2, , AST 51, CK neg. COVID neg. CXR without any acute infiltrate. CTH neg. CT L-spine with degenerative changes, severe scoliosis, neg fx, massive dilation of distal esophagus with food consistency materials vs. distended hiatal hernia. Pt given tylenol. Admitted to [facility] for further observation and management.  (27 Dec 2021 01:02)      PRESENT SYMPTOMS:   [ ]Unable to obtain due to poor mentation   Source if other than patient:  [ ]Family   [ ]Team     Pain:  Location :        Quality:  Radiation:  Timing:  Aggravating factors:  Minimal acceptable level (0-10 scale):   Severity in last 24h (0-10 scale) :  Current score (0-10 scale):  Improves with:     PAIN AD Score:   [email or web address] (press ctrl +  left click to view)    If [ ], pt denies symptom.   Dyspnea:         [ ]Mild [ ]Moderate [ ]Severe  Anxiety:           [ ]Mild [ ]Moderate [ ]Severe  Fatigue:           [ ]Mild [ ]Moderate [ ]Severe  Nausea:           [ ]Mild [ ]Moderate [ ]Severe  Loss of appetite:     [ ]Mild [ ]Moderate [ ]Severe  Constipation:   [ ]Mild [ ]Moderate [ ]Severe  LBM   Grief Present   [ ] Yes   [ ] No   Other Symptoms:    [ ]All other review of systems negative     Opiate Naive (Y/N):   iStop reviewed (Y/N): Yes.   No Rx found on iStop review (Ref#:           PAST MEDICAL & SURGICAL HISTORY:  Asthma  HTN (hypertension)  High cholesterol  GERD (gastroesophageal reflux disease)  Hypothyroid  Depression  Primary biliary cirrhosis  Dementia  History of Nissen fundoplication  History of cholecystectomy  H/O knee surgery  S/P cataract surgery    FAMILY HISTORY:  Family history of CVA  mother    FH: myocardial infarction  father     Reviewed and found non contributory in mother or father    SOCIAL HISTORY:   - Support system: [ ] strong [ ] adequate [ ] inadequate    PSYCHOSOCIAL ASSESSMENT:  - Confucianism/Spiritual practice:  - Role of organized Protestant [ ] important [ ] some [ ] unable to assess dt pt mentation     - Coping: [ ] well [ ] with difficulty [ ] poor coping [ ] unable to assess dt pt mentation  - What is most important to patient?  - What worries patient the most?  - Is patient at peace? :     Allergies    Kiwi (Other)  No Known Drug Allergies  Nuts (Rash)  pitted fruits (Hives)  tree fruit (Unknown)  Tree Nuts (Unknown)    Intolerances        Medications:      MEDICATIONS  (STANDING):  acetaminophen     Tablet .. 650 milliGRAM(s) Oral every 6 hours  artificial tears (preservative free) Ophthalmic Solution 1 Drop(s) Both EYES two times a day  bisacodyl Suppository 10 milliGRAM(s) Rectal once  buDESOnide    Inhalation Suspension 0.5 milliGRAM(s) Inhalation two times a day  dextrose 40% Gel 15 Gram(s) Oral once  dextrose 5%. 1000 milliLiter(s) (50 mL/Hr) IV Continuous <Continuous>  dextrose 5%. 1000 milliLiter(s) (100 mL/Hr) IV Continuous <Continuous>  dextrose 50% Injectable 25 Gram(s) IV Push once  dextrose 50% Injectable 12.5 Gram(s) IV Push once  dextrose 50% Injectable 25 Gram(s) IV Push once  enoxaparin Injectable 30 milliGRAM(s) SubCutaneous every 24 hours  glucagon  Injectable 1 milliGRAM(s) IntraMuscular once  insulin lispro (ADMELOG) corrective regimen sliding scale   SubCutaneous three times a day before meals  levothyroxine Injectable 66 MICROGram(s) IV Push at bedtime  lidocaine   4% Patch 1 Patch Transdermal every 24 hours  pantoprazole  Injectable 40 milliGRAM(s) IV Push daily    MEDICATIONS  (PRN):      Labs:    CBC:                        10.5   6.33  )-----------( 241      ( 27 Dec 2021 08:16 )             32.9     CMP:    12-27    141  |  107  |  21  ----------------------------<  84  3.5   |  24  |  0.50    Ca    9.0      27 Dec 2021 08:03  Phos  2.8     12-27  Mg     2.0     12-27    TPro  6.4  /  Alb  3.0<L>  /  TBili  0.6  /  DBili  x   /  AST  41<H>  /  ALT  38  /  AlkPhos  285<H>  12-27           RADIOLOGY & ADDITIONAL STUDIES:  Imaging:  Reviewed    PROTEIN CALORIE MALNUTRITION PRESENT:  [ ] Yes  [ ] No  Albumin, Serum:     [ ] PPSV2 < or = to 30%   [ ] significant weight loss    [ ] poor nutritional intake  [ ] catabolic state   [ ] anasarca       [ ] Artificial Nutrition    PEx:  T(C): 36.4 (12-27-21 @ 05:10), Max: 36.8 (12-26-21 @ 20:52)  HR: 60 (12-27-21 @ 09:03) (60 - 80)  BP: 166/92 (12-27-21 @ 09:03) (144/70 - 166/92)  RR: 18 (12-27-21 @ 09:03) (16 - 18)  SpO2: 95% (12-27-21 @ 09:03) (95% - 99%)  Wt(kg): --    GEN: NAD  HEENT: atraumatic, no temporal wasting, MMM  RESP: Regular rhythm, no accessory muscle use, CTAB, diminished bilat bases  CARD: No tachycardia, regular rhythm, s1/s2  GI: soft, non distended, non tender with light palpation, normoactive BSx4  EXTR: No cyanosis, No edema  NEURO: Alert, oriented x3  PSYCH:     Preadmit Karnofsky:  %             Current Karnofsky:     %    BMI:  Wt:  Cachexia (Y/N):     PALLIATIVE MEDICINE COORDINATION OF CARE DOCUMENTATION: [x] Inpatient Consult  Non-Face-to-Face prolonged service provided that relates to (face-to-face) care that has or will occur and ongoing patient management, including one or more of the following: - Reviewed documentation from other physicians and other health care professional services - Reviewed medical records and diagnostic / radiology study results - Coordination with patient's support system  ************************************************************************  MEDICATION REVIEW:  - See Medication List Above    ISTOP REFERENCE:   - no active Rxs   - PRN usage: NO PRN'S  ------------------------------------------------------------------------  COORDINATION OF CARE:  - Palliative Care consulted for: GOC / Symptom Management  - Patient (to be) assessed:  - Patient previously seen by Palliative Care service: NO    ADVANCE CARE PLANNING  - Code status: FULL  - MOLST reviewed in chart: NONE; None found on Alpha  - HCP/ Surrogate: NONE found on Alpha  - GOC documents: NONE found on Alpha  - HCP/ Living will/Other Advanced Directives in Alpha: NONE found on Alpha  ------------------------------------------------------------------------  CARE PROVIDER DOCUMENTATION:  - SW/BHARAT notes: Remains medically active  -   -     PLAN OF CARE  - Known admissions in past year:  - Current admit date:  - LOS:  - LACE score:   - Current dispo plan: TO BE DETERMINED    12-27-21  ------------------------------------------------------------------------  - Time Spent/Chart reviewed: 31 Minutes [including time used to gather, review and transfer data]  - Start: 1120 a  - End: 1150a    Prolonged services rendered, as part of this patient's care provided by Palliative Medicine, include: i. chart review for provider and ancillary service documentation, ii. pertinent diagnostics including laboratory and imaging studies, iii. medication review including PRN use, iv. admission history including previous palliative care encounters and GOC notes, v. advance care planning documents including HCP and MOLST forms in Alpha. Part of Palliative Medicine extended evaluation and management also involves coordination of care with our IDT, the primary and consulting teams, and unit CM/SW and Hospice if eligible. Recommendations based on the information gathered and discussed are outlined in the A/P of Palliative notes. HPI:  82 yo F with PMH CVA x3, dementia (AOx3), HTN, HLD, COPD, asthma, hypothyroidism, and recurrent UTIs who p/w frequent falls and sacral/back pain x2d.     Lives with her godson/caregiver who has been taking care of her for over a decade. Majority of history obtained from caretaker Channing Gould, as pt is a poor historian. Per Channing, pt has had 3 falls in the last 24h, all witnessed and likely in setting of gait instability. Intermittently walks with a walker, but had a third fall  and since had been complaining of sacral/tailbone pain. He had her sit down but she repeatedly stood up d/t pain. Because the pain would not go away despite tylenol and rest, Channing sent her to the hospital. He states that she does not have a history of similar falls and is at baseline functional and mostly independent. For the last week he has hired other caregivers to watch after her. Has a history of recurrent UTIs as she wears adult diapers and does not notify anyone of when she has urinated. No recent changes to medication. At baseline has poor appetite and eats little. No documentation of COVID vaccination and pt does not recall. ROS otherwise negative. No other concerns or complaints.    On arrival, T 98.3, HR 80, /84, RR 18 O2sat 99% RA. EKG pending. Labs with WBC 10.48, 86% neutrophils, Hb 10.4, MCV 89.8, alb 3.2, , AST 51, CK neg. COVID neg. CXR without any acute infiltrate. CTH neg. CT L-spine with degenerative changes, severe scoliosis, neg fx, massive dilation of distal esophagus with food consistency materials vs. distended hiatal hernia. Pt given tylenol. Admitted to [facility] for further observation and management.  (27 Dec 2021 01:02)    Palliative consulted for assistance with GOC for geriatric pt with multiple falls. Pt seen and examined. Alert, oriented to name, month/year, place, situation. Soft speech. ROS as below. TC to pt's godson/caregiver Channing Gould to obtain history and explore GOC.     5 months ago, pt had been living with her adult daughter, who was pt's caregiver. Channing reports pt was not being fed adequately and was left in urine and feces. Pt's daughter  of drug overdose in 2021, at which point Channing and his grand daughter Francisco stepped in to help pt at home. Pt with complex grief, depressed, unwilling to leave couch, to use BR. Severe separation anxiety ("throws herself on floor when Channing tries to leave apt." Pt has gained "some weight" since Channing has been caring for patient. Pt with good appetite, feeds self, no witnessed aspiration events. Pt incontinent of BB for last 7 months.     PRESENT SYMPTOMS:   [ ]Unable to obtain due to poor mentation   Source if other than patient:  [ ]Family   [ ]Team     Pain:  Location :      sacral spine, wound   Quality: sharp  Radiation: no   Timing: "I don't know"   Aggravating factors: pressure   Current score (0-10 scale): 0/10  Improves with: Tylenol     PAIN AD Score:   [email or web address] (press ctrl +  left click to view)    If [ ], pt denies symptom.   Dyspnea:         [ ]Mild [ ]Moderate [ ]Severe  Anxiety:           [ ]Mild [ ]Moderate [ ]Severe  Fatigue:           [ x]Mild [ ]Moderate [ ]Severe  Nausea:           [ ]Mild [ ]Moderate [ ]Severe  Loss of appetite:     [ ]Mild [ ]Moderate [ ]Severe  Constipation:   [ ]Mild [ ]Moderate [ ]Severe  Grief Present   [x ] Yes   [ ] No   Other Symptoms:    [x ]All other review of systems negative     Opiate Naive (Y/N): Yes  iStop reviewed (Y/N): Yes.   No Rx found on iStop review (Ref#:    [id number]       PAST MEDICAL & SURGICAL HISTORY:  Asthma  HTN (hypertension)  High cholesterol  GERD (gastroesophageal reflux disease)  Hypothyroid  Depression  Primary biliary cirrhosis  Dementia  History of Nissen fundoplication  History of cholecystectomy  H/O knee surgery  S/P cataract surgery    FAMILY HISTORY:  Family history of CVA  mother    FH: myocardial infarction  father    SOCIAL HISTORY:   - Support system: [ ] strong [ ] adequate [x ] inadequate  - cared for her by her daughter Zora until daughter  of drug overdose 2021   - pt lives alone, god son Channing Gould and his gr daughter Francisco help care for pt in pt's home  - increased difficulty meeting pt's needs, ADLs    PSYCHOSOCIAL ASSESSMENT:  - Congregation/Spiritual practice: deferred   - Role of organized Adventism [ ] important [ ] some [ ] unable to assess dt pt mentation     - Coping: [ ] well [x ] with difficulty [ ] poor coping  - Existential distress, complex grief following death of daughter from overdose 2021    Allergies    Kiwi (Other)  No Known Drug Allergies  Nuts (Rash)  pitted fruits (Hives)  tree fruit (Unknown)  Tree Nuts (Unknown)    Intolerances    Medications:      MEDICATIONS  (STANDING):  acetaminophen     Tablet .. 650 milliGRAM(s) Oral every 6 hours  artificial tears (preservative free) Ophthalmic Solution 1 Drop(s) Both EYES two times a day  bisacodyl Suppository 10 milliGRAM(s) Rectal once  buDESOnide    Inhalation Suspension 0.5 milliGRAM(s) Inhalation two times a day  dextrose 40% Gel 15 Gram(s) Oral once  dextrose 5%. 1000 milliLiter(s) (50 mL/Hr) IV Continuous <Continuous>  dextrose 5%. 1000 milliLiter(s) (100 mL/Hr) IV Continuous <Continuous>  dextrose 50% Injectable 25 Gram(s) IV Push once  dextrose 50% Injectable 12.5 Gram(s) IV Push once  dextrose 50% Injectable 25 Gram(s) IV Push once  enoxaparin Injectable 30 milliGRAM(s) SubCutaneous every 24 hours  glucagon  Injectable 1 milliGRAM(s) IntraMuscular once  insulin lispro (ADMELOG) corrective regimen sliding scale   SubCutaneous three times a day before meals  levothyroxine Injectable 66 MICROGram(s) IV Push at bedtime  lidocaine   4% Patch 1 Patch Transdermal every 24 hours  pantoprazole  Injectable 40 milliGRAM(s) IV Push daily    MEDICATIONS  (PRN):    Labs:    CBC:                        10.5   6.33  )-----------( 241      ( 27 Dec 2021 08:16 )             32.9     CMP:        141  |  107  |  21  ----------------------------<  84  3.5   |  24  |  0.50    Ca    9.0      27 Dec 2021 08:03  Phos  2.8     12  Mg     2.0     12-    TPro  6.4  /  Alb  3.0<L>  /  TBili  0.6  /  DBili  x   /  AST  41<H>  /  ALT  38  /  AlkPhos  285<H>        RADIOLOGY & ADDITIONAL STUDIES:  Imaging:  Reviewed    < from: Xray Chest 2 Views PA/Lat (21 @ 22:51) >    Findings/  impression: Heart size within normal limits, thoracic aortic   calcification. Left basilar opacity. Stable bony structures, scoliosis.    < end of copied text >  < from: CT Head No Cont (21 @ 23:20) >    IMPRESSION: No acute intracranial hemorrhage or calvarial fracture.    < end of copied text >  < from: CT Lumbar Spine No Cont (21 @ 23:21) >    IMPRESSION:  1. Degenerative change and severe scoliosis.  2. No fracture.  3. There is massive dilatation the distal esophagus with food consistency   material versus a distended hiatal hernia.    < end of copied text >  < from: Xray Sacrum + Coccyx (21 @ 01:53) >  Findings/  impression: No acute fracture. Tubing overlying thepelvis.. Colonic and   rectal feces/impaction. Bilateral hip and lower lumbar spine degenerative   changes. Abdominal, pelvic and femoral vascular calcification.    < end of copied text >  < from: Xray Hip w/ Pelvis Min 5 Views, Bilateral (21 @ 01:53) >  Findings/  impression: Tubing overlying the pelvis. No acute fracture. Colonic and   rectal feces/impaction. Bilateral hip and lower lumbar spine degenerative   changes. Abdominal, pelvic and femoral vascular calcification.    < end of copied text >  < from: Xray Abdomen 1 View PORTABLE -Routine (Xray Abdomen 1 View PORTABLE -Routine .) (21 @ 06:53) >    Findings/  impression: Colonic feces no abnormal bowel dilatation or   pneumoperitoneum. Thoracolumbar marked S-shaped scoliosis, thoracic and   lumbar spine degenerative changes. Abdominal vascular calcification.   Tubing overlying the right lower quadrant and pelvis. Left basilar opacity      PROTEIN CALORIE MALNUTRITION PRESENT:  [ ] Yes  [ x] No  Albumin, Serum:  3.0 ()    [ ] PPSV2 < or = to 30%   [ ] significant weight loss    [ ] poor nutritional intake  [ ] catabolic state   [ ] anasarca       [ ] Artificial Nutrition    PEx:  T(C): 36.4 (21 @ 05:10), Max: 36.8 (21 @ 20:52)  HR: 60 (21 @ 09:03) (60 - 80)  BP: 166/92 (21 @ 09:03) (144/70 - 166/92)  RR: 18 (21 @ 09:03) (16 - 18)  SpO2: 95% (21 @ 09:03) (95% - 99%)  Wt(kg): --    GEN: frail elderly woman lying in bed, NAD  HEENT: atraumatic, trace temporal wasting, MMM  RESP: Regular rhythm, no accessory muscle use, CTAB, diminished bilat bases  CARD: No tachycardia, regular rhythm, s1/s2  GI: soft, non distended, non tender with light palpation, normoactive BSx4  EXTR: No cyanosis, No edema  NEURO: Alert, oriented name, [facility]< month/year, situation   PSYCH: tearful when reflecting on loss of her daughter   SKIN: stage I    Preadmit Karnofsky:  %             Current Karnofsky:     %    BMI:  Wt:  Cachexia (Y/N):     PALLIATIVE MEDICINE COORDINATION OF CARE DOCUMENTATION: [x] Inpatient Consult  Non-Face-to-Face prolonged service provided that relates to (face-to-face) care that has or will occur and ongoing patient management, including one or more of the following: - Reviewed documentation from other physicians and other health care professional services - Reviewed medical records and diagnostic / radiology study results - Coordination with patient's support system  ************************************************************************  MEDICATION REVIEW:  - See Medication List Above    ISTOP REFERENCE:   - no active Rxs   - PRN usage: NO PRN'S  ------------------------------------------------------------------------  COORDINATION OF CARE:  - Palliative Care consulted for: GOC / Symptom Management  - Patient (to be) assessed:  - Patient previously seen by Palliative Care service: NO    ADVANCE CARE PLANNING  - Code status: FULL  - MOLST reviewed in chart: NONE; None found on Alpha  - HCP/ Surrogate: NONE found on Alpha  - GOC documents: NONE found on Alpha  - HCP/ Living will/Other Advanced Directives in Alpha: NONE found on Alpha  ------------------------------------------------------------------------  CARE PROVIDER DOCUMENTATION:  - SW/CM notes: Remains medically active  -   -     PLAN OF CARE  - Known admissions in past year:  - Current admit date:  - LOS:  - LACE score:   - Current dispo plan: TO BE DETERMINED    21  ------------------------------------------------------------------------  - Time Spent/Chart reviewed: 31 Minutes [including time used to gather, review and transfer data]  - Start: 1120 a  - End: 1150a    Prolonged services rendered, as part of this patient's care provided by Palliative Medicine, include: i. chart review for provider and ancillary service documentation, ii. pertinent diagnostics including laboratory and imaging studies, iii. medication review including PRN use, iv. admission history including previous palliative care encounters and GOC notes, v. advance care planning documents including HCP and MOLST forms in Alpha. Part of Palliative Medicine extended evaluation and management also involves coordination of care with our IDT, the primary and consulting teams, and unit CM/SW and Hospice if eligible. Recommendations based on the information gathered and discussed are outlined in the A/P of Palliative notes. HPI:  82 yo F with PMH CVA x3, dementia (AOx3), HTN, HLD, COPD, asthma, hypothyroidism, and recurrent UTIs who p/w frequent falls and sacral/back pain x2d.     Lives with her godson/caregiver who has been taking care of her for over a decade. Majority of history obtained from caretaker Channing Gould, as pt is a poor historian. Per Channing, pt has had 3 falls in the last 24h, all witnessed and likely in setting of gait instability. Intermittently walks with a walker, but had a third fall  and since had been complaining of sacral/tailbone pain. He had her sit down but she repeatedly stood up d/t pain. Because the pain would not go away despite tylenol and rest, Channing sent her to the hospital. He states that she does not have a history of similar falls and is at baseline functional and mostly independent. For the last week he has hired other caregivers to watch after her. Has a history of recurrent UTIs as she wears adult diapers and does not notify anyone of when she has urinated. No recent changes to medication. At baseline has poor appetite and eats little. No documentation of COVID vaccination and pt does not recall. ROS otherwise negative. No other concerns or complaints.    On arrival, T 98.3, HR 80, /84, RR 18 O2sat 99% RA. EKG pending. Labs with WBC 10.48, 86% neutrophils, Hb 10.4, MCV 89.8, alb 3.2, , AST 51, CK neg. COVID neg. CXR without any acute infiltrate. CTH neg. CT L-spine with degenerative changes, severe scoliosis, neg fx, massive dilation of distal esophagus with food consistency materials vs. distended hiatal hernia. Pt given tylenol. Admitted to [facility] for further observation and management.  (27 Dec 2021 01:02)    Palliative consulted for assistance with GOC for geriatric pt with multiple falls. Pt seen and examined. Alert, oriented to name, month/year, place, situation. Soft speech. ROS as below. TC to pt's godson/caregiver Channing Gould to obtain history and explore GOC.     5 months ago, pt had been living with her adult daughter, who was pt's caregiver. Channing reports pt was not being fed adequately and was left in urine and feces. Pt's daughter  of drug overdose in 2021, at which point Channing and his grand daughter Francisco stepped in to help pt at home. Pt with complex grief, depressed, unwilling to leave couch, to use BR. Severe separation anxiety ("throws herself on floor when Channing tries to leave apt." Pt has gained "some weight" since Channing has been caring for patient. Pt with good appetite, feeds self, no witnessed aspiration events. Pt incontinent of BB for last 7 months.     PRESENT SYMPTOMS:   [ ]Unable to obtain due to poor mentation   Source if other than patient:  [ ]Family   [ ]Team     Pain:  Location :      sacral spine, wound   Quality: sharp  Radiation: no   Timing: "I don't know"   Aggravating factors: pressure   Current score (0-10 scale): 0/10  Improves with: Tylenol     PAIN AD Score:   [email or web address] (press ctrl +  left click to view)    If [ ], pt denies symptom.   Dyspnea:         [ ]Mild [ ]Moderate [ ]Severe  Anxiety:           [ ]Mild [ ]Moderate [ ]Severe  Fatigue:           [ x]Mild [ ]Moderate [ ]Severe  Nausea:           [ ]Mild [ ]Moderate [ ]Severe  Loss of appetite:     [ ]Mild [ ]Moderate [ ]Severe  Constipation:   [ ]Mild [ ]Moderate [ ]Severe  Grief Present   [x ] Yes   [ ] No   Other Symptoms:    [x ]All other review of systems negative     Opiate Naive (Y/N): Yes  iStop reviewed (Y/N): Yes.   No Rx found on iStop review (Ref#:    [id number]       PAST MEDICAL & SURGICAL HISTORY:  Asthma  HTN (hypertension)  High cholesterol  GERD (gastroesophageal reflux disease)  Hypothyroid  Depression  Primary biliary cirrhosis  Dementia  History of Nissen fundoplication  History of cholecystectomy  H/O knee surgery  S/P cataract surgery    FAMILY HISTORY:  Family history of CVA  mother    FH: myocardial infarction  father    SOCIAL HISTORY:   - Support system: [ ] strong [ ] adequate [x ] inadequate  - cared for her by her daughter Zora until daughter  of drug overdose 2021   - pt lives alone, god son Channing Gould and his gr daughter Francisco help care for pt in pt's home  - increased difficulty meeting pt's needs, ADLs    PSYCHOSOCIAL ASSESSMENT:  - Jehovah's witness/Spiritual practice: deferred   - Role of organized [employer] [ ] important [ ] some [ ] unable to assess dt pt mentation     - Coping: [ ] well [x ] with difficulty [ ] poor coping  - Existential distress, complex grief following death of daughter from overdose 2021    Allergies    Kiwi (Other)  No Known Drug Allergies  Nuts (Rash)  pitted fruits (Hives)  tree fruit (Unknown)  Tree Nuts (Unknown)    Intolerances    Medications:      MEDICATIONS  (STANDING):  acetaminophen     Tablet .. 650 milliGRAM(s) Oral every 6 hours  artificial tears (preservative free) Ophthalmic Solution 1 Drop(s) Both EYES two times a day  bisacodyl Suppository 10 milliGRAM(s) Rectal once  buDESOnide    Inhalation Suspension 0.5 milliGRAM(s) Inhalation two times a day  dextrose 40% Gel 15 Gram(s) Oral once  dextrose 5%. 1000 milliLiter(s) (50 mL/Hr) IV Continuous <Continuous>  dextrose 5%. 1000 milliLiter(s) (100 mL/Hr) IV Continuous <Continuous>  dextrose 50% Injectable 25 Gram(s) IV Push once  dextrose 50% Injectable 12.5 Gram(s) IV Push once  dextrose 50% Injectable 25 Gram(s) IV Push once  enoxaparin Injectable 30 milliGRAM(s) SubCutaneous every 24 hours  glucagon  Injectable 1 milliGRAM(s) IntraMuscular once  insulin lispro (ADMELOG) corrective regimen sliding scale   SubCutaneous three times a day before meals  levothyroxine Injectable 66 MICROGram(s) IV Push at bedtime  lidocaine   4% Patch 1 Patch Transdermal every 24 hours  pantoprazole  Injectable 40 milliGRAM(s) IV Push daily    MEDICATIONS  (PRN):    Labs:    CBC:                        10.5   6.33  )-----------( 241      ( 27 Dec 2021 08:16 )             32.9     CMP:        141  |  107  |  21  ----------------------------<  84  3.5   |  24  |  0.50    Ca    9.0      27 Dec 2021 08:03  Phos  2.8     12  Mg     2.0     12-    TPro  6.4  /  Alb  3.0<L>  /  TBili  0.6  /  DBili  x   /  AST  41<H>  /  ALT  38  /  AlkPhos  285<H>        RADIOLOGY & ADDITIONAL STUDIES:  Imaging:  Reviewed    < from: Xray Chest 2 Views PA/Lat (21 @ 22:51) >    Findings/  impression: Heart size within normal limits, thoracic aortic   calcification. Left basilar opacity. Stable bony structures, scoliosis.    < end of copied text >  < from: CT Head No Cont (21 @ 23:20) >    IMPRESSION: No acute intracranial hemorrhage or calvarial fracture.    < end of copied text >  < from: CT Lumbar Spine No Cont (21 @ 23:21) >    IMPRESSION:  1. Degenerative change and severe scoliosis.  2. No fracture.  3. There is massive dilatation the distal esophagus with food consistency   material versus a distended hiatal hernia.    < end of copied text >  < from: Xray Sacrum + Coccyx (21 @ 01:53) >  Findings/  impression: No acute fracture. Tubing overlying thepelvis.. Colonic and   rectal feces/impaction. Bilateral hip and lower lumbar spine degenerative   changes. Abdominal, pelvic and femoral vascular calcification.    < end of copied text >  < from: Xray Hip w/ Pelvis Min 5 Views, Bilateral (21 @ 01:53) >  Findings/  impression: Tubing overlying the pelvis. No acute fracture. Colonic and   rectal feces/impaction. Bilateral hip and lower lumbar spine degenerative   changes. Abdominal, pelvic and femoral vascular calcification.    < end of copied text >  < from: Xray Abdomen 1 View PORTABLE -Routine (Xray Abdomen 1 View PORTABLE -Routine .) (21 @ 06:53) >    Findings/  impression: Colonic feces no abnormal bowel dilatation or   pneumoperitoneum. Thoracolumbar marked S-shaped scoliosis, thoracic and   lumbar spine degenerative changes. Abdominal vascular calcification.   Tubing overlying the right lower quadrant and pelvis. Left basilar opacity      PROTEIN CALORIE MALNUTRITION PRESENT:  [ ] Yes  [ x] No  Albumin, Serum:  3.0 ()    [ ] PPSV2 < or = to 30%   [ ] significant weight loss    [ ] poor nutritional intake  [ ] catabolic state   [ ] anasarca       [ ] Artificial Nutrition    PEx:  T(C): 36.4 (21 @ 05:10), Max: 36.8 (21 @ 20:52)  HR: 60 (21 @ 09:03) (60 - 80)  BP: 166/92 (21 @ 09:03) (144/70 - 166/92)  RR: 18 (21 @ 09:03) (16 - 18)  SpO2: 95% (21 @ 09:03) (95% - 99%)  Wt(kg): --    GEN: frail elderly woman lying in bed, NAD  HEENT: atraumatic, trace temporal wasting, MMM  RESP: Regular rhythm, no accessory muscle use, CTAB, diminished bilat bases  CARD: No tachycardia, regular rhythm, s1/s2  GI: soft, non distended, non tender with light palpation, normoactive BSx4  EXTR: No cyanosis, No edema  NEURO: Alert, oriented name, [facility], month/year, situation   PSYCH: tearful when reflecting on loss of her daughter   SKIN: stage I sacral pressure ulcer     Preadmit Karnofsky:  60%             Current Karnofsky:    40 %    BMI: 14  Wt: 29  Cachexia (Y/N):  Yes     PALLIATIVE MEDICINE COORDINATION OF CARE DOCUMENTATION: [x] Inpatient Consult  Non-Face-to-Face prolonged service provided that relates to (face-to-face) care that has or will occur and ongoing patient management, including one or more of the following: - Reviewed documentation from other physicians and other health care professional services - Reviewed medical records and diagnostic / radiology study results - Coordination with patient's support system  ************************************************************************  MEDICATION REVIEW:  - See Medication List Above    ISTOP REFERENCE:   - no active Rxs   - PRN usage: NO PRN'S  ------------------------------------------------------------------------  COORDINATION OF CARE:  - Palliative Care consulted for: GO  - Patient (to be) assessed: 2021  - Patient previously seen by Palliative Care service: NO    ADVANCE CARE PLANNING  - Code status: FULL  - MOLST reviewed in chart: NONE; None found on Alpha  - Emergency contact: rodolfo Stockton he is pt's HCP and has paperwork   - GOC documents: NONE found on Alpha  - Rodolfo Snell, pt has living will   ------------------------------------------------------------------------  CARE PROVIDER DOCUMENTATION:  - SW/CM notes: Remains medically active  - PT: rec CHIDI vs HPT with  aid   - SS: overt signs & symptoms of airway protection deficits on thin liquid and puree trias during [id number] swallow evaluation, pending esophagram, tentatively EGD tomorrow    - Ortho: CT finding for non displaced s4 fracture, No indication for spine surgery.  Mobilize with assistance.  Weight bearing as tolerated with walker.  Followup in the office in 4-6 weeks  - GI: EGD pending esophagram and CTChest findings     PLAN OF CARE  - Known admissions in past year: two including current   - Current admit date:  2021  - LOS: 6 days   - LACE score: 9  - Current dispo plan: TO BE DETERMINED    21  ------------------------------------------------------------------------  - Time Spent/Chart reviewed: 31 Minutes [including time used to gather, review and transfer data]  - Start: 1120 a  - End: 1150a    Prolonged services rendered, as part of this patient's care provided by Palliative Medicine, include: i. chart review for provider and ancillary service documentation, ii. pertinent diagnostics including laboratory and imaging studies, iii. medication review including PRN use, iv. admission history including previous palliative care encounters and GOC notes, v. advance care planning documents including HCP and MOLST forms in [facility]. Part of Palliative Medicine extended evaluation and management also involves coordination of care with our IDT, the primary and consulting teams, and unit CM/SW and Hospice if eligible. Recommendations based on the information gathered and discussed are outlined in the A/P of Palliative notes.

## 2021-12-27 NOTE — SWALLOW VFSS/MBS ASSESSMENT ADULT - SLP PERTINENT HISTORY OF CURRENT PROBLEM
PMH CVA x3, dementia (AOx3), HTN, HLD, COPD, asthma, hypothyroidism, and recurrent UTIs who p/w frequent falls and sacral/back pain x2d. Admitted to [facility] for further observation and management..  Lumbar CT scan done during this admission showed "massive dilation of the distal esophagus with food consistency material vs distended hiatal hernia."  Pt also demonstrated overt signs & symptoms of airway protection deficits on thin liquid and puree trias during [id number] swallow evaluation this AM.

## 2021-12-27 NOTE — CONSULT NOTE ADULT - SUBJECTIVE AND OBJECTIVE BOX
Patient is a 81y old  Female who presents with a chief complaint of Falls (27 Dec 2021 07:15)       HPI:  Pt is an 80 yo F with PMH CVA x3, dementia (AOx3), HTN, HLD, COPD, asthma, hypothyroidism, and recurrent UTIs who p/w frequent falls and sacral/back pain x2d. Lives with her godson/caregiver who has been taking care of her for over a decade. Majority of history obtained from caretaker Channing Gould, as pt is a poor historian. Per Channing, pt has had 3 falls in the last 24h, all witnessed and likely in setting of gait instability. Intermittently walks with a walker, but had a third fall 12/26 and since had been complaining of sacral/tailbone pain. He had her sit down but she repeatedly stood up d/t pain. Because the pain would not go away despite tylenol and rest, Channing sent her to the hospital. He states that she does not have a history of similar falls and is at baseline functional and mostly independent. For the last week he has hired other caregivers to watch after her. Has a history of recurrent UTIs as she wears adult diapers and does not notify anyone of when she has urinated. No recent changes to medication. At baseline has poor appetite and eats little. No documentation of COVID vaccination and pt does not recall. ROS otherwise negative. No other concerns or complaints.    On arrival, T 98.3, HR 80, /84, RR 18 O2sat 99% RA. EKG pending. Labs with WBC 10.48, 86% neutrophils, Hb 10.4, MCV 89.8, alb 3.2, , AST 51, CK neg. COVID neg. CXR without any acute infiltrate. CTH neg. CT L-spine with degenerative changes, severe scoliosis, neg fx, massive dilation of distal esophagus with food consistency materials vs. distended hiatal hernia. Pt given tylenol. Admitted to [facility] for further observation and management.  (27 Dec 2021 01:02)      PAST MEDICAL & SURGICAL HISTORY:  Asthma    HTN (hypertension)    High cholesterol    GERD (gastroesophageal reflux disease)    Hypothyroid    Depression    Primary biliary cirrhosis    Dementia    History of Nissen fundoplication    History of cholecystectomy    H/O knee surgery    S/P cataract surgery        MEDICATIONS  (STANDING):  artificial tears (preservative free) Ophthalmic Solution 1 Drop(s) Both EYES two times a day  bisacodyl Suppository 10 milliGRAM(s) Rectal once  buDESOnide    Inhalation Suspension 0.5 milliGRAM(s) Inhalation two times a day  dextrose 40% Gel 15 Gram(s) Oral once  dextrose 5%. 1000 milliLiter(s) (50 mL/Hr) IV Continuous <Continuous>  dextrose 5%. 1000 milliLiter(s) (100 mL/Hr) IV Continuous <Continuous>  dextrose 50% Injectable 25 Gram(s) IV Push once  dextrose 50% Injectable 12.5 Gram(s) IV Push once  dextrose 50% Injectable 25 Gram(s) IV Push once  enoxaparin Injectable 30 milliGRAM(s) SubCutaneous every 24 hours  glucagon  Injectable 1 milliGRAM(s) IntraMuscular once  insulin lispro (ADMELOG) corrective regimen sliding scale   SubCutaneous three times a day before meals  levothyroxine Injectable 66 MICROGram(s) IV Push at bedtime  lidocaine   4% Patch 1 Patch Transdermal every 24 hours  pantoprazole  Injectable 40 milliGRAM(s) IV Push daily    MEDICATIONS  (PRN):      FAMILY HISTORY:  Family history of CVA  mother    FH: myocardial infarction  father        CBC Full  -  ( 27 Dec 2021 08:16 )  WBC Count : 6.33 K/uL  RBC Count : 3.67 M/uL  Hemoglobin : 10.5 g/dL  Hematocrit : 32.9 %  Platelet Count - Automated : 241 K/uL  Mean Cell Volume : 89.6 fl  Mean Cell Hemoglobin : 28.6 pg  Mean Cell Hemoglobin Concentration : 31.9 gm/dL  Auto Neutrophil # : x  Auto Lymphocyte # : x  Auto Monocyte # : x  Auto Eosinophil # : x  Auto Basophil # : x  Auto Neutrophil % : x  Auto Lymphocyte % : x  Auto Monocyte % : x  Auto Eosinophil % : x  Auto Basophil % : x      12-27    141  |  107  |  21  ----------------------------<  84  3.5   |  24  |  0.50    Ca    9.0      27 Dec 2021 08:03  Phos  2.8     12-27  Mg     2.0     12-27    TPro  6.4  /  Alb  3.0<L>  /  TBili  0.6  /  DBili  x   /  AST  41<H>  /  ALT  38  /  AlkPhos  285<H>  12-27            Radiology:    < from: Xray Chest 2 Views PA/Lat (12.26.21 @ 22:51) >  ACC: [id number] EXAM:  XR CHEST PA LAT 2V                          PROCEDURE DATE:  12/26/2021          INTERPRETATION:  Clinical history/reason for exam: Trauma.    Frontal and lateral.    COMPARISON: September 11, 2021.    Findings/  impression: Heart size within normal limits, thoracic aortic   calcification. Left basilar opacity. Stable bony structures, scoliosis.      < from: Xray Hip w/ Pelvis Min 5 Views, Bilateral (12.27.21 @ 01:53) >  ACC: [id number] EXAM:  XR HIPS BI WITH PELV MIN 5V                          PROCEDURE DATE:  12/27/2021          INTERPRETATION:  Clinical history/reason for exam: Trauma.    5 views.    Findings/  impression: Tubing overlying the pelvis. No acute fracture. Colonic and   rectal feces/impaction. Bilateral hip and lower lumbar spine degenerative   changes. Abdominal, pelvic and femoral vascular calcification.        < from: Xray Sacrum + Coccyx (12.27.21 @ 01:53) >    ACC: [id number] EXAM:  XR SACRUM COCCYX MIN 2 VIEWS                          PROCEDURE DATE:  12/27/2021          INTERPRETATION:  Clinical history/reason for exam: Trauma.    3 views.    Findings/  impression: No acute fracture. Tubing overlying thepelvis.. Colonic and   rectal feces/impaction. Bilateral hip and lower lumbar spine degenerative   changes. Abdominal, pelvic and femoral vascular calcification.                Vital Signs Last 24 Hrs  T(C): 36.4 (27 Dec 2021 05:10), Max: 36.8 (26 Dec 2021 20:52)  T(F): 97.6 (27 Dec 2021 05:10), Max: 98.3 (26 Dec 2021 20:52)  HR: 60 (27 Dec 2021 09:03) (60 - 80)  BP: 166/92 (27 Dec 2021 09:03) (144/70 - 166/92)  BP(mean): --  RR: 18 (27 Dec 2021 09:03) (16 - 18)  SpO2: 95% (27 Dec 2021 09:03) (95% - 99%)        REVIEW OF SYSTEMS: s/ fall at home, back pain        Physical Exam:   cachectic 80 yo  woman lying in semi mclean's position, awake, no acute complaints    Head: normocephalic, atraumatic    Eyes: PERRLA, EOMI, no nystagmus, sclera anicteric    ENT: nasal discharge, uvula midline, no oropharyngeal erythema/exudate    Neck: supple, negative JVD, negative carotid bruits, no thyromegaly    Chest: CTA bilaterally, neg wheeze/ rhonchi/ rales/ crackles/ egophany    Cardiovascular: regular rate and rhythm, neg murmurs/rubs/gallops    Abdomen: soft, non distended, non tender to palpation in all 4 quadrants, negative rebound/guarding, normal bowel sounds    Extremities: WWP, neg cyanosis/clubbing/edema, negative calf tenderness to palpation, negative Brigid's sign    Musculoskeletal:  sarcopenic    Neurologic Exam:    Alert and oriented x 3    Motor Exam:    Right UE:             no focal weakness > 3+/5 throughout    Left UE:              no focal weakness > 3+/5 throughout    Right LE:             no focal weakness > 3+/5 throughout    Left LE:               no focal weakness > 3+/5 throughout               Sensation:           intact to light touch x 4 extremities                                               DTR:                  biceps/brachioradialis: equal                                                       patella/ankle: equal                                Gait:  not tested        PM&R Impression:    1) deconditioned  2) no focal weakness    Recommendations/ Plan :    1) Physical therapy focusing on therapeutic exercises, bed mobility/transfer out of bed evaluation, progressive ambulation with assistive devices prn.    2) Anticipated Disposition Plan/Recs:    subacute rehab placement

## 2021-12-27 NOTE — PROGRESS NOTE ADULT - PROBLEM SELECTOR PLAN 9
- pt with hx HTN, diet controlled  - /84 on arrival likely in setting of sacral pain  - continue to monitor    #HLD  pt with hx HLD, on atorvastatin 20mg outpt  - c/w home med

## 2021-12-27 NOTE — H&P ADULT - PROBLEM SELECTOR PLAN 11
- on arrival, Hb 10.4 with MCV 89.8  - check iron, folate/b12, TSH  - no active s/s bleeding  - keep active T&S, transfuse Hb<7 - pt with hx HLD, on atorvastatin 20mg outpt  - c/w home med    #hypothyroidism  - pt with hx hypothyroidism, on synthroid 88mcg outpt  - c/w home med  - check TSH

## 2021-12-27 NOTE — H&P ADULT - ATTENDING COMMENTS
reviewed pertinent data , h&p  patient seen and examined overnight   a/f falls, weakness     PE as above, pt cooperative and follows commands, in addition pt w/ 3/5 hand  b/l, 3/5 lower ext motor strength b/l     1. falls/ FTT: PT and nutrition evaluation  2. GI consult re: esophageal dilatation findings on CT  3. followup lung nodule as outpatient        rest of  plan as above

## 2021-12-27 NOTE — H&P ADULT - PROBLEM SELECTOR PLAN 4
- per caregiver, pt with hx recurrent UTIs, uses adult diapers and does not notify caregiver need for changing   - chart review with 10/2020 and 9/2021 UCx +klebsiella s/t CTX  - check UA and UCx - CT L-spine as above  - pt without c/o choking sensation, reflux, or vomiting  - NPO for now  - S+S eval  - consider GI consult

## 2021-12-27 NOTE — DIETITIAN INITIAL EVALUATION ADULT. - OTHER CALCULATIONS
IBW used to calculate energy needs due to pt's current body weight <68% IBW, IBW used per clinical judgment to maximize nutrition. Needs adjusted for age and wt, severe malnutrition, PU

## 2021-12-27 NOTE — DIETITIAN INITIAL EVALUATION ADULT. - OTHER INFO
82 yo F with PMH CVA x3, dementia (AOx3), HTN, HLD, COPD, asthma, hypothyroidism, and recurrent UTIs who p/w frequent falls and sacral/back pain x 2 days, found to have a non-displaced fracture of S4, CT lumbar imaging capturing dilated esophagus with food remnants vs. distended hiatal hernia, GI consulted for abnormal CT findings, considering EGD for 12/28. S+S eval 12/27, noted pharyngeal deficits, rec cont NPO, pending MBS results. Palliative following    On assessment, pt seen resting in bed. Per pt report, UBW @ 120 lbs but this is many years ago. Unable to recall current UBW, but reports weighing 75 lbs early this year, current dosing wt of 64 lbs. Per wt hx in EMR, wt fluctuating between  lbs since July 2019, weighing at 100 lbs in Oct 2020, last weighed at 89 lbs in Sept 2021. Indicates wt loss of 36 lbs/36% since 14 months ago. She endorses poor appetite but unable to recall when lack of appetite first started. Per prev RD note at prev adm in Sept 2021, infers lack of appetite that started in August d/t daughters passing. Reports eating when she feels like eating but unable to provide detail diet hx. NFPE performed, noted severe fat/muscle wasting. Pt NPO at time of assessment, pending S+S eval, now rec cont NPO pending MBS results. Noted sacral pain, n/v. GI: WDL per flowsheet. Skin: Joel 13, no edema noted, PU stg 1 to sacrum. RD to follow per protocol. Please see below for nutr recs.

## 2021-12-27 NOTE — H&P ADULT - PROBLEM SELECTOR PLAN 2
- CT L-spine as above  - pt without c/o choking sensation, reflux, or vomiting  - NPO for now  - S+S eval  - consider GI consult ADD: BMI 19, consult nutrition. rest of plan as above

## 2021-12-27 NOTE — PROGRESS NOTE ADULT - ASSESSMENT
Pt is an 82 yo F with PMH CVA x3, dementia (AOx3), HTN, HLD, COPD, asthma, hypothyroidism, and recurrent UTIs who p/w frequent falls and sacral/back pain x2d. Admitted to [facility] for further observation and management. 80 yo F with PMH CVA x3, dementia (AOx2-3), HTN, HLD, COPD, asthma, hypothyroidism, and recurrent UTIs who p/w frequent falls and sacral pain, found to have sacral fx and esophageal dilation.

## 2021-12-27 NOTE — DIETITIAN INITIAL EVALUATION ADULT. - PROBLEM SELECTOR PLAN 6
ADD:  1.3 cm nodular parenchymal opacity in the posterior right lower lung seen on CT Lumbar spine, will need further workup as outpatient

## 2021-12-27 NOTE — H&P ADULT - NSHPPHYSICALEXAM_GEN_ALL_CORE
VITAL SIGNS:  T(C): 36.8 (12-26-21 @ 20:52), Max: 36.8 (12-26-21 @ 20:52)  T(F): 98.3 (12-26-21 @ 20:52), Max: 98.3 (12-26-21 @ 20:52)  HR: 80 (12-26-21 @ 20:52) (80 - 80)  BP: 162/84 (12-26-21 @ 20:52) (162/84 - 162/84)  BP(mean): --  RR: 18 (12-26-21 @ 20:52) (18 - 18)  SpO2: 99% (12-26-21 @ 20:52) (99% - 99%)  Wt(kg): --    PHYSICAL EXAM:  Constitutional: frail elderly F, underweight, resting comfortably in bed; NAD  Head: NC/AT  Eyes: PERRL, EOMI, anicteric sclera; slight conjunctival erythema  ENT: no nasal discharge; MMM  Neck: supple; no JVD  Respiratory: CTA B/L; no W/R/R  Cardiac: +S1/S2; RRR; no M/R/G  Gastrointestinal: soft, scaphoid, NT/ND; no rebound or guarding; +BSx4  Extremities: WWP, no clubbing or cyanosis; no peripheral edema; significant mm wasting  Musculoskeletal: NROM x4; no joint swelling, tenderness or erythema  Vascular: 2+ radial, DP/PT pulses B/L  Dermatologic: skin warm, dry and intact; no rashes, wounds, or scars  Neurologic: AAOx3; CNII-XII grossly intact; no focal deficits  Psychiatric: poor historian, odd affect

## 2021-12-27 NOTE — CONSULT NOTE ADULT - PROBLEM SELECTOR RECOMMENDATION 5
.  CT L-spine massive dilation of distal esophagus with food consistency materials vs. distended hiatal hernia  - SS eval, GI appreciate recs  - MBS and esophagram pending

## 2021-12-27 NOTE — CONSULT NOTE ADULT - PROBLEM SELECTOR RECOMMENDATION 6
.  Moderate FAST 6. Incontinent BB for ~7 months. Ambulatory with walker, but depressed so generally refuses to ambulate, move from cough. Sacral 1 ulcer. Falls, progressive debilitation. Feeds self but needs assistance with meal set up  - does not meet hospice criteria  - C as above with Channing Gould

## 2021-12-27 NOTE — PATIENT PROFILE ADULT - FALL HARM RISK - HARM RISK INTERVENTIONS

## 2021-12-27 NOTE — CONSULT NOTE ADULT - ASSESSMENT
82 yo F with PMH CVA x3, dementia (AOx3), HTN, HLD, COPD, asthma, hypothyroidism, and recurrent UTIs who p/w frequent falls and sacral/back pain x 2 days, found to have a non-displaced fracture of S4, CT lumbar imaging capturing dilated esophagus with food remnants, GI consulted for abnormal CT findings.    #Dilated esophagus  Presenting to [facility] ED with c/o sacral/back pain in the setting of recent fall, found to have a non-displaced fracture of S4, with lumbar imaging capturing dilated esophagus with food remnants, unclear etiology. S&S evaluation with both pharyngeal and esophageal phase deficits, with coughing on intake of thin liquids, concerning for aspiration. Ddx including achalasia vs pseudoachalasia (in the setting of prior nissen fundoplication surgery vs ?malignancy)  -Keep NPO for now  -Recommend esophagram to further assess esophageal anatomy  -Recommend CT Chest w/ IV contrast   -Can tentatively consider for EGD tomorrow, 12/28 pending above noted workup   -Please obtain AM coags, 12/28    INCOMPLETE NOTE, PENDING DISCUSSION WITH SVC ATTENDING 80 yo F with PMH CVA x3, dementia (AOx3), HTN, HLD, COPD, asthma, hypothyroidism, and recurrent UTIs who p/w frequent falls and sacral/back pain x 2 days, found to have a non-displaced fracture of S4, CT lumbar imaging capturing dilated esophagus with food remnants, GI consulted for abnormal CT findings.    #Dilated esophagus  Presenting to [facility] ED with c/o sacral/back pain in the setting of recent fall, found to have a non-displaced fracture of S4, with lumbar imaging capturing dilated esophagus with food remnants, unclear etiology. S&S evaluation with both pharyngeal and esophageal phase deficits, with coughing on intake of thin liquids, concerning for aspiration. Ddx including achalasia vs pseudoachalasia (in the setting of prior nissen fundoplication surgery vs ?malignancy)  -Keep NPO for now  -Recommend esophagram to further assess esophageal anatomy  -Recommend CT Chest w/ IV contrast   -Can tentatively consider for EGD tomorrow, 12/28 pending above noted workup   -Please obtain AM coags, 12/28    [place] [facility] [place].     Nora Bhatt DO  Gastroenterology Fellow  Pager: [phone]

## 2021-12-27 NOTE — CONSULT NOTE ADULT - SUBJECTIVE AND OBJECTIVE BOX
Orthopaedic Consult Note    HPI  81F pmhx dementia (A&Ox2-3), frequent falls, scoliosis, HTN, COPD, admitted to medicine overnight for frequent falls / low back pain. Pt is a poor historian , cannot recall when her most recent fall was but stated that when she fell last she landed on her behind. She reports low back pain that is non radiating. She denies numbness/tingling of bilateral lower extremities. Denies saddle paresthesias or episodes of retention or incontinence apart from baseline (per primary team patient has episodes of incontinence at baseline). Pt worked with PT just prior to consult, ambulated 50 feet with a walker, but patient could not recall the session to relay her pain tolerance at that time.   Ortho consulted for CT finding of nondisplaced S4 Fracture     PAST MEDICAL & SURGICAL HISTORY:  Asthma    HTN (hypertension)    High cholesterol    GERD (gastroesophageal reflux disease)    Hypothyroid    Depression    Primary biliary cirrhosis    Dementia    History of Nissen fundoplication    History of cholecystectomy    H/O knee surgery    S/P cataract surgery      Allergies    Kiwi (Other)  No Known Drug Allergies  Nuts (Rash)  pitted fruits (Hives)  tree fruit (Unknown)  Tree Nuts (Unknown)    Intolerances      MEDICATIONS  (STANDING):  acetaminophen     Tablet .. 650 milliGRAM(s) Oral every 6 hours  artificial tears (preservative free) Ophthalmic Solution 1 Drop(s) Both EYES two times a day  bisacodyl Suppository 10 milliGRAM(s) Rectal once  buDESOnide    Inhalation Suspension 0.5 milliGRAM(s) Inhalation two times a day  dextrose 40% Gel 15 Gram(s) Oral once  dextrose 5%. 1000 milliLiter(s) (50 mL/Hr) IV Continuous <Continuous>  dextrose 5%. 1000 milliLiter(s) (100 mL/Hr) IV Continuous <Continuous>  dextrose 50% Injectable 25 Gram(s) IV Push once  dextrose 50% Injectable 12.5 Gram(s) IV Push once  dextrose 50% Injectable 25 Gram(s) IV Push once  enoxaparin Injectable 30 milliGRAM(s) SubCutaneous every 24 hours  glucagon  Injectable 1 milliGRAM(s) IntraMuscular once  insulin lispro (ADMELOG) corrective regimen sliding scale   SubCutaneous three times a day before meals  levothyroxine Injectable 66 MICROGram(s) IV Push at bedtime  lidocaine   4% Patch 1 Patch Transdermal every 24 hours  pantoprazole  Injectable 40 milliGRAM(s) IV Push daily      Vital Signs Last 24 Hrs  T(C): 36.4 (27 Dec 2021 05:10), Max: 36.8 (26 Dec 2021 20:52)  T(F): 97.6 (27 Dec 2021 05:10), Max: 98.3 (26 Dec 2021 20:52)  HR: 85 (27 Dec 2021 10:55) (60 - 85)  BP: 181/94 (27 Dec 2021 10:55) (144/70 - 181/94)  BP(mean): --  RR: 18 (27 Dec 2021 09:03) (16 - 18)  SpO2: 96% (27 Dec 2021 10:55) (94% - 99%)    Physical Exam: Pt laying comfortably in bed, NAD.  A&Ox3 however has difficulty recalling history   Skin warm and well perfused throughout, developing right sided sacral wound assessed with nurse (primary team to address) - no point tenderness throughout spine or paraspinally   EHL/FHL/TA/GS/Quad 5/5 bilateral lower extremities  Ham 3/3 bilateral lower extremities  SLT in tact to distal bilateral lower extremities  DP pulses palpable bilaterally                             10.5   6.33  )-----------( 241      ( 27 Dec 2021 08:16 )             32.9     12-27    141  |  107  |  21  ----------------------------<  84  3.5   |  24  |  0.50    Ca    9.0      27 Dec 2021 08:03  Phos  2.8     12-27  Mg     2.0     12-27    TPro  6.4  /  Alb  3.0<L>  /  TBili  0.6  /  DBili  x   /  AST  41<H>  /  ALT  38  /  AlkPhos  285<H>  12-27    Imaging:  CT LUMBAR SPINE                        PROCEDURE DATE:  12/26/2021        INTERPRETATION:  IGus MD, have reviewed the images;   agree with the preliminary reported findings, issuing the following final   attestation and attending  IMPRESSION:      1. Agree: No compression deformity or acute/displaced fracture OF THE   LUMBAR SPINE.    ***2. *CORRECTION*:  There is SUBTLE AND NONDISPLACED FRACTURE that traverses through the S4   segment of the SACRUM, subtly seen as areas of cortical stepoff on series   7, images 33-40. Sacral height and SI joints are preserved.    3. Degenerative disc disease is most visible at the L3-4 level with mild   anterolisthesis, disc bulge as well as facet arthrosis and ligamentum   flavum thickening with a mild to moderate degree of spinal stenosis and   right asymmetric neural foraminal narrowing.        A/P: 81yFemale w/ nondisplaced S4 fracture   Dr. Arthur review of images pending  WBAT  Pain control as needed  No acute orthopaedic intervention at this time       Orthopaedic Consult Note    HPI  81F pmhx dementia (A&Ox2-3), frequent falls, scoliosis, HTN, COPD, admitted to medicine overnight for frequent falls / low back pain. Pt is a poor historian , cannot recall when her most recent fall was but stated that when she fell last she landed on her behind. She reports low back pain that is non radiating. She denies numbness/tingling of bilateral lower extremities. Denies saddle paresthesias or episodes of retention or incontinence apart from baseline (per primary team patient has episodes of incontinence at baseline). Pt worked with PT just prior to consult, ambulated 50 feet with a walker, but patient could not recall the session to relay her pain tolerance at that time.   Ortho consulted for CT finding of nondisplaced S4 Fracture     PAST MEDICAL & SURGICAL HISTORY:  Asthma    HTN (hypertension)    High cholesterol    GERD (gastroesophageal reflux disease)    Hypothyroid    Depression    Primary biliary cirrhosis    Dementia    History of Nissen fundoplication    History of cholecystectomy    H/O knee surgery    S/P cataract surgery      Allergies    Kiwi (Other)  No Known Drug Allergies  Nuts (Rash)  pitted fruits (Hives)  tree fruit (Unknown)  Tree Nuts (Unknown)    Intolerances      MEDICATIONS  (STANDING):  acetaminophen     Tablet .. 650 milliGRAM(s) Oral every 6 hours  artificial tears (preservative free) Ophthalmic Solution 1 Drop(s) Both EYES two times a day  bisacodyl Suppository 10 milliGRAM(s) Rectal once  buDESOnide    Inhalation Suspension 0.5 milliGRAM(s) Inhalation two times a day  dextrose 40% Gel 15 Gram(s) Oral once  dextrose 5%. 1000 milliLiter(s) (50 mL/Hr) IV Continuous <Continuous>  dextrose 5%. 1000 milliLiter(s) (100 mL/Hr) IV Continuous <Continuous>  dextrose 50% Injectable 25 Gram(s) IV Push once  dextrose 50% Injectable 12.5 Gram(s) IV Push once  dextrose 50% Injectable 25 Gram(s) IV Push once  enoxaparin Injectable 30 milliGRAM(s) SubCutaneous every 24 hours  glucagon  Injectable 1 milliGRAM(s) IntraMuscular once  insulin lispro (ADMELOG) corrective regimen sliding scale   SubCutaneous three times a day before meals  levothyroxine Injectable 66 MICROGram(s) IV Push at bedtime  lidocaine   4% Patch 1 Patch Transdermal every 24 hours  pantoprazole  Injectable 40 milliGRAM(s) IV Push daily      Vital Signs Last 24 Hrs  T(C): 36.4 (27 Dec 2021 05:10), Max: 36.8 (26 Dec 2021 20:52)  T(F): 97.6 (27 Dec 2021 05:10), Max: 98.3 (26 Dec 2021 20:52)  HR: 85 (27 Dec 2021 10:55) (60 - 85)  BP: 181/94 (27 Dec 2021 10:55) (144/70 - 181/94)  BP(mean): --  RR: 18 (27 Dec 2021 09:03) (16 - 18)  SpO2: 96% (27 Dec 2021 10:55) (94% - 99%)    Physical Exam: Pt laying comfortably in bed, NAD.  A&Ox3 however has difficulty recalling history   Skin warm and well perfused throughout, developing right sided sacral wound assessed with nurse (primary team to address) - no point tenderness throughout spine or paraspinally   EHL/FHL/TA/GS/Quad 5/5 bilateral lower extremities  Ham 3/3 bilateral lower extremities  SLT in tact to distal bilateral lower extremities  DP pulses palpable bilaterally                             10.5   6.33  )-----------( 241      ( 27 Dec 2021 08:16 )             32.9     12-27    141  |  107  |  21  ----------------------------<  84  3.5   |  24  |  0.50    Ca    9.0      27 Dec 2021 08:03  Phos  2.8     12-27  Mg     2.0     12-27    TPro  6.4  /  Alb  3.0<L>  /  TBili  0.6  /  DBili  x   /  AST  41<H>  /  ALT  38  /  AlkPhos  285<H>  12-27    Imaging:  CT LUMBAR SPINE                        PROCEDURE DATE:  12/26/2021        INTERPRETATION:  IGus MD, have reviewed the images;   agree with the preliminary reported findings, issuing the following final   attestation and attending  IMPRESSION:      1. Agree: No compression deformity or acute/displaced fracture OF THE   LUMBAR SPINE.    ***2. *CORRECTION*:  There is SUBTLE AND NONDISPLACED FRACTURE that traverses through the S4   segment of the SACRUM, subtly seen as areas of cortical stepoff on series   7, images 33-40. Sacral height and SI joints are preserved.    3. Degenerative disc disease is most visible at the L3-4 level with mild   anterolisthesis, disc bulge as well as facet arthrosis and ligamentum   flavum thickening with a mild to moderate degree of spinal stenosis and   right asymmetric neural foraminal narrowing.        A/P: 81yFemale w/ nondisplaced S4 fracture   Dr. Arthur review of images pending - discussed with Dr. Arthur  WBAT with walker  Pain control as needed  No acute orthopaedic intervention at this time, no further imaging required

## 2021-12-27 NOTE — CONSULT NOTE ADULT - PROBLEM SELECTOR RECOMMENDATION 3
.  fecal impaction Xr (12/27). no documented BM  - agree enema today  - start standing MLax qAM   - hold for loose stool  - encourage OOB as able

## 2021-12-27 NOTE — PROGRESS NOTE ADULT - PROBLEM SELECTOR PLAN 7
- per caregiver, pt with hx recurrent UTIs, uses adult diapers and does not notify caregiver need for changing   - chart review with 10/2020 and 9/2021 UCx +klebsiella s/t CTX  - check UA and UCx

## 2021-12-27 NOTE — PHYSICAL THERAPY INITIAL EVALUATION ADULT - ADDITIONAL COMMENTS
Patient unable to provide Social Hx. As per H&P patient lives with caretaker/godson, also has home health aid. Patient has multiple Hx of recent falls.

## 2021-12-27 NOTE — H&P ADULT - PROBLEM SELECTOR PLAN 5
- BMI 19.1  - albumin 3.2  - nutrition consult  - when tolerating PO, would start ensure supplement BID and MV - CT L-spine with some stool burden  - pt does not recall last BM but admits to passing gas  - would start bowel regimen once able to tolerate PO  - continue to monitor  - may be contributing to recurrent UTIs, as below

## 2021-12-27 NOTE — CONSULT NOTE ADULT - PROBLEM SELECTOR RECOMMENDATION 2
.  endorses feeling worthless, hopeless. prefers to stay home rather than doing new things. pt's daughter, who used to care for pt,  of drug overdose 2021.   - recommend starting low dose lexapro 5 mg PO qHS  - follow up with PCP   - emotional support provided

## 2021-12-27 NOTE — CONSULT NOTE ADULT - CONVERSATION DETAILS
Pts chart reviewed. Pt with capacity to make needs known, but does not demonstrate capacity to make complex medical decisions. TC to pt's caregiver, and per chart review HCP, Channing Gould.     Discussed medical updates and recent developments including SS eval and demonstrated overt s&s of airway protection deficits, esophagram pending.     Channing understands that pt has dementia, a progressive disease. We discussed that pts functional (ambulatory with walker) and cognitive statuses (aox3) do not qualify pt for home hospice. Discussed signs of decline to look for that would warrant a home hospice referral; pt would be a good candidate for house calls program. Channing is looking for extra help/additional resources to continue caring for patient at home and is supportive of pt going to [facility].     He states that pt has a completed Living Will and HCP. Encouraged him to provide to staff so they can be scanned into chart. Code status discussed. Channing feels that in event of card/resp arrest she would want to allow a natural death, but he would like to deliberate further with his grand daughter, who is also very close with patient, before completing a MOLST. Continue Full code and proceed with other interventions.

## 2021-12-27 NOTE — H&P ADULT - PROBLEM SELECTOR PLAN 9
- pt with hx COPD and asthma, on ventolin and pulmicort outpt  - c/w home meds - pt with hx dementia, baseline AOx2-3  - exam with AOx4 (self, place, time, president) however answers questions inappropriately at times and poor historian

## 2021-12-27 NOTE — DIETITIAN INITIAL EVALUATION ADULT. - PERSON TAUGHT/METHOD
Encourage adequate PO intake with ONS to supplement when diet adv, importance of meeting EER needs/verbal instruction/patient instructed

## 2021-12-27 NOTE — PHYSICAL THERAPY INITIAL EVALUATION ADULT - PERTINENT HX OF CURRENT PROBLEM, REHAB EVAL
Pt is an 80 yo F with PMH CVA x3, dementia (AOx3), HTN, HLD, COPD, asthma, hypothyroidism, and recurrent UTIs who p/w frequent falls and sacral/back pain x2d.

## 2021-12-27 NOTE — H&P ADULT - PROBLEM/PLAN-4
Detail Level: Detailed Add 84036 Cpt? (Important Note: In 2017 The Use Of 65754 Is Being Tracked By Cms To Determine Future Global Period Reimbursement For Global Periods): no DISPLAY PLAN FREE TEXT

## 2021-12-27 NOTE — H&P ADULT - PROBLEM SELECTOR PLAN 6
- pt with hx dementia, baseline AOx2-3  - exam with AOx4 (self, place, time, president) however answers questions inappropriately at times and poor historian ADD:  1.3 cm nodular parenchymal opacity in the posterior right lower lung seen on CT Lumbar spine, will need further workup as outpatient

## 2021-12-27 NOTE — PROGRESS NOTE ADULT - PROBLEM SELECTOR PLAN 3
seen on CT LS,   - PT evaluation #Protein calorie malnutrition  BMI 19. albumin 3.2  - consult nutrition. rest of plan as above  - nutrition consult  - when tolerating PO, would start ensure supplement BID and MV #Protein calorie malnutrition  BMI 19. albumin 3.2  - consult nutrition. rest of plan as above  - nutrition consult  - when tolerating PO, would start ensure supplement BID and MV  - palliative consulted, appreciate recs

## 2021-12-27 NOTE — H&P ADULT - ASSESSMENT
Pt is an 80 yo F with PMH CVA x3, dementia (AOx3), HTN, HLD, COPD, asthma, hypothyroidism, and recurrent UTIs who p/w frequent falls and sacral/back pain x2d. Admitted to [facility] for further observation and management.

## 2021-12-27 NOTE — CONSULT NOTE ADULT - CONSULT REASON
low back pain / sacral fracture
Dilated esophagus
Rehab evaluation
geriatric pt with multiple falls, dementia. exploring GOC.

## 2021-12-27 NOTE — DIETITIAN INITIAL EVALUATION ADULT. - PROBLEM SELECTOR PLAN 1
- pt p/w 2d multiple mechanical falls and back/sacral pain; no history of similar issue in the past; baseline ambulates with walker but intermittently non-compliant; lives with caretaker/eliu Gould but has had private aides at home for the past week; caregiver concerned that he can't provide adequate care and pt may need CHIDI  - falls have been witnessed, most likely mechanical in nature; intermittently compliant with walker  - pt with full recollection of falls, states most recent fall related to walker use and grabbing it incorrectly and losing her balance; no urinary or bowel incontinence or LOC c/f seizure activity  - f/u EKG  - CTH neg  - CT L-spine with degenerative changes, severe scoliosis, neg fx, massive dilation of distal esophagus with food consistency materials vs. distended hiatal hernia  - XR hip/pelvis/sacrum pending  - neuro exam benign  - PT eval  - fall precautions

## 2021-12-27 NOTE — PROGRESS NOTE ADULT - PROBLEM SELECTOR PLAN 1
- pt p/w 2d multiple mechanical falls and back/sacral pain; no history of similar issue in the past; baseline ambulates with walker but intermittently non-compliant; lives with caretaker/eliu Gould but has had private aides at home for the past week; caregiver concerned that he can't provide adequate care and pt may need CHIDI  - falls have been witnessed, most likely mechanical in nature; intermittently compliant with walker  - pt with full recollection of falls, states most recent fall related to walker use and grabbing it incorrectly and losing her balance; no urinary or bowel incontinence or LOC c/f seizure activity  - f/u EKG  - CTH neg  - CT L-spine with degenerative changes, severe scoliosis, neg fx, massive dilation of distal esophagus with food consistency materials vs. distended hiatal hernia  - XR hip/pelvis/sacrum pending  - neuro exam benign  - PT eval  - fall precautions Pt p/w 2d multiple mechanical falls and back/sacral pain; no history of similar issue in the past; baseline ambulates with walker but intermittently non-compliant; lives with caretaker/eliu Gould but has had private aides at home for the past week; caregiver concerned that he can't provide adequate care and pt may need CHIDI. Falls have been witnessed, most likely mechanical in nature; intermittently compliant with walker. Pt states most recent fall related to walker use and grabbing it incorrectly and losing her balance; no urinary or bowel incontinence or LOC c/f seizure activity. CTH neg. neuro exam benign. CT L-spine with severe scoliosis, subtle and non-displaced S4-sacrum fx  - PT eval  - fall precautions  - f/u B12, folate, TSH

## 2021-12-27 NOTE — PROGRESS NOTE ADULT - PROBLEM SELECTOR PLAN 6
1.3 cm nodular parenchymal opacity in the posterior right lower lung seen on CT Lumbar spine, will need further workup as outpatient - CT L-spine with some stool burden  - start bowel regimen  - continue to monitor  - may be contributing to recurrent UTIs, as below

## 2021-12-27 NOTE — DIETITIAN INITIAL EVALUATION ADULT. - PROBLEM SELECTOR PLAN 4
- CT L-spine as above  - pt without c/o choking sensation, reflux, or vomiting  - NPO for now  - S+S eval  - consider GI consult

## 2021-12-27 NOTE — H&P ADULT - PROBLEM SELECTOR PLAN 7
- pt with hx HTN, diet controlled  - /84 on arrival likely in setting of sacral pain  - continue to monitor - per caregiver, pt with hx recurrent UTIs, uses adult diapers and does not notify caregiver need for changing   - chart review with 10/2020 and 9/2021 UCx +klebsiella s/t CTX  - check UA and UCx

## 2021-12-27 NOTE — SWALLOW BEDSIDE ASSESSMENT ADULT - SLP GENERAL OBSERVATIONS
Received awake in bed, awoke to verbal/tactile cues, A&Ox3, breathing comfortably on RA. Denies hx of dysphagia.

## 2021-12-27 NOTE — DIETITIAN INITIAL EVALUATION ADULT. - ADD RECOMMEND
1. NPO, diet adv when medically feasible, consistency per SLP recs. >>Rec include Ensure Enlive BID (700 kcal, 40g protein, 360 mL free H2O) 2. Pending diet adv, monitor %PO intake 3. Align with GOC 4. BM and pain regimen per team 5. Monitor BMP, BG q6hrs, lytes, replete prn 6. Rec include MVI, Vit C, zinc to optimize nutrition and wound healing

## 2021-12-27 NOTE — CONSULT NOTE ADULT - ATTENDING COMMENTS
Herminia Razo is a 81 year old female with h/o frequent falls found to have sacral fracture.  She is at her neurologic baseline.  No indication for spine surgery.  Mobilize with assistance.  Weight bearing as tolerated with walker.  Followup in the office in 4-6 weeks ([address] [place] [address], [place] [address].  phone: [phone]).
Patient was seen and discussed with the GI fellow as noted above

## 2021-12-27 NOTE — PROGRESS NOTE ADULT - SUBJECTIVE AND OBJECTIVE BOX
OVERNIGHT EVENTS: MELONIE    SUBJECTIVE / INTERVAL HPI: Patient seen and examined at bedside.     VITAL SIGNS:  Vital Signs Last 24 Hrs  T(C): 36.4 (27 Dec 2021 05:10), Max: 36.8 (26 Dec 2021 20:52)  T(F): 97.6 (27 Dec 2021 05:10), Max: 98.3 (26 Dec 2021 20:52)  HR: 69 (27 Dec 2021 05:10) (69 - 80)  BP: 144/70 (27 Dec 2021 05:10) (144/70 - 162/84)  BP(mean): --  RR: 16 (27 Dec 2021 05:10) (16 - 18)  SpO2: 95% (27 Dec 2021 05:10) (95% - 99%)    PHYSICAL EXAM:    General: WDWN  HEENT: NC/AT; PERRL, anicteric sclera; MMM  Neck: supple  Cardiovascular: +S1/S2; RRR  Respiratory: CTA B/L; no W/R/R  Gastrointestinal: soft, NT/ND; +BSx4  Extremities: WWP; no edema, clubbing or cyanosis  Vascular: 2+ radial, DP/PT pulses B/L  Neurological: AAOx3; no focal deficits    MEDICATIONS:  MEDICATIONS  (STANDING):  artificial tears (preservative free) Ophthalmic Solution 1 Drop(s) Both EYES two times a day  buDESOnide    Inhalation Suspension 0.5 milliGRAM(s) Inhalation two times a day  dextrose 40% Gel 15 Gram(s) Oral once  dextrose 5%. 1000 milliLiter(s) (50 mL/Hr) IV Continuous <Continuous>  dextrose 5%. 1000 milliLiter(s) (100 mL/Hr) IV Continuous <Continuous>  dextrose 50% Injectable 25 Gram(s) IV Push once  dextrose 50% Injectable 12.5 Gram(s) IV Push once  dextrose 50% Injectable 25 Gram(s) IV Push once  enoxaparin Injectable 30 milliGRAM(s) SubCutaneous every 24 hours  glucagon  Injectable 1 milliGRAM(s) IntraMuscular once  insulin lispro (ADMELOG) corrective regimen sliding scale   SubCutaneous three times a day before meals  levothyroxine Injectable 66 MICROGram(s) IV Push at bedtime  lidocaine   4% Patch 1 Patch Transdermal every 24 hours  pantoprazole  Injectable 40 milliGRAM(s) IV Push daily    MEDICATIONS  (PRN):      ALLERGIES:  Allergies    Kiwi (Other)  No Known Drug Allergies  Nuts (Rash)  pitted fruits (Hives)  tree fruit (Unknown)  Tree Nuts (Unknown)    Intolerances        LABS:                        10.4   10.48 )-----------( 242      ( 26 Dec 2021 22:17 )             32.7     12-26    141  |  106  |  24<H>  ----------------------------<  92  3.7   |  23  |  0.48<L>    Ca    9.1      26 Dec 2021 22:17    TPro  6.7  /  Alb  3.2<L>  /  TBili  0.5  /  DBili  x   /  AST  51<H>  /  ALT  45  /  AlkPhos  323<H>  12-26        CAPILLARY BLOOD GLUCOSE          RADIOLOGY & ADDITIONAL TESTS: Reviewed.    ASSESSMENT:    PLAN:  OVERNIGHT EVENTS: MELONIE    SUBJECTIVE / INTERVAL HPI: Patient seen and examined at bedside.     VITAL SIGNS:  Vital Signs Last 24 Hrs  T(C): 36.4 (27 Dec 2021 05:10), Max: 36.8 (26 Dec 2021 20:52)  T(F): 97.6 (27 Dec 2021 05:10), Max: 98.3 (26 Dec 2021 20:52)  HR: 69 (27 Dec 2021 05:10) (69 - 80)  BP: 144/70 (27 Dec 2021 05:10) (144/70 - 162/84)  BP(mean): --  RR: 16 (27 Dec 2021 05:10) (16 - 18)  SpO2: 95% (27 Dec 2021 05:10) (95% - 99%)    PHYSICAL EXAM:    General: WDWN  HEENT: NC/AT; PERRL, anicteric sclera; MMM  Neck: supple  Cardiovascular: +S1/S2; RRR  Respiratory: CTA B/L; no W/R/R  Gastrointestinal: soft, NT/ND; +BSx4  Extremities: WWP; no edema, clubbing or cyanosis  Vascular: 2+ radial, DP/PT pulses B/L  Neurological: AAOx3; CN II-XII grossly intact, 5/5 muscle strength; unclear if sensation intact; patellar and     MEDICATIONS:  MEDICATIONS  (STANDING):  artificial tears (preservative free) Ophthalmic Solution 1 Drop(s) Both EYES two times a day  buDESOnide    Inhalation Suspension 0.5 milliGRAM(s) Inhalation two times a day  dextrose 40% Gel 15 Gram(s) Oral once  dextrose 5%. 1000 milliLiter(s) (50 mL/Hr) IV Continuous <Continuous>  dextrose 5%. 1000 milliLiter(s) (100 mL/Hr) IV Continuous <Continuous>  dextrose 50% Injectable 25 Gram(s) IV Push once  dextrose 50% Injectable 12.5 Gram(s) IV Push once  dextrose 50% Injectable 25 Gram(s) IV Push once  enoxaparin Injectable 30 milliGRAM(s) SubCutaneous every 24 hours  glucagon  Injectable 1 milliGRAM(s) IntraMuscular once  insulin lispro (ADMELOG) corrective regimen sliding scale   SubCutaneous three times a day before meals  levothyroxine Injectable 66 MICROGram(s) IV Push at bedtime  lidocaine   4% Patch 1 Patch Transdermal every 24 hours  pantoprazole  Injectable 40 milliGRAM(s) IV Push daily    MEDICATIONS  (PRN):      ALLERGIES:  Allergies    Kiwi (Other)  No Known Drug Allergies  Nuts (Rash)  pitted fruits (Hives)  tree fruit (Unknown)  Tree Nuts (Unknown)    Intolerances        LABS:                        10.4   10.48 )-----------( 242      ( 26 Dec 2021 22:17 )             32.7     12-26    141  |  106  |  24<H>  ----------------------------<  92  3.7   |  23  |  0.48<L>    Ca    9.1      26 Dec 2021 22:17    TPro  6.7  /  Alb  3.2<L>  /  TBili  0.5  /  DBili  x   /  AST  51<H>  /  ALT  45  /  AlkPhos  323<H>  12-26        CAPILLARY BLOOD GLUCOSE          RADIOLOGY & ADDITIONAL TESTS: Reviewed.    ASSESSMENT:    PLAN:  OVERNIGHT EVENTS: MELONIE    SUBJECTIVE / INTERVAL HPI: Patient seen and examined at bedside.     VITAL SIGNS:  Vital Signs Last 24 Hrs  T(C): 36.4 (27 Dec 2021 05:10), Max: 36.8 (26 Dec 2021 20:52)  T(F): 97.6 (27 Dec 2021 05:10), Max: 98.3 (26 Dec 2021 20:52)  HR: 69 (27 Dec 2021 05:10) (69 - 80)  BP: 144/70 (27 Dec 2021 05:10) (144/70 - 162/84)  BP(mean): --  RR: 16 (27 Dec 2021 05:10) (16 - 18)  SpO2: 95% (27 Dec 2021 05:10) (95% - 99%)    PHYSICAL EXAM:    General: WDWN  HEENT: NC/AT; PERRL, anicteric sclera; MMM  Neck: supple  Cardiovascular: +S1/S2; RRR  Respiratory: CTA B/L; no W/R/R  Gastrointestinal: soft, NT/ND; +BSx4  Extremities: WWP; no edema, clubbing or cyanosis  Vascular: 2+ radial, DP/PT pulses B/L  Neurological: AAOx3; CN II-XII grossly intact, 5/5 muscle strength; unclear if sensation intact; patellar and achilles reflexes +2  Derm: stage I sacral pressure ulcer    MEDICATIONS:  MEDICATIONS  (STANDING):  artificial tears (preservative free) Ophthalmic Solution 1 Drop(s) Both EYES two times a day  buDESOnide    Inhalation Suspension 0.5 milliGRAM(s) Inhalation two times a day  dextrose 40% Gel 15 Gram(s) Oral once  dextrose 5%. 1000 milliLiter(s) (50 mL/Hr) IV Continuous <Continuous>  dextrose 5%. 1000 milliLiter(s) (100 mL/Hr) IV Continuous <Continuous>  dextrose 50% Injectable 25 Gram(s) IV Push once  dextrose 50% Injectable 12.5 Gram(s) IV Push once  dextrose 50% Injectable 25 Gram(s) IV Push once  enoxaparin Injectable 30 milliGRAM(s) SubCutaneous every 24 hours  glucagon  Injectable 1 milliGRAM(s) IntraMuscular once  insulin lispro (ADMELOG) corrective regimen sliding scale   SubCutaneous three times a day before meals  levothyroxine Injectable 66 MICROGram(s) IV Push at bedtime  lidocaine   4% Patch 1 Patch Transdermal every 24 hours  pantoprazole  Injectable 40 milliGRAM(s) IV Push daily    MEDICATIONS  (PRN):      ALLERGIES:  Allergies    Kiwi (Other)  No Known Drug Allergies  Nuts (Rash)  pitted fruits (Hives)  tree fruit (Unknown)  Tree Nuts (Unknown)    Intolerances        LABS:                        10.4   10.48 )-----------( 242      ( 26 Dec 2021 22:17 )             32.7     12-26    141  |  106  |  24<H>  ----------------------------<  92  3.7   |  23  |  0.48<L>    Ca    9.1      26 Dec 2021 22:17    TPro  6.7  /  Alb  3.2<L>  /  TBili  0.5  /  DBili  x   /  AST  51<H>  /  ALT  45  /  AlkPhos  323<H>  12-26        CAPILLARY BLOOD GLUCOSE          RADIOLOGY & ADDITIONAL TESTS: Reviewed.    ASSESSMENT:    PLAN:  OVERNIGHT EVENTS: MELONIE    SUBJECTIVE / INTERVAL HPI: Patient seen and examined at bedside. Pt c/o sacral pain. Denies fevers, chills, abdominal pain.     VITAL SIGNS:  Vital Signs Last 24 Hrs  T(C): 36.4 (27 Dec 2021 05:10), Max: 36.8 (26 Dec 2021 20:52)  T(F): 97.6 (27 Dec 2021 05:10), Max: 98.3 (26 Dec 2021 20:52)  HR: 69 (27 Dec 2021 05:10) (69 - 80)  BP: 144/70 (27 Dec 2021 05:10) (144/70 - 162/84)  BP(mean): --  RR: 16 (27 Dec 2021 05:10) (16 - 18)  SpO2: 95% (27 Dec 2021 05:10) (95% - 99%)    PHYSICAL EXAM:    General: WDWN  HEENT: NC/AT; PERRL, anicteric sclera; MMM  Neck: supple  Cardiovascular: +S1/S2; RRR  Respiratory: CTA B/L; no W/R/R  Gastrointestinal: soft, NT/ND; +BSx4  Extremities: WWP; no edema, clubbing or cyanosis  Vascular: 2+ radial, DP/PT pulses B/L  Neurological: AAOx3; CN II-XII grossly intact, 5/5 muscle strength; unclear if sensation intact; patellar and achilles reflexes +2  Derm: stage I sacral pressure ulcer    MEDICATIONS:  MEDICATIONS  (STANDING):  artificial tears (preservative free) Ophthalmic Solution 1 Drop(s) Both EYES two times a day  buDESOnide    Inhalation Suspension 0.5 milliGRAM(s) Inhalation two times a day  dextrose 40% Gel 15 Gram(s) Oral once  dextrose 5%. 1000 milliLiter(s) (50 mL/Hr) IV Continuous <Continuous>  dextrose 5%. 1000 milliLiter(s) (100 mL/Hr) IV Continuous <Continuous>  dextrose 50% Injectable 25 Gram(s) IV Push once  dextrose 50% Injectable 12.5 Gram(s) IV Push once  dextrose 50% Injectable 25 Gram(s) IV Push once  enoxaparin Injectable 30 milliGRAM(s) SubCutaneous every 24 hours  glucagon  Injectable 1 milliGRAM(s) IntraMuscular once  insulin lispro (ADMELOG) corrective regimen sliding scale   SubCutaneous three times a day before meals  levothyroxine Injectable 66 MICROGram(s) IV Push at bedtime  lidocaine   4% Patch 1 Patch Transdermal every 24 hours  pantoprazole  Injectable 40 milliGRAM(s) IV Push daily    MEDICATIONS  (PRN):      ALLERGIES:  Allergies    Kiwi (Other)  No Known Drug Allergies  Nuts (Rash)  pitted fruits (Hives)  tree fruit (Unknown)  Tree Nuts (Unknown)    Intolerances        LABS:                        10.4   10.48 )-----------( 242      ( 26 Dec 2021 22:17 )             32.7     12-26    141  |  106  |  24<H>  ----------------------------<  92  3.7   |  23  |  0.48<L>    Ca    9.1      26 Dec 2021 22:17    TPro  6.7  /  Alb  3.2<L>  /  TBili  0.5  /  DBili  x   /  AST  51<H>  /  ALT  45  /  AlkPhos  323<H>  12-26        CAPILLARY BLOOD GLUCOSE          RADIOLOGY & ADDITIONAL TESTS: Reviewed.    ASSESSMENT:    PLAN:

## 2021-12-27 NOTE — CONSULT NOTE ADULT - ASSESSMENT
per Internal Medicine     82 yo F with PMH CVA x3, dementia (AOx3), HTN, HLD, COPD, asthma, hypothyroidism, and recurrent UTIs who p/w frequent falls and sacral/back pain x2d. Admitted to [facility] for further observation and management.    Problem/Plan - 1:  ·  Problem: Falls.   ·  Plan: - pt p/w 2d multiple mechanical falls and back/sacral pain; no history of similar issue in the past; baseline ambulates with walker but intermittently non-compliant; lives with caretaker/eliu Gould but has had private aides at home for the past week; caregiver concerned that he can't provide adequate care and pt may need CHIDI  - falls have been witnessed, most likely mechanical in nature; intermittently compliant with walker  - pt with full recollection of falls, states most recent fall related to walker use and grabbing it incorrectly and losing her balance; no urinary or bowel incontinence or LOC c/f seizure activity  - f/u EKG  - CTH neg  - CT L-spine with degenerative changes, severe scoliosis, neg fx, massive dilation of distal esophagus with food consistency materials vs. distended hiatal hernia  - XR hip/pelvis/sacrum pending  - neuro exam benign  - PT eval  - fall precautions.    Problem/Plan - 2:  ·  Problem: FTT (failure to thrive) in adult.   ·  Plan: #Protein calorie malnutrition  BMI 19. albumin 3.2  - consult nutrition. rest of plan as above  - nutrition consult  - when tolerating PO, would start ensure supplement BID and MV.    Problem/Plan - 3:  ·  Problem: Severe scoliosis.   ·  Plan: seen on CT LS,   - PT evaluation.    Problem/Plan - 4:  ·  Problem: Esophageal dilatation.   ·  Plan: - CT L-spine as above  - pt without c/o choking sensation, reflux, or vomiting  - NPO for now  - S+S eval  - consider GI consult.    Problem/Plan - 5:  ·  Problem: Constipation.   ·  Plan: - CT L-spine with some stool burden  - start bowel regimen  - continue to monitor  - may be contributing to recurrent UTIs, as below.    Problem/Plan - 6:  ·  Problem: Lung nodules.   ·  Plan: 1.3 cm nodular parenchymal opacity in the posterior right lower lung seen on CT Lumbar spine, will need further workup as outpatient.    Problem/Plan - 7:  ·  Problem: Recurrent UTI.   ·  Plan: - per caregiver, pt with hx recurrent UTIs, uses adult diapers and does not notify caregiver need for changing   - chart review with 10/2020 and 9/2021 UCx +klebsiella s/t CTX  - check UA and UCx.    Problem/Plan - 8:  ·  Problem: Dementia.   ·  Plan: - pt with hx dementia, baseline AOx2-3  - exam with AOx4 (self, place, time, president) however answers questions inappropriately at times and poor historian    #hypothyroidism  - pt with hx hypothyroidism, on synthroid 88mcg outpt  - c/w home med  - check TSH.    Problem/Plan - 9:  ·  Problem: HTN (hypertension).   ·  Plan: - pt with hx HTN, diet controlled  - /84 on arrival likely in setting of sacral pain  - continue to monitor    #HLD  pt with hx HLD, on atorvastatin 20mg outpt  - c/w home med.    Problem/Plan - 10:  ·  Problem: COPD with asthma.   ·  Plan; - pt with hx COPD and asthma, on ventolin and pulmicort outpt  - c/w home meds.    Problem/Plan - 11:  ·  Problem: Normocytic anemia.   ·  Plan: Hgb 10.4.    Problem/Plan - 12:  ·  Problem: Nutrition, metabolism, and development symptoms.   ·  Plan: F: PO  E: replete PRN  N: NPO for now  DVT ppx: lovenox  GI PPx: None    FULL CODE    Dispo: [facility].

## 2021-12-27 NOTE — H&P ADULT - HISTORY OF PRESENT ILLNESS
Pt is an 80 yo F with PMH CVA x3, dementia (AOx3), HTN, HLD, COPD, asthma, hypothyroidism, and recurrent UTIs who p/w frequent falls and sacral/back pain x2d. Lives with her godson/caregiver who has been taking care of her for over a decade. Majority of history obtained from caretaker Channinghany Gould, as pt is a poor historian. Per Channing, pt has had 3 falls in the last 24h, all witnessed and likely in setting of gait instability. Intermittently walks with a walker, but had a third fall 12/26 and since had been complaining of sacral/tailbone pain. He had her sit down but she repeatedly stood up d/t pain. Because the pain would not go away despite tylenol and rest, Channing sent her to the hospital. He states that she does not have a history of similar falls and is at baseline functional and mostly independent. For the last week he has hired other caregivers to watch after her. Has a history of recurrent UTIs as she wears adult diapers and does not notify anyone of when she has urinated. No recent changes to medication. At baseline has poor appetite and eats little. No documentation of COVID vaccination and pt does not recall. ROS otherwise negative. No other concerns or complaints.    On arrival, T 98.3, HR 80, /84, RR 18 O2sat 99% RA. EKG pending. Labs with WBC 10.48, 86% neutrophils, Hb 10.4, MCV 89.8, alb 3.2, , AST 51, CK neg. COVID neg. CXR without any acute infiltrate. CTH neg. CT L-spine with degenerative changes, severe scoliosis, neg fx, massive dilation of distal esophagus with food consistency materials vs. distended hiatal hernia. Pt given tylenol. Admitted to [facility] for further observation and management.

## 2021-12-27 NOTE — H&P ADULT - PROBLEM SELECTOR PLAN 1
- pt p/w 2d multiple mechanical falls and back/sacral pain; no history of similar issue in the past; baseline ambulates with walker but intermittently non-compliant; lives with caretaker/eliu Gould but has had private aides at home for the past week  - falls have been witnessed, most likely mechanical in nature  - f/u - pt p/w 2d multiple mechanical falls and back/sacral pain; no history of similar issue in the past; baseline ambulates with walker but intermittently non-compliant; lives with caretaker/eliu Gould but has had private aides at home for the past week; caregiver concerned that he can't provide adequate care and pt may need CHIDI  - falls have been witnessed, most likely mechanical in nature; intermittently compliant with walker  - pt with full recollection of falls, states most recent fall related to walker use and grabbing it incorrectly and losing her balance; no urinary or bowel incontinence or LOC c/f seizure activity  - f/u EKG  - CTH neg  - CT L-spine with degenerative changes, severe scoliosis, neg fx, massive dilation of distal esophagus with food consistency materials vs. distended hiatal hernia  - XR hip/pelvis/sacrum pending  - neuro exam benign  - PT eval  - fall precautions

## 2021-12-27 NOTE — DIETITIAN INITIAL EVALUATION ADULT. - PROBLEM SELECTOR PLAN 5
- CT L-spine with some stool burden  - pt does not recall last BM but admits to passing gas  - would start bowel regimen once able to tolerate PO  - continue to monitor  - may be contributing to recurrent UTIs, as below

## 2021-12-27 NOTE — CONSULT NOTE ADULT - SUBJECTIVE AND OBJECTIVE BOX
HPI:  Pt is an 82 yo F with PMH CVA x3, dementia (AOx3), HTN, HLD, COPD, asthma, hypothyroidism, and recurrent UTIs who p/w frequent falls and sacral/back pain x2d. Lives with her godson/caregiver who has been taking care of her for over a decade. Majority of history obtained from caretaker Channinghany Gould, as pt is a poor historian. Per Channing, pt has had 3 falls in the last 24h, all witnessed and likely in setting of gait instability. Intermittently walks with a walker, but had a third fall 12/26 and since had been complaining of sacral/tailbone pain. He had her sit down but she repeatedly stood up d/t pain. Because the pain would not go away despite tylenol and rest, Channing sent her to the hospital. He states that she does not have a history of similar falls and is at baseline functional and mostly independent. For the last week he has hired other caregivers to watch after her. Has a history of recurrent UTIs as she wears adult diapers and does not notify anyone of when she has urinated. No recent changes to medication. At baseline has poor appetite and eats little. No documentation of COVID vaccination and pt does not recall. ROS otherwise negative. No other concerns or complaints.    On arrival, T 98.3, HR 80, /84, RR 18 O2sat 99% RA. EKG pending. Labs with WBC 10.48, 86% neutrophils, Hb 10.4, MCV 89.8, alb 3.2, , AST 51, CK neg. COVID neg. CXR without any acute infiltrate. CTH neg. CT L-spine with degenerative changes, severe scoliosis, neg fx, massive dilation of distal esophagus with food consistency materials vs. distended hiatal hernia. Pt given tylenol. Admitted to [facility] [place] for further observation and management.  (27 Dec 2021 01:02)    GI consulted for dilated esophagus noted on CT scan.    Allergies    Kiwi (Other)  No Known Drug Allergies  Nuts (Rash)  pitted fruits (Hives)  tree fruit (Unknown)  Tree Nuts (Unknown)    Intolerances      MEDICATIONS:  MEDICATIONS  (STANDING):  acetaminophen     Tablet .. 650 milliGRAM(s) Oral every 6 hours  artificial tears (preservative free) Ophthalmic Solution 1 Drop(s) Both EYES two times a day  bisacodyl Suppository 10 milliGRAM(s) Rectal once  buDESOnide    Inhalation Suspension 0.5 milliGRAM(s) Inhalation two times a day  dextrose 40% Gel 15 Gram(s) Oral once  dextrose 5%. 1000 milliLiter(s) (50 mL/Hr) IV Continuous <Continuous>  dextrose 5%. 1000 milliLiter(s) (100 mL/Hr) IV Continuous <Continuous>  dextrose 50% Injectable 25 Gram(s) IV Push once  dextrose 50% Injectable 12.5 Gram(s) IV Push once  dextrose 50% Injectable 25 Gram(s) IV Push once  enoxaparin Injectable 30 milliGRAM(s) SubCutaneous every 24 hours  glucagon  Injectable 1 milliGRAM(s) IntraMuscular once  insulin lispro (ADMELOG) corrective regimen sliding scale   SubCutaneous three times a day before meals  levothyroxine Injectable 66 MICROGram(s) IV Push at bedtime  lidocaine   4% Patch 1 Patch Transdermal every 24 hours  pantoprazole  Injectable 40 milliGRAM(s) IV Push daily    MEDICATIONS  (PRN):    PAST MEDICAL & SURGICAL HISTORY:  Asthma    HTN (hypertension)    High cholesterol    GERD (gastroesophageal reflux disease)    Hypothyroid    Depression    Primary biliary cirrhosis    Dementia    History of Nissen fundoplication    History of cholecystectomy    H/O knee surgery    S/P cataract surgery      FAMILY HISTORY:  Family history of CVA  mother    FH: myocardial infarction  father      SOCIAL HISTORY:  [place]: [ ] Current, [ ] Former, [ ] Never; Pack Years:  Alcohol:  Illicit Drugs:    REVIEW OF SYSTEMS:  Constitutional: No fever, chills, weight loss, or fatigue  ENMT:  No visual changes; No difficulty hearing, tinnitus, vertigo; No sinus or throat pain  Neck: No pain or stiffness  Respiratory: No cough, wheezing, or hemoptysis; No shortness of breath  Cardiovascular: No chest pain, palpitations, dizziness, orthopnea, PND, or leg swelling  Gastrointestinal: No abdominal or epigastric pain. No  nausea, vomiting, or hematemesis. No diarrhea, constipation, or steatorrhea. No melena or hematochezia  Genitourinary: No dysuria, urinary frequency/hesitancy, or hematuria  Skin: No itching, burning, rashes or lesions   Musculoskeletal: No joint pain or swelling; No muscle, back or extremity pain    Vital Signs Last 24 Hrs  T(C): 36.4 (27 Dec 2021 05:10), Max: 36.8 (26 Dec 2021 20:52)  T(F): 97.6 (27 Dec 2021 05:10), Max: 98.3 (26 Dec 2021 20:52)  HR: 60 (27 Dec 2021 09:03) (60 - 80)  BP: 166/92 (27 Dec 2021 09:03) (144/70 - 166/92)  BP(mean): --  RR: 18 (27 Dec 2021 09:03) (16 - 18)  SpO2: 95% (27 Dec 2021 09:03) (95% - 99%)      PHYSICAL EXAM:    General: thin, frail appearing woman; lying comfortably in bed; in no acute distress  HEENT: MMM, conjunctiva and sclera clear  Gastrointestinal: Soft, non-tender non-distended; Normal bowel sounds; No rebound or guarding  : Primafit in place  Extremities: Normal range of motion, No clubbing, cyanosis or edema  Neurological: Alert and oriented x3  Skin: Warm and dry. No obvious rash    LABS:                        10.5   6.33  )-----------( 241      ( 27 Dec 2021 08:16 )             32.9     12-27    141  |  107  |  21  ----------------------------<  84  3.5   |  24  |  0.50    Ca    9.0      27 Dec 2021 08:03  Phos  2.8     12-27  Mg     2.0     12-27    TPro  6.4  /  Alb  3.0<L>  /  TBili  0.6  /  DBili  x   /  AST  41<H>  /  ALT  38  /  AlkPhos  285<H>  12-27        RADIOLOGY & ADDITIONAL STUDIES:

## 2021-12-27 NOTE — DIETITIAN INITIAL EVALUATION ADULT. - PROBLEM SELECTOR PLAN 10
- pt with hx HTN, diet controlled  - /84 on arrival likely in setting of sacral pain  - continue to monitor

## 2021-12-27 NOTE — PATIENT PROFILE ADULT - NSPROPTRIGHTNOTIFY_GEN_A_NUR
Patient needs to schedule his home sleep study    Reprinted information so he could call to get coordinated declines

## 2021-12-27 NOTE — DIETITIAN INITIAL EVALUATION ADULT. - MALNUTRITION
severe protein calorie malnutrition severe protein calorie malnutrition as evidenced by severe fat and muscle wasting, 36% unintentional wt loss >1 year

## 2021-12-27 NOTE — PHYSICAL THERAPY INITIAL EVALUATION ADULT - DISCHARGE DISPOSITION, PT EVAL
Subacute Rehab vs Home with \[facility] [place] [facility]\"" and 24/7 aid/rehabilitation facility

## 2021-12-27 NOTE — CONSULT NOTE ADULT - ASSESSMENT
82 yo F with PMH CVA x3, moderate dementia, HTN, HLD, COPD, asthma, hypothyroidism, and recurrent UTIs who presented 12/27 with frequent falls and sacral pain, found to have sacral fx and esophageal dilation. Palliative care consulted for GOC.

## 2021-12-27 NOTE — H&P ADULT - PROBLEM SELECTOR PLAN 10
- pt with hx hypothyroidism, on synthroid 88mcg outpt  - c/w home med  - check TSH - pt with hx HTN, diet controlled  - /84 on arrival likely in setting of sacral pain  - continue to monitor

## 2021-12-27 NOTE — H&P ADULT - PROBLEM SELECTOR PLAN 8
- pt with hx HLD, on atorvastatin 20mg outpt  - c/w home med - BMI 19.1  - albumin 3.2  - nutrition consult  - when tolerating PO, would start ensure supplement BID and MV

## 2021-12-27 NOTE — H&P ADULT - PROBLEM SELECTOR PLAN 12
- F: none  - E: replete K<4, Mg<2  - N: NPO pending S+S  - D: lovenox 40mg q24h  - G: protonix 40mg daily    code: full  dispo: [facility] - pt with hx COPD and asthma, on ventolin and pulmicort outpt  - c/w home meds

## 2021-12-27 NOTE — SWALLOW BEDSIDE ASSESSMENT ADULT - SLP PERTINENT HISTORY OF CURRENT PROBLEM
PMH CVA x3, dementia (AOx3), HTN, HLD, COPD, asthma, hypothyroidism, and recurrent UTIs who p/w frequent falls and sacral/back pain x2d. Admitted to [email or web address] for further observation and management.

## 2021-12-27 NOTE — SWALLOW BEDSIDE ASSESSMENT ADULT - SWALLOW EVAL: DIAGNOSIS
Clinical signs of pharyngeal phase deficits in pt with multiple dysphagia risk factors including multiple prior CVAs, dementia, and COPD. Questionable concomitant esophageal phase deficits given CT findings of, "[...] massive dilation of distal esophagus with food consistency materials vs. distended hiatal hernia." Pt would benefit from further instrumental testing to assess swallow anatomy and physiology. VFSS preferred given its ability for simultaneous screening of the esophagus.

## 2021-12-27 NOTE — DIETITIAN INITIAL EVALUATION ADULT. - PROBLEM SELECTOR PLAN 9
- pt with hx dementia, baseline AOx2-3  - exam with AOx4 (self, place, time, president) however answers questions inappropriately at times and poor historian

## 2021-12-27 NOTE — PHYSICAL THERAPY INITIAL EVALUATION ADULT - GENERAL OBSERVATIONS, REHAB EVAL
Patient received semi-mclean in bed  in NAD on RA, +Primafit, +Heplock. Cleared by BRENDA Silva. Agreeable to PT.

## 2021-12-27 NOTE — ED ADULT NURSE REASSESSMENT NOTE - NS ED NURSE REASSESS COMMENT FT1
PT refused pain medication, upset she cant eat. denies chest pain, sob, nausea, vomiting, fever, sent to x ray and then to the floor. Pt straight catherized and urine sent

## 2021-12-27 NOTE — SWALLOW BEDSIDE ASSESSMENT ADULT - NS SPL SWALLOW CLINIC TRIAL FT
Poor self-monitoring of drinking rate. Hand-over-hand assistance provided to encourage small single sips. Limited assessment of oral phase as solids were deferred. However, adequate bolus containment and oral processing. Swallow trigger palpated. Both immediate and delayed weak and non-productive coughing with cup sips of thin liquid and puree trials suggestive of aspiration.

## 2021-12-27 NOTE — PROGRESS NOTE ADULT - PROBLEM SELECTOR PLAN 2
#Protein calorie malnutrition  BMI 19. albumin 3.2  - consult nutrition. rest of plan as above  - nutrition consult  - when tolerating PO, would start ensure supplement BID and MV CT L-spine with severe scoliosis, subtle and non-displaced S4-sacrum fx  - ortho consulted, appreciate recs  - pain mgmt with standing tylenol, lidocaine patch    #Scoliosis  seen on CT LS,   - PT evaluation

## 2021-12-27 NOTE — SWALLOW VFSS/MBS ASSESSMENT ADULT - PHARYNGEAL PHASE COMMENTS
Initiation of pharyngeal swallow response was generally timely.  During swallows, hyolaryngeal excursion, base of tongue to posterior pharyngeal wall contraction, and epiglottic inversion were within functional limits.  No laryngeal penetration/aspiration or post-deglutititve pharyngeal residue noted across trials.

## 2021-12-27 NOTE — DIETITIAN INITIAL EVALUATION ADULT. - PROBLEM SELECTOR PLAN 8
- BMI 19.1  - albumin 3.2  - nutrition consult  - when tolerating PO, would start ensure supplement BID and MV

## 2021-12-27 NOTE — CONSULT NOTE ADULT - PROBLEM SELECTOR RECOMMENDATION 7
.  -Channing Gould #[phone]  - Per Channing, he is HCP, has paperwork at home   - Recommended DNR DNI iso Channing zhu to deliberate with other caregiver prior to completing MOLST  - continue Full code and other inventions .  -Channing Gould #[phone]  - Per Channing, he is HCP, has paperwork at home   - Recommended DNR DNI iso frailtyChanning to deliberate with other caregiver prior to completing MOLST  - continue Full code and other interventions  - all questions answered, emotional support provided   - will continue to follow with you

## 2021-12-27 NOTE — SWALLOW VFSS/MBS ASSESSMENT ADULT - DIAGNOSTIC IMPRESSIONS
Pt presents with intact oral and pharyngeal stages of swallowing with no post-deglutitive pharyngeal residue and no airway protection deficits across consistencies tested.  Patient deemed safe to resume a po diet with general aspiration precautions barring findings from esophogram being conducted later today that precludes pt from getting po.

## 2021-12-27 NOTE — PROGRESS NOTE ADULT - PROBLEM SELECTOR PLAN 10
- pt with hx COPD and asthma, on ventolin and pulmicort outpt  - c/w home meds - pt with hx COPD and asthma, on ventolin and pulmicort outpt  - c/w home meds    #Lung nodule  1.3 cm nodular parenchymal opacity in the posterior right lower lung seen on CT Lumbar spine, will need further workup as outpatient

## 2021-12-27 NOTE — PATIENT PROFILE ADULT - NSPRESCRALCFREQ_GEN_A_NUR
Body Location Override (Optional - Billing Will Still Be Based On Selected Body Map Location If Applicable): chest Never

## 2021-12-27 NOTE — PROGRESS NOTE ADULT - PROBLEM SELECTOR PLAN 4
- CT L-spine as above  - pt without c/o choking sensation, reflux, or vomiting  - NPO for now  - S+S eval  - consider GI consult - CT L-spine massive dilation of distal esophagus with food consistency materials vs. distended hiatal hernia  - pt currently without c/o choking sensation, reflux, or vomiting  - NPO for now  - S+S eval  - GI consulted, appreciate recs

## 2021-12-27 NOTE — SWALLOW BEDSIDE ASSESSMENT ADULT - COMMENTS
CT L-spine with degenerative changes, severe scoliosis, neg fx, massive dilation of distal esophagus with food consistency materials vs. distended hiatal hernia

## 2021-12-27 NOTE — CONSULT NOTE ADULT - PROBLEM SELECTOR RECOMMENDATION 4
.  alb 3.0 (12/27), BMI 14. poor healing sacral wound. generalized loss of adipose tissue with protruding clavicles on exam.   - nutrition consult. appreciate recs  - assist with meal set up

## 2021-12-27 NOTE — H&P ADULT - NSHPLABSRESULTS_GEN_ALL_CORE
LABS:                        10.4   10.48 )-----------( 242      ( 26 Dec 2021 22:17 )             32.7     12-26    141  |  106  |  24<H>  ----------------------------<  92  3.7   |  23  |  0.48<L>    Ca    9.1      26 Dec 2021 22:17    TPro  6.7  /  Alb  3.2<L>  /  TBili  0.5  /  DBili  x   /  AST  51<H>  /  ALT  45  /  AlkPhos  323<H>  12-26        CAPILLARY BLOOD GLUCOSE          RADIOLOGY & ADDITIONAL TESTS: Reviewed.

## 2021-12-27 NOTE — H&P ADULT - PROBLEM SELECTOR PLAN 3
- CT L-spine with some stool burden  - pt does not recall last BM but admits to passing gas  - would start bowel regimen once able to tolerate PO  - continue to monitor  - may be contributing to recurrent UTIs, as below ADD: seen on CT LS, PT evaluation, plan per #1

## 2021-12-28 LAB
ANION GAP SERPL CALC-SCNC: 8 MMOL/L — SIGNIFICANT CHANGE UP (ref 5–17)
APPEARANCE UR: ABNORMAL
APTT BLD: 35.9 SEC — HIGH (ref 27.5–35.5)
BACTERIA # UR AUTO: ABNORMAL /HPF
BASOPHILS # BLD AUTO: 0.03 K/UL — SIGNIFICANT CHANGE UP (ref 0–0.2)
BASOPHILS NFR BLD AUTO: 0.5 % — SIGNIFICANT CHANGE UP (ref 0–2)
BILIRUB UR-MCNC: NEGATIVE — SIGNIFICANT CHANGE UP
BLD GP AB SCN SERPL QL: NEGATIVE — SIGNIFICANT CHANGE UP
BUN SERPL-MCNC: 19 MG/DL — SIGNIFICANT CHANGE UP (ref 7–23)
CALCIUM SERPL-MCNC: 8.9 MG/DL — SIGNIFICANT CHANGE UP (ref 8.4–10.5)
CHLORIDE SERPL-SCNC: 105 MMOL/L — SIGNIFICANT CHANGE UP (ref 96–108)
CO2 SERPL-SCNC: 26 MMOL/L — SIGNIFICANT CHANGE UP (ref 22–31)
COD CRY URNS QL: ABNORMAL /HPF
COLOR SPEC: YELLOW — SIGNIFICANT CHANGE UP
COMMENT - URINE: SIGNIFICANT CHANGE UP
CREAT SERPL-MCNC: 0.58 MG/DL — SIGNIFICANT CHANGE UP (ref 0.5–1.3)
CULTURE RESULTS: SIGNIFICANT CHANGE UP
DIFF PNL FLD: NEGATIVE — SIGNIFICANT CHANGE UP
EOSINOPHIL # BLD AUTO: 0.28 K/UL — SIGNIFICANT CHANGE UP (ref 0–0.5)
EOSINOPHIL NFR BLD AUTO: 4.9 % — SIGNIFICANT CHANGE UP (ref 0–6)
EPI CELLS # UR: SIGNIFICANT CHANGE UP /HPF (ref 0–5)
GLUCOSE BLDC GLUCOMTR-MCNC: 105 MG/DL — HIGH (ref 70–99)
GLUCOSE BLDC GLUCOMTR-MCNC: 82 MG/DL — SIGNIFICANT CHANGE UP (ref 70–99)
GLUCOSE BLDC GLUCOMTR-MCNC: 85 MG/DL — SIGNIFICANT CHANGE UP (ref 70–99)
GLUCOSE SERPL-MCNC: 87 MG/DL — SIGNIFICANT CHANGE UP (ref 70–99)
GLUCOSE UR QL: NEGATIVE — SIGNIFICANT CHANGE UP
HCT VFR BLD CALC: 32.3 % — LOW (ref 34.5–45)
HGB BLD-MCNC: 10.3 G/DL — LOW (ref 11.5–15.5)
IMM GRANULOCYTES NFR BLD AUTO: 0.5 % — SIGNIFICANT CHANGE UP (ref 0–1.5)
INR BLD: 0.97 — SIGNIFICANT CHANGE UP (ref 0.88–1.16)
KETONES UR-MCNC: 40 MG/DL
LEUKOCYTE ESTERASE UR-ACNC: NEGATIVE — SIGNIFICANT CHANGE UP
LYMPHOCYTES # BLD AUTO: 0.4 K/UL — LOW (ref 1–3.3)
LYMPHOCYTES # BLD AUTO: 7.1 % — LOW (ref 13–44)
MAGNESIUM SERPL-MCNC: 1.9 MG/DL — SIGNIFICANT CHANGE UP (ref 1.6–2.6)
MCHC RBC-ENTMCNC: 28.9 PG — SIGNIFICANT CHANGE UP (ref 27–34)
MCHC RBC-ENTMCNC: 31.9 GM/DL — LOW (ref 32–36)
MCV RBC AUTO: 90.5 FL — SIGNIFICANT CHANGE UP (ref 80–100)
MONOCYTES # BLD AUTO: 0.46 K/UL — SIGNIFICANT CHANGE UP (ref 0–0.9)
MONOCYTES NFR BLD AUTO: 8.1 % — SIGNIFICANT CHANGE UP (ref 2–14)
NEUTROPHILS # BLD AUTO: 4.47 K/UL — SIGNIFICANT CHANGE UP (ref 1.8–7.4)
NEUTROPHILS NFR BLD AUTO: 78.9 % — HIGH (ref 43–77)
NITRITE UR-MCNC: POSITIVE
NRBC # BLD: 0 /100 WBCS — SIGNIFICANT CHANGE UP (ref 0–0)
PH UR: 5.5 — SIGNIFICANT CHANGE UP (ref 5–8)
PHOSPHATE SERPL-MCNC: 3.1 MG/DL — SIGNIFICANT CHANGE UP (ref 2.5–4.5)
PLATELET # BLD AUTO: 243 K/UL — SIGNIFICANT CHANGE UP (ref 150–400)
POTASSIUM SERPL-MCNC: 3.5 MMOL/L — SIGNIFICANT CHANGE UP (ref 3.5–5.3)
POTASSIUM SERPL-SCNC: 3.5 MMOL/L — SIGNIFICANT CHANGE UP (ref 3.5–5.3)
PROT UR-MCNC: ABNORMAL MG/DL
PROTHROM AB SERPL-ACNC: 11.7 SEC — SIGNIFICANT CHANGE UP (ref 10.6–13.6)
RBC # BLD: 3.57 M/UL — LOW (ref 3.8–5.2)
RBC # FLD: 15.2 % — HIGH (ref 10.3–14.5)
RBC CASTS # UR COMP ASSIST: < 5 /HPF — SIGNIFICANT CHANGE UP
RH IG SCN BLD-IMP: POSITIVE — SIGNIFICANT CHANGE UP
SODIUM SERPL-SCNC: 139 MMOL/L — SIGNIFICANT CHANGE UP (ref 135–145)
SP GR SPEC: >=1.03 — SIGNIFICANT CHANGE UP (ref 1–1.03)
SPECIMEN SOURCE: SIGNIFICANT CHANGE UP
T4 AB SER-ACNC: 6.06 UG/DL — SIGNIFICANT CHANGE UP (ref 4.5–11.7)
UROBILINOGEN FLD QL: 0.2 E.U./DL — SIGNIFICANT CHANGE UP
WBC # BLD: 5.67 K/UL — SIGNIFICANT CHANGE UP (ref 3.8–10.5)
WBC # FLD AUTO: 5.67 K/UL — SIGNIFICANT CHANGE UP (ref 3.8–10.5)
WBC UR QL: > 10 /HPF

## 2021-12-28 PROCEDURE — 43235 EGD DIAGNOSTIC BRUSH WASH: CPT | Mod: GC

## 2021-12-28 PROCEDURE — 99232 SBSQ HOSP IP/OBS MODERATE 35: CPT | Mod: GC

## 2021-12-28 RX ORDER — HYDRALAZINE HCL 50 MG
25 TABLET ORAL ONCE
Refills: 0 | Status: DISCONTINUED | OUTPATIENT
Start: 2021-12-28 | End: 2021-12-28

## 2021-12-28 RX ORDER — HYDRALAZINE HCL 50 MG
5 TABLET ORAL ONCE
Refills: 0 | Status: COMPLETED | OUTPATIENT
Start: 2021-12-28 | End: 2021-12-28

## 2021-12-28 RX ORDER — ATORVASTATIN CALCIUM 80 MG/1
20 TABLET, FILM COATED ORAL AT BEDTIME
Refills: 0 | Status: DISCONTINUED | OUTPATIENT
Start: 2021-12-29 | End: 2021-12-29

## 2021-12-28 RX ORDER — ACETAMINOPHEN 500 MG
2 TABLET ORAL
Qty: 0 | Refills: 0 | DISCHARGE
Start: 2021-12-28

## 2021-12-28 RX ORDER — LEVOTHYROXINE SODIUM 125 MCG
88 TABLET ORAL EVERY 24 HOURS
Refills: 0 | Status: DISCONTINUED | OUTPATIENT
Start: 2021-12-29 | End: 2021-12-29

## 2021-12-28 RX ORDER — PANTOPRAZOLE SODIUM 20 MG/1
40 TABLET, DELAYED RELEASE ORAL EVERY 12 HOURS
Refills: 0 | Status: DISCONTINUED | OUTPATIENT
Start: 2021-12-28 | End: 2021-12-29

## 2021-12-28 RX ORDER — AMLODIPINE BESYLATE 2.5 MG/1
5 TABLET ORAL EVERY 24 HOURS
Refills: 0 | Status: DISCONTINUED | OUTPATIENT
Start: 2021-12-28 | End: 2021-12-29

## 2021-12-28 RX ADMIN — Medication 650 MILLIGRAM(S): at 21:45

## 2021-12-28 RX ADMIN — Medication 0.5 MILLIGRAM(S): at 09:41

## 2021-12-28 RX ADMIN — Medication 650 MILLIGRAM(S): at 06:10

## 2021-12-28 RX ADMIN — Medication 1 DROP(S): at 06:10

## 2021-12-28 RX ADMIN — PANTOPRAZOLE SODIUM 40 MILLIGRAM(S): 20 TABLET, DELAYED RELEASE ORAL at 21:45

## 2021-12-28 RX ADMIN — ESCITALOPRAM OXALATE 5 MILLIGRAM(S): 10 TABLET, FILM COATED ORAL at 21:45

## 2021-12-28 RX ADMIN — POLYETHYLENE GLYCOL 3350 17 GRAM(S): 17 POWDER, FOR SOLUTION ORAL at 13:43

## 2021-12-28 RX ADMIN — Medication 650 MILLIGRAM(S): at 01:09

## 2021-12-28 RX ADMIN — Medication 66 MICROGRAM(S): at 21:46

## 2021-12-28 RX ADMIN — PANTOPRAZOLE SODIUM 40 MILLIGRAM(S): 20 TABLET, DELAYED RELEASE ORAL at 13:42

## 2021-12-28 RX ADMIN — Medication 650 MILLIGRAM(S): at 22:43

## 2021-12-28 RX ADMIN — Medication 0.5 MILLIGRAM(S): at 21:45

## 2021-12-28 RX ADMIN — Medication 650 MILLIGRAM(S): at 07:03

## 2021-12-28 RX ADMIN — ENOXAPARIN SODIUM 30 MILLIGRAM(S): 100 INJECTION SUBCUTANEOUS at 06:11

## 2021-12-28 RX ADMIN — Medication 5 MILLIGRAM(S): at 19:30

## 2021-12-28 RX ADMIN — AMLODIPINE BESYLATE 5 MILLIGRAM(S): 2.5 TABLET ORAL at 18:31

## 2021-12-28 RX ADMIN — Medication 650 MILLIGRAM(S): at 15:28

## 2021-12-28 RX ADMIN — Medication 650 MILLIGRAM(S): at 00:20

## 2021-12-28 NOTE — DISCHARGE NOTE PROVIDER - CARE PROVIDERS DIRECT ADDRESSES
[email or web address] ,DirectAddress_Unknown

## 2021-12-28 NOTE — PROGRESS NOTE ADULT - PROBLEM SELECTOR PLAN 1
Pt p/w 2d multiple mechanical falls and back/sacral pain; no history of similar issue in the past; baseline ambulates with walker but intermittently non-compliant; lives with caretaker/eliu Gould but has had private aides at home for the past week; caregiver concerned that he can't provide adequate care and pt may need CHIDI. Falls have been witnessed, most likely mechanical in nature; intermittently compliant with walker. Pt states most recent fall related to walker use and grabbing it incorrectly and losing her balance; no urinary or bowel incontinence or LOC c/f seizure activity. CTH neg. neuro exam benign. CT L-spine with severe scoliosis, subtle and non-displaced S4-sacrum fx  - PT eval  - fall precautions  - f/u B12, folate, TSH

## 2021-12-28 NOTE — PROGRESS NOTE ADULT - SUBJECTIVE AND OBJECTIVE BOX
**Incomplete Note**   OVERNIGHT EVENTS: MELONIE    SUBJECTIVE / INTERVAL HPI: Patient seen and examined at bedside.     VITAL SIGNS:  Vital Signs Last 24 Hrs  T(C): 37.1 (28 Dec 2021 05:36), Max: 37.1 (28 Dec 2021 05:36)  T(F): 98.7 (28 Dec 2021 05:36), Max: 98.7 (28 Dec 2021 05:36)  HR: 65 (28 Dec 2021 05:36) (60 - 85)  BP: 165/86 (28 Dec 2021 05:36) (148/92 - 181/94)  BP(mean): --  RR: 16 (28 Dec 2021 05:36) (16 - 18)  SpO2: 94% (28 Dec 2021 05:36) (93% - 96%)    PHYSICAL EXAM:    General: WDWN  HEENT: NC/AT; PERRL, anicteric sclera; MMM  Neck: supple  Cardiovascular: +S1/S2; RRR  Respiratory: CTA B/L; no W/R/R  Gastrointestinal: soft, NT/ND; +BSx4  Extremities: WWP; no edema, clubbing or cyanosis  Vascular: 2+ radial, DP/PT pulses B/L  Neurological: AAOx3; no focal deficits    MEDICATIONS:  MEDICATIONS  (STANDING):  acetaminophen     Tablet .. 650 milliGRAM(s) Oral every 6 hours  artificial tears (preservative free) Ophthalmic Solution 1 Drop(s) Both EYES two times a day  buDESOnide    Inhalation Suspension 0.5 milliGRAM(s) Inhalation two times a day  dextrose 40% Gel 15 Gram(s) Oral once  dextrose 5% + lactated ringers. 1000 milliLiter(s) (45 mL/Hr) IV Continuous <Continuous>  dextrose 5%. 1000 milliLiter(s) (100 mL/Hr) IV Continuous <Continuous>  dextrose 5%. 1000 milliLiter(s) (50 mL/Hr) IV Continuous <Continuous>  dextrose 50% Injectable 25 Gram(s) IV Push once  dextrose 50% Injectable 12.5 Gram(s) IV Push once  dextrose 50% Injectable 25 Gram(s) IV Push once  enoxaparin Injectable 30 milliGRAM(s) SubCutaneous every 24 hours  escitalopram 5 milliGRAM(s) Oral at bedtime  glucagon  Injectable 1 milliGRAM(s) IntraMuscular once  insulin lispro (ADMELOG) corrective regimen sliding scale   SubCutaneous three times a day before meals  levothyroxine Injectable 66 MICROGram(s) IV Push at bedtime  lidocaine   4% Patch 1 Patch Transdermal every 24 hours  pantoprazole  Injectable 40 milliGRAM(s) IV Push daily  polyethylene glycol 3350 17 Gram(s) Oral daily    MEDICATIONS  (PRN):      ALLERGIES:  Allergies    Kiwi (Other)  No Known Drug Allergies  Nuts (Rash)  pitted fruits (Hives)  tree fruit (Unknown)  Tree Nuts (Unknown)    Intolerances        LABS:                        10.3   5.67  )-----------( 243      ( 28 Dec 2021 06:54 )             32.3     12-28    139  |  105  |  19  ----------------------------<  87  3.5   |  26  |  0.58    Ca    8.9      28 Dec 2021 06:54  Phos  3.1     12-28  Mg     1.9     12-28    TPro  6.4  /  Alb  3.0<L>  /  TBili  0.6  /  DBili  x   /  AST  41<H>  /  ALT  38  /  AlkPhos  285<H>  12-27    PT/INR - ( 28 Dec 2021 06:54 )   PT: 11.7 sec;   INR: 0.97          PTT - ( 28 Dec 2021 06:54 )  PTT:35.9 sec    CAPILLARY BLOOD GLUCOSE      POCT Blood Glucose.: 94 mg/dL (27 Dec 2021 19:51)      RADIOLOGY & ADDITIONAL TESTS: Reviewed.    ASSESSMENT:    PLAN:  OVERNIGHT EVENTS: MELONIE    SUBJECTIVE / INTERVAL HPI: Patient seen and examined at bedside. Says she is nervous about EGD. Denies fevers, chills, SOB, chest pain, abd pain.    VITAL SIGNS:  Vital Signs Last 24 Hrs  T(C): 37.1 (28 Dec 2021 05:36), Max: 37.1 (28 Dec 2021 05:36)  T(F): 98.7 (28 Dec 2021 05:36), Max: 98.7 (28 Dec 2021 05:36)  HR: 65 (28 Dec 2021 05:36) (60 - 85)  BP: 165/86 (28 Dec 2021 05:36) (148/92 - 181/94)  BP(mean): --  RR: 16 (28 Dec 2021 05:36) (16 - 18)  SpO2: 94% (28 Dec 2021 05:36) (93% - 96%)    PHYSICAL EXAM:    General: cachectic   HEENT: NC/AT; PERRL, anicteric sclera; MMM  Neck: supple  Cardiovascular: +S1/S2; RRR  Respiratory: CTA B/L; no W/R/R  Gastrointestinal: soft, NT/ND; +BSx4  Extremities: WWP; no edema, clubbing or cyanosis  Vascular: 2+ radial, DP/PT pulses B/L  Neurological: AAOx3; CN II-XII grossly intact, 5/5 muscle strength; unclear if sensation intact; patellar and achilles reflexes +2  Derm: stage I sacral pressure ulcer    MEDICATIONS:  MEDICATIONS  (STANDING):  acetaminophen     Tablet .. 650 milliGRAM(s) Oral every 6 hours  artificial tears (preservative free) Ophthalmic Solution 1 Drop(s) Both EYES two times a day  buDESOnide    Inhalation Suspension 0.5 milliGRAM(s) Inhalation two times a day  dextrose 40% Gel 15 Gram(s) Oral once  dextrose 5% + lactated ringers. 1000 milliLiter(s) (45 mL/Hr) IV Continuous <Continuous>  dextrose 5%. 1000 milliLiter(s) (100 mL/Hr) IV Continuous <Continuous>  dextrose 5%. 1000 milliLiter(s) (50 mL/Hr) IV Continuous <Continuous>  dextrose 50% Injectable 25 Gram(s) IV Push once  dextrose 50% Injectable 12.5 Gram(s) IV Push once  dextrose 50% Injectable 25 Gram(s) IV Push once  enoxaparin Injectable 30 milliGRAM(s) SubCutaneous every 24 hours  escitalopram 5 milliGRAM(s) Oral at bedtime  glucagon  Injectable 1 milliGRAM(s) IntraMuscular once  insulin lispro (ADMELOG) corrective regimen sliding scale   SubCutaneous three times a day before meals  levothyroxine Injectable 66 MICROGram(s) IV Push at bedtime  lidocaine   4% Patch 1 Patch Transdermal every 24 hours  pantoprazole  Injectable 40 milliGRAM(s) IV Push daily  polyethylene glycol 3350 17 Gram(s) Oral daily    MEDICATIONS  (PRN):      ALLERGIES:  Allergies    Kiwi (Other)  No Known Drug Allergies  Nuts (Rash)  pitted fruits (Hives)  tree fruit (Unknown)  Tree Nuts (Unknown)    Intolerances        LABS:                        10.3   5.67  )-----------( 243      ( 28 Dec 2021 06:54 )             32.3     12-28    139  |  105  |  19  ----------------------------<  87  3.5   |  26  |  0.58    Ca    8.9      28 Dec 2021 06:54  Phos  3.1     12-28  Mg     1.9     12-28    TPro  6.4  /  Alb  3.0<L>  /  TBili  0.6  /  DBili  x   /  AST  41<H>  /  ALT  38  /  AlkPhos  285<H>  12-27    PT/INR - ( 28 Dec 2021 06:54 )   PT: 11.7 sec;   INR: 0.97          PTT - ( 28 Dec 2021 06:54 )  PTT:35.9 sec    CAPILLARY BLOOD GLUCOSE      POCT Blood Glucose.: 94 mg/dL (27 Dec 2021 19:51)      RADIOLOGY & ADDITIONAL TESTS: Reviewed.    ASSESSMENT:    PLAN:  Hospital course:  80 yo F with PMH CVA x3, dementia (AOx2-3), HTN, HLD, COPD, asthma, hypothyroidism, and recurrent UTIs who p/w frequent falls and sacral pain, found to have sacral fx. Orthopedics consulted, recommended pain control as needed. GI consulted for possible esophageal dilation. EGD showed gastritis and hiatal hernia. Started on PPI BID. Started on amlodipine for HTN.      OVERNIGHT EVENTS: MELONIE    SUBJECTIVE / INTERVAL HPI: Patient seen and examined at bedside. Says she is nervous about EGD. Denies fevers, chills, SOB, chest pain, abd pain.    VITAL SIGNS:  Vital Signs Last 24 Hrs  T(C): 37.1 (28 Dec 2021 05:36), Max: 37.1 (28 Dec 2021 05:36)  T(F): 98.7 (28 Dec 2021 05:36), Max: 98.7 (28 Dec 2021 05:36)  HR: 65 (28 Dec 2021 05:36) (60 - 85)  BP: 165/86 (28 Dec 2021 05:36) (148/92 - 181/94)  BP(mean): --  RR: 16 (28 Dec 2021 05:36) (16 - 18)  SpO2: 94% (28 Dec 2021 05:36) (93% - 96%)    PHYSICAL EXAM:    General: cachectic   HEENT: NC/AT; PERRL, anicteric sclera; MMM  Neck: supple  Cardiovascular: +S1/S2; RRR  Respiratory: CTA B/L; no W/R/R  Gastrointestinal: soft, NT/ND; +BSx4  Extremities: WWP; no edema, clubbing or cyanosis  Vascular: 2+ radial, DP/PT pulses B/L  Neurological: AAOx3; CN II-XII grossly intact, 5/5 muscle strength; unclear if sensation intact; patellar and achilles reflexes +2  Derm: stage I sacral pressure ulcer    MEDICATIONS:  MEDICATIONS  (STANDING):  acetaminophen     Tablet .. 650 milliGRAM(s) Oral every 6 hours  artificial tears (preservative free) Ophthalmic Solution 1 Drop(s) Both EYES two times a day  buDESOnide    Inhalation Suspension 0.5 milliGRAM(s) Inhalation two times a day  dextrose 40% Gel 15 Gram(s) Oral once  dextrose 5% + lactated ringers. 1000 milliLiter(s) (45 mL/Hr) IV Continuous <Continuous>  dextrose 5%. 1000 milliLiter(s) (100 mL/Hr) IV Continuous <Continuous>  dextrose 5%. 1000 milliLiter(s) (50 mL/Hr) IV Continuous <Continuous>  dextrose 50% Injectable 25 Gram(s) IV Push once  dextrose 50% Injectable 12.5 Gram(s) IV Push once  dextrose 50% Injectable 25 Gram(s) IV Push once  enoxaparin Injectable 30 milliGRAM(s) SubCutaneous every 24 hours  escitalopram 5 milliGRAM(s) Oral at bedtime  glucagon  Injectable 1 milliGRAM(s) IntraMuscular once  insulin lispro (ADMELOG) corrective regimen sliding scale   SubCutaneous three times a day before meals  levothyroxine Injectable 66 MICROGram(s) IV Push at bedtime  lidocaine   4% Patch 1 Patch Transdermal every 24 hours  pantoprazole  Injectable 40 milliGRAM(s) IV Push daily  polyethylene glycol 3350 17 Gram(s) Oral daily    MEDICATIONS  (PRN):      ALLERGIES:  Allergies    Kiwi (Other)  No Known Drug Allergies  Nuts (Rash)  pitted fruits (Hives)  tree fruit (Unknown)  Tree Nuts (Unknown)    Intolerances        LABS:                        10.3   5.67  )-----------( 243      ( 28 Dec 2021 06:54 )             32.3     12-28    139  |  105  |  19  ----------------------------<  87  3.5   |  26  |  0.58    Ca    8.9      28 Dec 2021 06:54  Phos  3.1     12-28  Mg     1.9     12-28    TPro  6.4  /  Alb  3.0<L>  /  TBili  0.6  /  DBili  x   /  AST  41<H>  /  ALT  38  /  AlkPhos  285<H>  12-27    PT/INR - ( 28 Dec 2021 06:54 )   PT: 11.7 sec;   INR: 0.97          PTT - ( 28 Dec 2021 06:54 )  PTT:35.9 sec    CAPILLARY BLOOD GLUCOSE      POCT Blood Glucose.: 94 mg/dL (27 Dec 2021 19:51)      RADIOLOGY & ADDITIONAL TESTS: Reviewed.    ASSESSMENT:    PLAN:

## 2021-12-28 NOTE — PROGRESS NOTE ADULT - PROBLEM SELECTOR PLAN 2
CT L-spine with severe scoliosis, subtle and non-displaced S4-sacrum fx  - ortho consulted, appreciate recs  - pain mgmt with standing tylenol, lidocaine patch    #Scoliosis  seen on CT LS,   - PT evaluation

## 2021-12-28 NOTE — PROGRESS NOTE ADULT - PROBLEM SELECTOR PLAN 6
- CT L-spine with some stool burden  - start bowel regimen  - continue to monitor  - may be contributing to recurrent UTIs, as below - CT L-spine with some stool burden  - c/w bowel regimen  - continue to monitor

## 2021-12-28 NOTE — DISCHARGE NOTE PROVIDER - DETAILS OF MALNUTRITION DIAGNOSIS/DIAGNOSES
This patient has been assessed with a concern for Malnutrition and was treated during this hospitalization for the following Nutrition diagnosis/diagnoses:     -  12/27/2021: Severe protein-calorie malnutrition   -  12/27/2021: Underweight (BMI < 19)

## 2021-12-28 NOTE — PROGRESS NOTE ADULT - PROBLEM SELECTOR PLAN 11
Hgb 10.4 with normal MCV  - f/u iron studies, B12, folate F: PO  E: replete PRN  N: NPO for now, pending EGD  DVT ppx: lovenox  GI PPx: protonix    FULL CODE    Dispo: [facility]

## 2021-12-28 NOTE — PROGRESS NOTE ADULT - PROBLEM SELECTOR PLAN 7
- per caregiver, pt with hx recurrent UTIs, uses adult diapers and does not notify caregiver need for changing   - chart review with 10/2020 and 9/2021 UCx +klebsiella s/t CTX  - check UA and UCx - pt with hx dementia, baseline AOx2-3  - exam with AOx4 (self, place, time, president) however answers questions inappropriately at times and poor historian    #hypothyroidism  - pt with hx hypothyroidism, on synthroid 88mcg outpt  - c/w home med  - check TSH

## 2021-12-28 NOTE — PROGRESS NOTE ADULT - ASSESSMENT
82 yo F with PMH CVA x3, dementia (AOx2-3), HTN, HLD, COPD, asthma, hypothyroidism, and recurrent UTIs who p/w frequent falls and sacral pain, found to have sacral fx and esophageal dilation.

## 2021-12-28 NOTE — DISCHARGE NOTE PROVIDER - CARE PROVIDER_API CALL
Sukh Sapp  Internal Medicine  [address] [place] [id number]  Phone: [phone]  Fax: ([phone]  Established Patient  Follow Up Time:    Sukh Sapp  INTERNAL MEDICINE  [address] [place] [address], [address] [place] [address]  Phone: [phone]  Fax: (   )    -  Established Patient  Scheduled Appointment: 01/04/2022 03:00 PM

## 2021-12-28 NOTE — PROGRESS NOTE ADULT - PROBLEM SELECTOR PLAN 8
- pt with hx dementia, baseline AOx2-3  - exam with AOx4 (self, place, time, president) however answers questions inappropriately at times and poor historian    #hypothyroidism  - pt with hx hypothyroidism, on synthroid 88mcg outpt  - c/w home med  - check TSH - pt with hx HTN, diet controlled  - /84 on arrival likely in setting of sacral pain  - continue to monitor    #HLD  pt with hx HLD, on atorvastatin 20mg outpt  - c/w home med

## 2021-12-28 NOTE — PROGRESS NOTE ADULT - PROBLEM SELECTOR PLAN 3
#Protein calorie malnutrition  BMI 19. albumin 3.2  - consult nutrition. rest of plan as above  - nutrition consult  - when tolerating PO, would start ensure supplement BID and MV  - palliative consulted, appreciate recs

## 2021-12-28 NOTE — DISCHARGE NOTE PROVIDER - HOSPITAL COURSE
#Discharge: do not delete    80 yo F with PMH CVA x3, dementia (AOx2-3), HTN, HLD, COPD, asthma, hypothyroidism, and recurrent UTIs who p/w frequent falls and sacral pain, found to have sacral fx and esophageal dilation.    #Falls.   Pt p/w 2d multiple mechanical falls and back/sacral pain; no history of similar issue in the past; baseline ambulates with walker but intermittently non-compliant; lives with caretaker/eliu Gould but has had private aides at home for the past week; caregiver concerned that he can't provide adequate care and pt may need [facility]. Falls have been witnessed, most likely mechanical in nature; intermittently compliant with walker. Pt states most recent fall related to walker use and grabbing it incorrectly and losing her balance; no urinary or bowel incontinence or LOC c/f seizure activity. CTH neg. neuro exam benign. CT L-spine with severe scoliosis, subtle and non-displaced S4-sacrum fx. B12, folate WNL. TSH elevated, T4 WNL.  - PT eval  - fall precautions    #Sacral fracture.   CT L-spine with severe scoliosis, subtle and non-displaced S4-sacrum fx  - ortho consulted, appreciate recs  - pain mgmt with standing tylenol, lidocaine patch    #Scoliosis  seen on CT LS,   - PT evaluation.    #FTT (failure to thrive) in adult.   #Protein calorie malnutrition  BMI 19. albumin 3.2  - consult nutrition. rest of plan as above  - nutrition consult  - when tolerating PO, would start ensure supplement BID and MV  - palliative consulted, appreciate recs.    #Esophageal dilatation.   CT L-spine massive dilation of distal esophagus with food consistency materials vs. distended hiatal hernia. CT AP with IV con showing hiatal hernia.   - EGD planned for today  - can tolerate PO diet after EGD  - S+S eval > easy to chew with thin liquids  - GI consulted, appreciate recs    #GERD   seen on esophogram  - c/w protonix 40mg daily.    #Sacral ulcer.   Stage I pressure ulcer noted on backside while turning pt.  - monitor.    #Constipation.   CT L-spine with some stool burden  - c/w bowel regimen  - continue to monitor.    #Dementia.   pt with hx dementia, baseline AOx2-3  - exam with AOx4 (self, place, time, president) however answers questions inappropriately at times and poor historian    #hypothyroidism  - pt with hx hypothyroidism, on synthroid 88mcg outpt. TSH elevated, T4 WNL.  - c/w home med    # HTN (hypertension).  pt with hx HTN, diet controlled  - /84 on arrival likely in setting of sacral pain  - continue to monitor    #HLD  pt with hx HLD, on atorvastatin 20mg outpt  - c/w home med.    #COPD with asthma.  pt with hx COPD and asthma, on ventolin and pulmicort outpt  - c/w home meds    #Lung nodule  1.3 cm nodular parenchymal opacity in the posterior right lower lung seen on CT Lumbar spine, will need further workup as outpatient.    #Normocytic anemia.  Hgb 10.4 with normal MCV  - f/u iron studies, B12, folate    Patient was discharged to:     New medications:   Medications that were stopped:    Items to follow up as outpatient:    Physical exam at the time of discharge:    General: cachectic   HEENT: NC/AT; PERRL, anicteric sclera; MMM  Neck: supple  Cardiovascular: +S1/S2; RRR  Respiratory: CTA B/L; no W/R/R  Gastrointestinal: soft, NT/ND; +BSx4  Extremities: WWP; no edema, clubbing or cyanosis  Vascular: 2+ radial, DP/PT pulses B/L  Neurological: AAOx3; CN II-XII grossly intact, 5/5 muscle strength; unclear if sensation intact; patellar and achilles reflexes +2  Derm: stage I sacral pressure ulcer   #Discharge: do not delete    82 yo F with PMH CVA x3, dementia (AOx2-3), HTN, HLD, COPD, asthma, hypothyroidism, and recurrent UTIs who p/w frequent falls and sacral pain, found to have sacral fx and esophageal dilation.    #Falls.   Pt p/w 2d multiple mechanical falls and back/sacral pain; no history of similar issue in the past; baseline ambulates with walker but intermittently non-compliant; lives with caretaker/eliu Gould but has had private aides at home for the past week; caregiver concerned that he can't provide adequate care and pt may need [facility]. Falls have been witnessed, most likely mechanical in nature; intermittently compliant with walker. Pt states most recent fall related to walker use and grabbing it incorrectly and losing her balance; no urinary or bowel incontinence or LOC c/f seizure activity. CTH neg. neuro exam benign. CT L-spine with severe scoliosis, subtle and non-displaced S4-sacrum fx. B12, folate WNL. TSH elevated, T4 WNL.  - fall precautions    #Sacral fracture.   CT L-spine with severe scoliosis, subtle and non-displaced S4-sacrum fx  - ortho consulted, appreciate recs  - pain mgmt with standing tylenol, lidocaine patch    #Scoliosis  seen on CT LS    #FTT (failure to thrive) in adult.   #Protein calorie malnutrition  BMI 19. albumin 3.2  - nutrition consult  - pureed diet with thick liquids  - palliative consulted, appreciate recs.    #Esophageal dilatation.   CT L-spine massive dilation of distal esophagus with food consistency materials vs. distended hiatal hernia. CT AP with IV con showing hiatal hernia. EGD showing gastritis and hiatal hernia  - c/w protonix 40 PO BID  - GI consulted, appreciate recs    #GERD   seen on esophogram  - c/w protonix 40 PO BID    #Sacral ulcer.   Stage I pressure ulcer noted on backside while turning pt.  - monitor.    #Constipation.   CT L-spine with some stool burden  - c/w bowel regimen  - continue to monitor.    #Dementia.   pt with hx dementia, baseline AOx2-3  - exam with AOx4 (self, place, time, president) however answers questions inappropriately at times and poor historian    #hypothyroidism  - pt with hx hypothyroidism, on synthroid 88mcg outpt. TSH elevated, T4 WNL.  - c/w home med    # HTN (hypertension).  pt with hx HTN, diet controlled, -180s  - c/w amlodipine 5mg daily    #HLD  pt with hx HLD, on atorvastatin 20mg outpt  - c/w home med.    #COPD with asthma.  pt with hx COPD and asthma, on ventolin and pulmicort outpt  - c/w home meds    #Lung nodule  1.3 cm nodular parenchymal opacity in the posterior right lower lung seen on CT Lumbar spine, will need further workup as outpatient.    #Normocytic anemia.  Hgb 10.4 with normal MCV  - f/u iron studies, B12, folate    Patient was discharged to:     New medications: amlodipine 5mg  Medications that were stopped: n/a    Items to follow up as outpatient:    Physical exam at the time of discharge:    General: cachectic   HEENT: NC/AT; PERRL, anicteric sclera; MMM  Neck: supple  Cardiovascular: +S1/S2; RRR  Respiratory: CTA B/L; no W/R/R  Gastrointestinal: soft, NT/ND; +BSx4  Extremities: WWP; no edema, clubbing or cyanosis  Vascular: 2+ radial, DP/PT pulses B/L  Neurological: AAOx3; CN II-XII grossly intact, 5/5 muscle strength; unclear if sensation intact; patellar and achilles reflexes +2  Derm: stage I sacral pressure ulcer   82 yo F with PMH CVA x3, dementia (AOx2-3), HTN, HLD, COPD, asthma, hypothyroidism, and recurrent UTIs who p/w frequent falls and sacral pain, found to have sacral fx and esophageal dilation. Orthopedics consulted, recommended pain control as needed. GI consulted for possible esophageal dilation. EGD showed gastritis and hiatal hernia. Started on PPI BID. Started on amlodipine for HTN. Patient had mildly positive UA however no symptoms present. Patient stable for DC to [facility].     #Mechanical Fall   Pt p/w 2d multiple mechanical falls and back/sacral pain; no history of similar issue in the past; baseline ambulates with walker but intermittently non-compliant; lives with caretaker/eliu Gould but has had private aides at home for the past week; caregiver concerned that he can't provide adequate care and pt may need [facility]. Falls have been witnessed, most likely mechanical in nature; intermittently compliant with walker. Pt states most recent fall related to walker use and grabbing it incorrectly and losing her balance; no urinary or bowel incontinence or LOC c/f seizure activity. CTH neg. neuro exam benign. CT L-spine with severe scoliosis, subtle and non-displaced S4-sacrum fx. B12, folate WNL. TSH elevated, T4 WNL.  - fall precautions in  place  - no surgical intervention per Orthopaedics, weight bearing as tolerated     #Sacral fracture.   CT L-spine with severe scoliosis, subtle and non-displaced S4-sacrum fx  - ortho consulted -- no acute interventions, weight bearing as tolerated   - pain mgmt with standing tylenol, lidocaine patch    #Scoliosis  seen on CT LS  nothing to manage at this time     #FTT (failure to thrive) in adult.   #Protein calorie malnutrition  BMI 19. albumin 3.2  - nutrition consulted -- NPO for now, advance diet as tolerated. Include Ensure Enlive BID (700 kcal, 40g Protein, 360 mL free H20 w/ thickener packets for mildly thickened consistency.   - pureed diet with thick liquids  - palliative consulted, however patient not meeting hospice criteria. Patient's GOC discussed with [place]. Patient remains full code with full interventions and re-admission to hospital as required.     #Esophageal dilatation/ #Hiatal Hernia appreciated on EGD   CT L-spine massive dilation of distal esophagus with food consistency materials vs. distended hiatal hernia. CT AP with IV con showing hiatal hernia. EGD showing gastritis and hiatal hernia  - c/w protonix 40 PO BID for 14 days (12/29-1/12), then change to daily (1/13 onward).   - aspiration precautions to remain  - sit upright while eating   - outpatient CT surgery evaluation for large Hiatal Hernia     #GERD   seen on esophogram  - c/w protonix 40 PO BID    #Sacral ulcer.   Stage I pressure ulcer noted on backside while turning pt.  - monitor.    #Constipation.   CT L-spine with some stool burden  - c/w bowel regimen  - continue to monitor.    #Dementia.   pt with hx dementia, baseline AOx2-3  - exam with AOx4 (self, place, time, president)     #Hypothyroidism  - pt with hx hypothyroidism, on synthroid 88mcg outpt. TSH elevated, T4 WNL.  - c/w home med of synthroid 88mcg     # HTN (hypertension).  pt with hx HTN, diet controlled, -180s  - c/w amlodipine 5mg daily    #HLD  pt with hx HLD, on atorvastatin 20mg outpt  - c/w home med of atorvastatin 20mg     #COPD with asthma.  pt with hx COPD and asthma, on ventolin and pulmicort outpt  - c/w home meds of ventolin and pulmicort     #Lung nodule  1.3 cm nodular parenchymal opacity in the posterior right lower lung seen on CT Lumbar spine, will need further workup as outpatient.  - f/u Pulmonology appt outpatient     #Normocytic anemia.  Hgb 10.4 with normal MCV  - f/u iron studies, B12, folate    Patient was discharged to [facility]     New medications: Amlodipine 5mg, Protonix 40mg BID (12/29-1/12) and then continue with Daily Protonix 40mg from 1/13 onward.   Appointments: Dr. Sukh Sapp (Internal Medicine) on 01/04/21 @ 3:00pm.          82 yo F with PMH CVA x3, dementia (AOx2-3), HTN, HLD, COPD, asthma, hypothyroidism, and recurrent UTIs who p/w frequent falls and sacral pain, found to have sacral fx and esophageal dilation. Orthopedics consulted, recommended pain control as needed. GI consulted for possible esophageal dilation. EGD showed gastritis and hiatal hernia. Started on PPI BID. Started on amlodipine for HTN. Patient had mildly positive UA however no symptoms present. Patient stable for DC to [facility].     #Mechanical Fall   Pt p/w 2d multiple mechanical falls and back/sacral pain; no history of similar issue in the past; baseline ambulates with walker but intermittently non-compliant; lives with caretaker/eliu Shafferory Bryanna but has had private aides at home for the past week; caregiver concerned that he can't provide adequate care and pt may need [facility]. Falls have been witnessed, most likely mechanical in nature; intermittently compliant with walker. Pt states most recent fall related to walker use and grabbing it incorrectly and losing her balance; no urinary or bowel incontinence or LOC c/f seizure activity. CTH neg. neuro exam benign. CT L-spine with severe scoliosis, subtle and non-displaced S4-sacrum fx. B12, folate WNL. TSH elevated, T4 WNL.  - fall precautions in  place  - no surgical intervention per Orthopaedics, weight bearing as tolerated     #Sacral fracture.   CT L-spine with severe scoliosis, subtle and non-displaced S4-sacrum fx  - ortho consulted -- no acute interventions, weight bearing as tolerated   - pain mgmt with standing tylenol, lidocaine patch  - c/w Vitamin D and Calcium Tablet     #Scoliosis  seen on CT LS  nothing to manage at this time     #FTT (failure to thrive) in adult.   #Protein calorie malnutrition  BMI 19. albumin 3.2  - nutrition consulted -- NPO for now, advance diet as tolerated. Include Ensure Enlive BID (700 kcal, 40g Protein, 360 mL free H20 w/ thickener packets for mildly thickened consistency.   - pureed diet with thick liquids  - palliative consulted, however patient not meeting hospice criteria. Patient's GOC discussed with [place]. Patient remains full code with full interventions and re-admission to hospital as required.     #Esophageal dilatation/ #Hiatal Hernia appreciated on EGD   CT L-spine massive dilation of distal esophagus with food consistency materials vs. distended hiatal hernia. CT AP with IV con showing hiatal hernia. EGD showing gastritis and hiatal hernia  - c/w protonix 40 PO BID for 14 days (12/29-1/12), then change to daily (1/13 onward).   - aspiration precautions to remain  - sit upright while eating   - outpatient CT surgery evaluation for large Hiatal Hernia     #GERD   seen on esophogram  - c/w protonix 40 PO BID    #Sacral ulcer.   Stage I pressure ulcer noted on backside while turning pt.  - monitor.    #Constipation.   CT L-spine with some stool burden  - c/w bowel regimen  - continue to monitor.    #Dementia.   pt with hx dementia, baseline AOx2-3  - exam with AOx4 (self, place, time, president)     #Hypothyroidism  - pt with hx hypothyroidism, on synthroid 88mcg outpt. TSH elevated, T4 WNL.  - c/w home med of synthroid 88mcg     # HTN (hypertension).  pt with hx HTN, diet controlled, -180s  - c/w amlodipine 5mg daily    #HLD  pt with hx HLD, on atorvastatin 20mg outpt  - c/w home med of atorvastatin 20mg     #COPD with asthma.  pt with hx COPD and asthma, on ventolin and pulmicort outpt  - c/w home meds of ventolin and pulmicort     #Lung nodule  1.3 cm nodular parenchymal opacity in the posterior right lower lung seen on CT Lumbar spine, will need further workup as outpatient.  - f/u Pulmonology appt outpatient     #Normocytic anemia.  Hgb 10.4 with normal MCV  - f/u iron studies, B12, folate    Patient was discharged to [facility]     New medications: Amlodipine 5mg, Vitamin D/Calcium, Protonix 40mg BID (12/29-1/12) and then continue with Daily Protonix 40mg from 1/13 onward.   Appointments: Dr. Sukh Sapp (Internal Medicine) on 01/04/21 @ 3:00pm.

## 2021-12-28 NOTE — DISCHARGE NOTE PROVIDER - NSDCMRMEDTOKEN_GEN_ALL_CORE_FT
atorvastatin 20 mg oral tablet: 1 tab(s) orally once a day (at bedtime)  montelukast 10 mg oral tablet: 1 tab(s) orally once a day  pantoprazole 40 mg oral delayed release tablet: 1 tab(s) orally once a day (before a meal)  Pulmicort Respules 0.5 mg/2 mL inhalation suspension: 2 milliliter(s) inhaled 2 times a day  Synthroid 88 mcg (0.088 mg) oral tablet: 1 tab(s) orally once a day  ursodiol 500 mg oral tablet: 2 tab(s) orally once a day  Ventolin HFA 90 mcg/inh inhalation aerosol: 2 puff(s) inhaled every 4 hours    acetaminophen 325 mg oral tablet: 2 tab(s) orally every 6 hours, As Needed  atorvastatin 20 mg oral tablet: 1 tab(s) orally once a day (at bedtime)  montelukast 10 mg oral tablet: 1 tab(s) orally once a day  pantoprazole 40 mg oral delayed release tablet: 1 tab(s) orally once a day (before a meal)  Pulmicort Respules 0.5 mg/2 mL inhalation suspension: 2 milliliter(s) inhaled 2 times a day  Synthroid 88 mcg (0.088 mg) oral tablet: 1 tab(s) orally once a day  Ventolin HFA 90 mcg/inh inhalation aerosol: 2 puff(s) inhaled every 4 hours    acetaminophen 325 mg oral tablet: 2 tab(s) orally every 6 hours, As Needed  amLODIPine 5 mg oral tablet: 1 tab(s) orally every 24 hours  atorvastatin 20 mg oral tablet: 1 tab(s) orally once a day (at bedtime)  escitalopram 5 mg oral tablet: 1 tab(s) orally once a day (at bedtime)  levothyroxine 88 mcg (0.088 mg) oral tablet: 1 tab(s) orally every 24 hours  lidocaine 4% topical film: Apply topically to affected area once a day  montelukast 10 mg oral tablet: 1 tab(s) orally once a day  pantoprazole 40 mg oral delayed release tablet: 1 tab(s) orally 2 times a day until 1/12/22. Then swich to 1 tab orally once daily.   polyethylene glycol 3350 oral powder for reconstitution: 17 gram(s) orally once a day  Pulmicort Respules 0.5 mg/2 mL inhalation suspension: 2 milliliter(s) inhaled 2 times a day  Ventolin HFA 90 mcg/inh inhalation aerosol: 2 puff(s) inhaled every 4 hours    acetaminophen 325 mg oral tablet: 2 tab(s) orally every 6 hours, As Needed  amLODIPine 5 mg oral tablet: 1 tab(s) orally every 24 hours  atorvastatin 20 mg oral tablet: 1 tab(s) orally once a day (at bedtime)  escitalopram 5 mg oral tablet: 1 tab(s) orally once a day (at bedtime)  levothyroxine 88 mcg (0.088 mg) oral tablet: 1 tab(s) orally every 24 hours  lidocaine 4% topical film: Apply topically to affected area once a day over Sacrum  lidocaine 4% topical film: Apply topically to affected area once a day  montelukast 10 mg oral tablet: 1 tab(s) orally once a day  pantoprazole 40 mg oral delayed release tablet: 1 tab(s) orally 2 times a day until 1/12/22. Then swich to 1 tab orally once daily.   polyethylene glycol 3350 oral powder for reconstitution: 17 gram(s) orally once a day  Pulmicort Respules 0.5 mg/2 mL inhalation suspension: 2 milliliter(s) inhaled 2 times a day  Ventolin HFA 90 mcg/inh inhalation aerosol: 2 puff(s) inhaled every 4 hours    acetaminophen 325 mg oral tablet: 2 tab(s) orally every 6 hours, As Needed  amLODIPine 5 mg oral tablet: 1 tab(s) orally every 24 hours  atorvastatin 20 mg oral tablet: 1 tab(s) orally once a day (at bedtime)  calcium-vitamin D 250 mg-12.5 mcg (500 intl units) oral tablet, chewable: 1 tab(s) chewed once a day   escitalopram 5 mg oral tablet: 1 tab(s) orally once a day (at bedtime)  levothyroxine 88 mcg (0.088 mg) oral tablet: 1 tab(s) orally every 24 hours  lidocaine 4% topical film: Apply topically to affected area once a day over Sacrum  lidocaine 4% topical film: Apply topically to affected area once a day  montelukast 10 mg oral tablet: 1 tab(s) orally once a day  pantoprazole 40 mg oral delayed release tablet: 1 tab(s) orally 2 times a day until 1/12/22. Then swich to 1 tab orally once daily.   polyethylene glycol 3350 oral powder for reconstitution: 17 gram(s) orally once a day  Pulmicort Respules 0.5 mg/2 mL inhalation suspension: 2 milliliter(s) inhaled 2 times a day  Ventolin HFA 90 mcg/inh inhalation aerosol: 2 puff(s) inhaled every 4 hours

## 2021-12-28 NOTE — DISCHARGE NOTE PROVIDER - NSDCFUADDAPPT_GEN_ALL_CORE_FT
Please bring your Insurance card, Photo ID, Covid-19 vaccination card (if applicable) and Discharge paperwork to the following appointment:    (1) Please follow up with your Primary Care Provider, Dr. Sukh Sapp at [address] [place] [address], [place] [address] on 01/04/2022 at 3:00pm.    Appointment was scheduled by Ms. ZINA Garcia, Referral Coordinator.

## 2021-12-28 NOTE — DISCHARGE NOTE PROVIDER - NSDCCPCAREPLAN_GEN_ALL_CORE_FT
PRINCIPAL DISCHARGE DIAGNOSIS  Diagnosis: Fall  Assessment and Plan of Treatment: You have had a fall. It appears that the cause is “mechanical”. That means that you slipped, tripped or lost your balance.  Health conditions which change your blood pressure, vision, muscle strength and coordination may increase your risk for falls. Medication can also increase your risk if they make you dizzy, weak, or sleepy. To prevent falls, you can stand or sit up slowly, use assistive devices as directed, wear shoes that fit well and have soles that , wear a personal alarm, stay active, and manage your medical conditions. Home safety tips include keeping your path clear, removing small rugs, not walking on wet surfaces, installing bright lights at home, and keeping items you use often on shelves within reach.         SECONDARY DISCHARGE DIAGNOSES  Diagnosis: Sacral fracture  Assessment and Plan of Treatment: Your fall caused a fracture of your sacrum bone. This bone will heal on its own.     PRINCIPAL DISCHARGE DIAGNOSIS  Diagnosis: Fall  Assessment and Plan of Treatment: You have had a fall. It appears that the cause is “mechanical”. That means that you slipped, tripped or lost your balance.  Health conditions which change your blood pressure, vision, muscle strength and coordination may increase your risk for falls. Medication can also increase your risk if they make you dizzy, weak, or sleepy. To prevent falls, you can stand or sit up slowly, use assistive devices as directed, wear shoes that fit well and have soles that , wear a personal alarm, stay active, and manage your medical conditions. Home safety tips include keeping your path clear, removing small rugs, not walking on wet surfaces, installing bright lights at home, and keeping items you use often on shelves within reach.         SECONDARY DISCHARGE DIAGNOSES  Diagnosis: Sacral fracture  Assessment and Plan of Treatment: Your fall caused a fracture of your sacrum bone. This bone will heal on its own.    Diagnosis: HTN (hypertension)  Assessment and Plan of Treatment: Hypertension is the medical term for high blood pressure. Blood pressure refers to the pressure that blood applies to the inner walls of the arteries. Arteries carry blood from the heart to other organs and parts of the body. Untreated high blood pressure increases the strain on the heart and arteries, eventually causing organ damage. High blood pressure increases the risk of heart failure, heart attack (myocardial infarction), stroke, and kidney failure. High blood pressure does not usually cause any symptoms. Please take the following medications:  Amlodipine by mouth once a day       PRINCIPAL DISCHARGE DIAGNOSIS  Diagnosis: Fall  Assessment and Plan of Treatment: You have had a fall. It appears that the cause is “mechanical”. That means that you slipped, tripped or lost your balance.  Health conditions which change your blood pressure, vision, muscle strength and coordination may increase your risk for falls. Medication can also increase your risk if they make you dizzy, weak, or sleepy. To prevent falls, you can stand or sit up slowly, use assistive devices as directed, wear shoes that fit well and have soles that , wear a personal alarm, stay active, and manage your medical conditions. Home safety tips include keeping your path clear, removing small rugs, not walking on wet surfaces, installing bright lights at home, and keeping items you use often on shelves within reach.         SECONDARY DISCHARGE DIAGNOSES  Diagnosis: Sacral fracture  Assessment and Plan of Treatment: Your fall caused a fracture of your sacrum bone. This bone will heal on its own.  You were seen by Orthopaedic surgery while you were here in the hospital and it was determined that there is no active intervention at this time. Please continue to use Tylenol and Lidocaine Patch for pain.    Diagnosis: HTN (hypertension)  Assessment and Plan of Treatment: Hypertension is the medical term for high blood pressure. Blood pressure refers to the pressure that blood applies to the inner walls of the arteries. Arteries carry blood from the heart to other organs and parts of the body. Untreated high blood pressure increases the strain on the heart and arteries, eventually causing organ damage. High blood pressure increases the risk of heart failure, heart attack (myocardial infarction), stroke, and kidney failure. High blood pressure does not usually cause any symptoms. Please take the following medications:  Amlodipine 5mg one tablet by mouth daily.

## 2021-12-28 NOTE — PROGRESS NOTE ADULT - PROBLEM SELECTOR PLAN 9
- pt with hx HTN, diet controlled  - /84 on arrival likely in setting of sacral pain  - continue to monitor    #HLD  pt with hx HLD, on atorvastatin 20mg outpt  - c/w home med - pt with hx COPD and asthma, on ventolin and pulmicort outpt  - c/w home meds    #Lung nodule  1.3 cm nodular parenchymal opacity in the posterior right lower lung seen on CT Lumbar spine, will need further workup as outpatient

## 2021-12-28 NOTE — PROGRESS NOTE ADULT - PROBLEM SELECTOR PLAN 4
- CT L-spine massive dilation of distal esophagus with food consistency materials vs. distended hiatal hernia  - pt currently without c/o choking sensation, reflux, or vomiting  - NPO for now  - S+S eval  - GI consulted, appreciate recs CT L-spine massive dilation of distal esophagus with food consistency materials vs. distended hiatal hernia. CT AP with IV con showing hiatal hernia.   - EGD planned for today  - can tolerate PO diet after EGD  - S+S eval > easy to chew with thin liquids  - GI consulted, appreciate recs    #GERD   seen on esophogram  - c/w protonix 40mg daily

## 2021-12-28 NOTE — DISCHARGE NOTE PROVIDER - PROVIDER TOKENS
PROVIDER:[TOKEN:[[id number]],ESTABLISHEDPATIENT:[T]] FREE:[LAST:[Sekou],FIRST:[Sukh],PHONE:[phone]],FAX:[(   )    -],ADDRESS:[INTERNAL MEDICINE  [address] [place] [address], [address] [place] [address]],SCHEDULEDAPPT:[01/04/2022],SCHEDULEDAPPTTIME:[03:00 PM],ESTABLISHEDPATIENT:[T]]

## 2021-12-28 NOTE — CHART NOTE - NSCHARTNOTEFT_GEN_A_CORE
EGD performed in Endoscopy Suite with Dr Caro and myself, findings as noted below:    Impression:  -A large size hiatal hernia was seen, the Z line was noted at 31 cm, with hiatal narrowing at 38 cm from the incisors. Contrast and food remnants in large hiatal hernia (7 cm), with reflux of contrast into esophagus. Irrigated and suctioned contents however unable to fully clear area 2/2 viscous fluid and food contents. Retroflexion view in the stomach confirmed the size and morphology of the hernia to be Hill Grade II hiatal hernia.   -Diffuse erythema of the mucosa with no bleeding was noted in the whole stomach. These findings are compatible with non-erosive gastritis.   -Food remnants and contrast pooled to gastric fundus, limiting visualization of full mucosa.   -Normal duodenum.      Plan:   -?Protonix 40 mg po bid? ?  ? -?Pureed diet, sit upright when eating, aspiration precautions? ?  ? -?Consider Cardiothoracic surgery evaluation? EGD performed in Endoscopy Suite with Dr Caro and myself, findings as noted below:    Impression:  -A large size hiatal hernia was seen, the Z line was noted at 31 cm, with hiatal narrowing at 38 cm from the incisors. Contrast and food remnants in large hiatal hernia (7 cm), with reflux of contrast into esophagus. Irrigated and suctioned contents however unable to fully clear area 2/2 viscous fluid and food contents. Retroflexion view in the stomach confirmed the size and morphology of the hernia to be Hill Grade II hiatal hernia.   -Diffuse erythema of the mucosa with no bleeding was noted in the whole stomach. These findings are compatible with non-erosive gastritis.   -Food remnants and contrast pooled to gastric fundus, limiting visualization of full mucosa.   -Normal duodenum.      Plan:  -Protonix 40 mg po bid  -Pureed diet, sit upright when eating, aspiration precautions  -Consider Cardiothoracic surgery evaluation

## 2021-12-28 NOTE — PROGRESS NOTE ADULT - PROBLEM SELECTOR PLAN 10
- pt with hx COPD and asthma, on ventolin and pulmicort outpt  - c/w home meds    #Lung nodule  1.3 cm nodular parenchymal opacity in the posterior right lower lung seen on CT Lumbar spine, will need further workup as outpatient Hgb 10.4 with normal MCV  - f/u iron studies, B12, folate

## 2021-12-28 NOTE — PROGRESS NOTE ADULT - PROBLEM SELECTOR PLAN 12
F: PO  E: replete PRN  N: NPO for now  DVT ppx: lovenox  GI PPx: None    FULL CODE    Dispo: [facility]
F: PO  E: replete PRN  N: NPO for now  DVT ppx: lovenox  GI PPx: None    FULL CODE    Dispo: [facility]

## 2021-12-29 VITALS
DIASTOLIC BLOOD PRESSURE: 82 MMHG | HEART RATE: 69 BPM | SYSTOLIC BLOOD PRESSURE: 187 MMHG | TEMPERATURE: 97 F | RESPIRATION RATE: 17 BRPM | OXYGEN SATURATION: 95 %

## 2021-12-29 DIAGNOSIS — Z51.5 ENCOUNTER FOR PALLIATIVE CARE: ICD-10-CM

## 2021-12-29 DIAGNOSIS — R45.89 OTHER SYMPTOMS AND SIGNS INVOLVING EMOTIONAL STATE: ICD-10-CM

## 2021-12-29 DIAGNOSIS — Z71.89 OTHER SPECIFIED COUNSELING: ICD-10-CM

## 2021-12-29 DIAGNOSIS — K44.9 DIAPHRAGMATIC HERNIA WITHOUT OBSTRUCTION OR GANGRENE: ICD-10-CM

## 2021-12-29 DIAGNOSIS — R53.81 OTHER MALAISE: ICD-10-CM

## 2021-12-29 LAB
GLUCOSE BLDC GLUCOMTR-MCNC: 100 MG/DL — HIGH (ref 70–99)
GLUCOSE BLDC GLUCOMTR-MCNC: 105 MG/DL — HIGH (ref 70–99)

## 2021-12-29 PROCEDURE — 97530 THERAPEUTIC ACTIVITIES: CPT

## 2021-12-29 PROCEDURE — 84145 PROCALCITONIN (PCT): CPT

## 2021-12-29 PROCEDURE — 85025 COMPLETE CBC W/AUTO DIFF WBC: CPT

## 2021-12-29 PROCEDURE — 99239 HOSP IP/OBS DSCHRG MGMT >30: CPT

## 2021-12-29 PROCEDURE — 99232 SBSQ HOSP IP/OBS MODERATE 35: CPT | Mod: GC

## 2021-12-29 PROCEDURE — 84100 ASSAY OF PHOSPHORUS: CPT

## 2021-12-29 PROCEDURE — 92610 EVALUATE SWALLOWING FUNCTION: CPT

## 2021-12-29 PROCEDURE — 72220 X-RAY EXAM SACRUM TAILBONE: CPT

## 2021-12-29 PROCEDURE — 82550 ASSAY OF CK (CPK): CPT

## 2021-12-29 PROCEDURE — 84484 ASSAY OF TROPONIN QUANT: CPT

## 2021-12-29 PROCEDURE — 74220 X-RAY XM ESOPHAGUS 1CNTRST: CPT

## 2021-12-29 PROCEDURE — 94640 AIRWAY INHALATION TREATMENT: CPT

## 2021-12-29 PROCEDURE — 99497 ADVNCD CARE PLAN 30 MIN: CPT

## 2021-12-29 PROCEDURE — 85610 PROTHROMBIN TIME: CPT

## 2021-12-29 PROCEDURE — 85730 THROMBOPLASTIN TIME PARTIAL: CPT

## 2021-12-29 PROCEDURE — 80053 COMPREHEN METABOLIC PANEL: CPT

## 2021-12-29 PROCEDURE — 83540 ASSAY OF IRON: CPT

## 2021-12-29 PROCEDURE — 80048 BASIC METABOLIC PNL TOTAL CA: CPT

## 2021-12-29 PROCEDURE — 86850 RBC ANTIBODY SCREEN: CPT

## 2021-12-29 PROCEDURE — 84443 ASSAY THYROID STIM HORMONE: CPT

## 2021-12-29 PROCEDURE — 72131 CT LUMBAR SPINE W/O DYE: CPT | Mod: MA

## 2021-12-29 PROCEDURE — 83735 ASSAY OF MAGNESIUM: CPT

## 2021-12-29 PROCEDURE — 82977 ASSAY OF GGT: CPT

## 2021-12-29 PROCEDURE — 82746 ASSAY OF FOLIC ACID SERUM: CPT

## 2021-12-29 PROCEDURE — 97110 THERAPEUTIC EXERCISES: CPT

## 2021-12-29 PROCEDURE — 70450 CT HEAD/BRAIN W/O DYE: CPT | Mod: MA

## 2021-12-29 PROCEDURE — 82962 GLUCOSE BLOOD TEST: CPT

## 2021-12-29 PROCEDURE — 82553 CREATINE MB FRACTION: CPT

## 2021-12-29 PROCEDURE — 86900 BLOOD TYPING SEROLOGIC ABO: CPT

## 2021-12-29 PROCEDURE — 99285 EMERGENCY DEPT VISIT HI MDM: CPT | Mod: 25

## 2021-12-29 PROCEDURE — 92611 MOTION FLUOROSCOPY/SWALLOW: CPT

## 2021-12-29 PROCEDURE — 83550 IRON BINDING TEST: CPT

## 2021-12-29 PROCEDURE — 84436 ASSAY OF TOTAL THYROXINE: CPT

## 2021-12-29 PROCEDURE — 82607 VITAMIN B-12: CPT

## 2021-12-29 PROCEDURE — 74018 RADEX ABDOMEN 1 VIEW: CPT

## 2021-12-29 PROCEDURE — 99233 SBSQ HOSP IP/OBS HIGH 50: CPT

## 2021-12-29 PROCEDURE — 84466 ASSAY OF TRANSFERRIN: CPT

## 2021-12-29 PROCEDURE — 86901 BLOOD TYPING SEROLOGIC RH(D): CPT

## 2021-12-29 PROCEDURE — 36415 COLL VENOUS BLD VENIPUNCTURE: CPT

## 2021-12-29 PROCEDURE — 87635 SARS-COV-2 COVID-19 AMP PRB: CPT

## 2021-12-29 PROCEDURE — 82728 ASSAY OF FERRITIN: CPT

## 2021-12-29 PROCEDURE — 71046 X-RAY EXAM CHEST 2 VIEWS: CPT

## 2021-12-29 PROCEDURE — 71260 CT THORAX DX C+: CPT

## 2021-12-29 PROCEDURE — 73523 X-RAY EXAM HIPS BI 5/> VIEWS: CPT

## 2021-12-29 PROCEDURE — 81001 URINALYSIS AUTO W/SCOPE: CPT

## 2021-12-29 PROCEDURE — 87086 URINE CULTURE/COLONY COUNT: CPT

## 2021-12-29 PROCEDURE — 97162 PT EVAL MOD COMPLEX 30 MIN: CPT

## 2021-12-29 PROCEDURE — 74230 X-RAY XM SWLNG FUNCJ C+: CPT

## 2021-12-29 RX ORDER — LIDOCAINE 4 G/100G
1 CREAM TOPICAL ONCE
Refills: 0 | Status: COMPLETED | OUTPATIENT
Start: 2021-12-29 | End: 2021-12-29

## 2021-12-29 RX ORDER — ATORVASTATIN CALCIUM 80 MG/1
1 TABLET, FILM COATED ORAL
Qty: 0 | Refills: 0 | DISCHARGE
Start: 2021-12-29

## 2021-12-29 RX ORDER — LIDOCAINE 4 G/100G
1 CREAM TOPICAL
Qty: 0 | Refills: 0 | DISCHARGE
Start: 2021-12-29

## 2021-12-29 RX ORDER — AMLODIPINE BESYLATE 2.5 MG/1
1 TABLET ORAL
Qty: 0 | Refills: 0 | DISCHARGE
Start: 2021-12-29

## 2021-12-29 RX ORDER — ESCITALOPRAM OXALATE 10 MG/1
1 TABLET, FILM COATED ORAL
Qty: 0 | Refills: 0 | DISCHARGE
Start: 2021-12-29

## 2021-12-29 RX ORDER — POLYETHYLENE GLYCOL 3350 17 G/17G
17 POWDER, FOR SOLUTION ORAL
Qty: 0 | Refills: 0 | DISCHARGE
Start: 2021-12-29

## 2021-12-29 RX ORDER — LEVOTHYROXINE SODIUM 125 MCG
1 TABLET ORAL
Qty: 0 | Refills: 0 | DISCHARGE
Start: 2021-12-29

## 2021-12-29 RX ADMIN — Medication 1 DROP(S): at 06:06

## 2021-12-29 RX ADMIN — Medication 650 MILLIGRAM(S): at 10:00

## 2021-12-29 RX ADMIN — LIDOCAINE 1 PATCH: 4 CREAM TOPICAL at 12:42

## 2021-12-29 RX ADMIN — Medication 650 MILLIGRAM(S): at 09:07

## 2021-12-29 RX ADMIN — ENOXAPARIN SODIUM 30 MILLIGRAM(S): 100 INJECTION SUBCUTANEOUS at 06:06

## 2021-12-29 RX ADMIN — PANTOPRAZOLE SODIUM 40 MILLIGRAM(S): 20 TABLET, DELAYED RELEASE ORAL at 09:07

## 2021-12-29 RX ADMIN — Medication 88 MICROGRAM(S): at 06:06

## 2021-12-29 RX ADMIN — Medication 0.5 MILLIGRAM(S): at 09:08

## 2021-12-29 NOTE — PROGRESS NOTE ADULT - PROBLEM SELECTOR PLAN 4
.  . pt's daughter, who used to care for pt,  of drug overdose 2021. endorsed feeling hopeless, prefers to stay home rather than doing new things or getting off couch  - cw lexapro 5 mg PO qHS  - follow up with PCP to titration   - emotional support provided

## 2021-12-29 NOTE — PROGRESS NOTE ADULT - SUBJECTIVE AND OBJECTIVE BOX
SUBJECTIVE: pt seen and examined. sitting up eating breakfast. NAD. More alert and communicative this morning.     OVERNIGHT EVENTS: sp EGD yesterday with hiatal hernia, non erosive gastritis.     DNR in chart: Yes, discussed with HCP today, completed and placed in physical chart     If  [ ] blank, symptom not present  Dyspnea:                           [ ]Mild [ ]Moderate [ ]Severe  Anxiety:                             [ ]Mild [ ]Moderate [ ]Severe  Fatigue:                             [ x]Mild [ ]Moderate [ ]Severe  Nausea:                             [ ]Mild [ ]Moderate [ ]Severe  Loss of appetite:              [ ]Mild [ ]Moderate [ ]Severe  Constipation:                    [ ]Mild [ ]Moderate [ ]Severe  Grief Present                    [x ] Yes   [ ] No     Pain  Location :      sacrum, coccyx  Quality: sharp  Aggravating factors: pressure, certain positions   Current score (0-10 scale): 3/10  Improves with: "I don't know"       Rest of 12 point review of systems negative unless documented otherwise in note.    PEx:  T(C): 36.3 (12-29-21 @ 08:19), Max: 36.9 (12-29-21 @ 05:28)  HR: 69 (12-29-21 @ 08:19) (63 - 78)  BP: 160/82 (12-29-21 @ 08:19) (138/86 - 187/100)  RR: 17 (12-29-21 @ 08:19) (17 - 18)  SpO2: 95% (12-29-21 @ 08:19) (94% - 96%)  Wt(kg): --    GEN: frail elderly woman lying in bed, NAD  HEENT: atraumatic, trace temporal wasting, MMM  RESP: Regular rhythm, no accessory muscle use, CTAB, diminished bilat bases  CARD: No tachycardia, regular rhythm, s1/s2  GI: soft, non distended, non tender with light palpation, normoactive BSx4  EXTR: No cyanosis, No edema  NEURO:  oriented name, LHH, month/year, situation, more alert compared to previous exam  PSYCH: tearful when reflecting on loss of her daughter   SKIN: stage I sacral pressure ulcer   MUSK: generalized loss of adipose tissue, protruding clavicles, ribs     ALLERGIES: Kiwi (Other)  No Known Drug Allergies  Nuts (Rash)  pitted fruits (Hives)  tree fruit (Unknown)  Tree Nuts (Unknown)      MEDICATIONS: REVIEWED  MEDICATIONS  (STANDING):  acetaminophen     Tablet .. 650 milliGRAM(s) Oral every 6 hours  amLODIPine   Tablet 5 milliGRAM(s) Oral every 24 hours  artificial tears (preservative free) Ophthalmic Solution 1 Drop(s) Both EYES two times a day  atorvastatin 20 milliGRAM(s) Oral at bedtime  buDESOnide    Inhalation Suspension 0.5 milliGRAM(s) Inhalation two times a day  dextrose 40% Gel 15 Gram(s) Oral once  dextrose 5%. 1000 milliLiter(s) (100 mL/Hr) IV Continuous <Continuous>  dextrose 5%. 1000 milliLiter(s) (50 mL/Hr) IV Continuous <Continuous>  dextrose 50% Injectable 25 Gram(s) IV Push once  dextrose 50% Injectable 12.5 Gram(s) IV Push once  dextrose 50% Injectable 25 Gram(s) IV Push once  enoxaparin Injectable 30 milliGRAM(s) SubCutaneous every 24 hours  escitalopram 5 milliGRAM(s) Oral at bedtime  glucagon  Injectable 1 milliGRAM(s) IntraMuscular once  insulin lispro (ADMELOG) corrective regimen sliding scale   SubCutaneous three times a day before meals  levothyroxine 88 MICROGram(s) Oral every 24 hours  lidocaine   4% Patch 1 Patch Transdermal every 24 hours  pantoprazole    Tablet 40 milliGRAM(s) Oral every 12 hours  polyethylene glycol 3350 17 Gram(s) Oral daily    MEDICATIONS  (PRN):    LABS: REVIEWED  CBC:                        10.3   5.67  )-----------( 243      ( 28 Dec 2021 06:54 )             32.3     CMP:    12-28    139  |  105  |  19  ----------------------------<  87  3.5   |  26  |  0.58    Ca    8.9      28 Dec 2021 06:54  Phos  3.1     12-28  Mg     1.9     12-28      IMAGING: REVIEWED    < from: Xray Cinesophagram Swallow Function w/ Contrast (12.27.21 @ 15:07) >  IMPRESSION:  As above. Please see speech pathology report inthe patient's chart for   complete details regarding swallow function.    < end of copied text >  < from: Xray Esophagram Single Contrast (12.27.21 @ 15:54) >    IMPRESSION:  1. As on studies dating back to 1/23/2019, a large type I hiatal hernia   is present.    2. Gastroesophageal reflux was seen.    < end of copied text >      Decision maker: The patient is able to participate in complex medical decision making conversations.   Channing Gould is pt's HCP, Channing to provide documentation to be scanned into physical chart     ADVANCE DIRECTIVES & GOALS OF CARE  - DNR/DNI  - MOLST completed and placed in physical chart  - CHIDI and return home with caregiver support +/- HHA provided through insurance   - Per HCP: he does not want pt to follow up with CT surgery as he believes surgery is too invasive and the risk for further deconditioning or debility would result in an unacceptable QOL for pt.    PSYCHOSOCIAL ASSESSMENT:  - Coping: [ ] well [x ] with difficulty [ ] poor coping   - Existential distress, complex grief following death of daughter from overdose 7/2021  - HCP and caregiver Channing Gould     REFERRALS:  [ ] palliative social work [ ]Chaplaincy  [ ]Hospice  [ ]Child Life  [ x]Social Work  [x ]Case management [  ]Holistic Therapy

## 2021-12-29 NOTE — PROGRESS NOTE ADULT - CONVERSATION DETAILS
Pts chart reviewed. Reached out to pt's reported HCP Channing Bryanna for support. Channing to provide physical HCP and Living Will documentation to be scanned into chart.     Provided medical updates, including EDG findings of hiatal hernia and non erosive gastritis. Channing does not wish to follow up outpatient with CT surgery as he believes surgical intervention in setting of pts debility would be too invasive; and the risk for further deconditioning or debility would result in an unacceptable QOL for pt.     Reviewed signs of decline to look for that would prompt a home hospice referral for pt's advancing dementia; for now pt would be good candidate for house calls program.     Code status re-visited. HCP electing to allow a natural death in the event of card/resp arrest. Verbal consent obtained to complete DNR DNI MOLST.     Dispo to [facility] and then return home to [facility], ideally with additional HHA services. Emotional support provided. All questions answered.    Total time: 30 min

## 2021-12-29 NOTE — PROGRESS NOTE ADULT - PROBLEM SELECTOR PLAN 7
- pt with hx dementia, baseline AOx2-3  - exam with AOx4 (self, place, time, president)    #hypothyroidism  - pt with hx hypothyroidism, on synthroid 88mcg outpt  - c/w home med  - check TSH

## 2021-12-29 NOTE — PROGRESS NOTE ADULT - PROBLEM SELECTOR PLAN 2
CT L-spine with severe scoliosis, subtle and non-displaced S4-sacrum fx  - ortho consulted, no acute intervention or further imaging needed, WBAT w/ walker   - pain mgmt with standing tylenol, lidocaine patch    #Scoliosis  seen on CT LS,   - PT evaluation as above

## 2021-12-29 NOTE — PROGRESS NOTE ADULT - PROBLEM SELECTOR PLAN 1
.  frequent falls.  CT L-spine with severe scoliosis, subtle and non-displaced S4-sacrum fx. per HCP, pt ambulatory with walker, however spends >50% of day sedentary, refusing to walk or ambulate not due to pain but iso complex grief, depressed mood. pt does not appear to be able to safely care for self without 24/7 care. eating, fair appetite but needs help w meal set up. ambulatory with walker w PT   - pending transfer to [facility]   - assist with ADLs, encourage PO, OOB as able   - depressed mood plan as below  - scheduled tylenol BID for sacral pain and PRN

## 2021-12-29 NOTE — PROGRESS NOTE ADULT - PROBLEM SELECTOR PLAN 8
.  - pt awaiting discharge to [facility]. [facility] established; Symptoms managed.   - Coping: [ ] well [x ] with difficulty [ ] poor coping   - Support system: [ ] strong [ x] adequate [ ] inadequate  - All questions answered, emotional support provided  -  primary team   - Please contact Palliative Medicine 24/7 at [phone] for any acute symptoms or questions   - Thank you for this consult. Palliative medicine will sign off. Please reconsult if the patient's symptoms and/or goals of care change, need to be readdressed, or if patient is readmitted in the future.

## 2021-12-29 NOTE — PROGRESS NOTE ADULT - PROBLEM SELECTOR PLAN 11
F: PO  E: replete PRN  N: pureed diet, sit upright while eating   DVT ppx: lovenox  GI PPx: protonix    FULL CODE    Dispo: F

## 2021-12-29 NOTE — PROGRESS NOTE ADULT - PROBLEM SELECTOR PLAN 5
.  CT L-spine massive dilation of distal esophagus with food consistency materials vs. distended hiatal hernia. sp EGD on 12/28 cw hiatal hernia and non erosive gastritis   - GI: rec fu with CT surgery  - GOC as above: HCP does not wish to pursue surgical intervention

## 2021-12-29 NOTE — PROGRESS NOTE ADULT - PROBLEM SELECTOR PLAN 4
EGD on 12/28 showing Grade II Hiatal Hernia, diffuse erythema of mucosa w/ no bleeding in stomach lining consistent with non-erosive gastritis.   - c/w Protonix 40mg PO BID   - c/w pureed diet, sit upright when eating  - aspiration precautions   - consider outpatient CT surgery evaluation per GI     #GERD  Likely 2/2 Hiatal hernia   - c/w Protonix 40mg PO BID as above

## 2021-12-29 NOTE — DISCHARGE NOTE NURSING/CASE MANAGEMENT/SOCIAL WORK - PATIENT PORTAL LINK FT
You can access the FollowMyHealth Patient Portal offered by [facility] by registering at the following website: [email or web address]. By joining [employer]’s FollowMyHealth portal, you will also be able to view your health information using other applications (apps) compatible with our system.

## 2021-12-29 NOTE — PROGRESS NOTE ADULT - PROBLEM SELECTOR PLAN 7
.  - Channing Gould #[phone] to provide HCP and Living Will documentation   - discussion as above: DNR DNI  - Minimize invasive interventions, No surgery   - dispo CHIDI followed by discharge home

## 2021-12-29 NOTE — PROGRESS NOTE ADULT - ASSESSMENT
80 yo F with PMH CVA x3, moderate dementia, HTN, HLD, COPD, asthma, hypothyroidism, and recurrent UTIs who presented 12/27 with frequent falls and sacral pain, found to have sacral fx and esophageal dilation. Palliative care following for complex decision making in setting of chronic illness/dementia.

## 2021-12-29 NOTE — DISCHARGE NOTE NURSING/CASE MANAGEMENT/SOCIAL WORK - NSDCPEFALRISK_GEN_ALL_CORE
For information on Fall & Injury Prevention, visit: [email or web address] OR  [email or web address] OR  https://www.cdc.gov/steadi/patient.html

## 2021-12-29 NOTE — PROGRESS NOTE ADULT - ASSESSMENT
per Internal Medicine    80 yo F with PMH CVA x3, dementia (AOx2-3), HTN, HLD, COPD, asthma, hypothyroidism, and recurrent UTIs who p/w frequent falls and sacral pain, found to have sacral fx and esophageal dilation. Pt underwent EGD with GI, found to have hiatal hernia. Started on PO BID Protonix.       Problem/Plan - 1:  ·  Problem: Falls.   ·  Plan: Pt p/w 2d multiple mechanical falls and back/sacral pain; no history of similar issue in the past; baseline ambulates with walker but intermittently non-compliant; lives with caretaker/manolo Gould but has had private aides at home for the past week; caregiver concerned that he can't provide adequate care and pt may need CHIDI. Pt states most recent fall related to walker use and grabbing it incorrectly and losing her balance; no urinary or bowel incontinence or LOC c/f seizure activity. CTH neg. neuro exam benign. CT L-spine with severe scoliosis, subtle and non-displaced S4-sacrum fx  - PT eval CHIDI v home w/ HPT and 24/7 aide -- dispo conversation pending as Manolo unsure of ability to care for patient   - fall precautions in place   - B12 and folate WNL.    Problem/Plan - 2:  ·  Problem: Sacral fracture.   ·  Plan: CT L-spine with severe scoliosis, subtle and non-displaced S4-sacrum fx  - ortho consulted, no acute intervention or further imaging needed, WBAT w/ walker   - pain mgmt with standing tylenol, lidocaine patch    #Scoliosis  seen on CT LS,   - PT evaluation as above.    Problem/Plan - 3:  ·  Problem: FTT (failure to thrive) in adult.   ·  Plan: #Protein calorie malnutrition  BMI 19. albumin 3.2  - consult nutrition. rest of plan as above  - nutrition consult  - when tolerating PO, would start ensure supplement BID and MV  - palliative consulted -- not candidate for home hospice at this time. Pending CHIDI v Home with additional care. Currently full code.    Problem/Plan - 4:  ·  Problem: Esophageal dilatation.   ·  Plan: EGD on 12/28 showing Grade II Hiatal Hernia, diffuse erythema of mucosa w/ no bleeding in stomach lining consistent with non-erosive gastritis.   - c/w Protonix 40mg PO BID   - c/w pureed diet, sit upright when eating  - aspiration precautions   - consider outpatient CT surgery evaluation per GI     #GERD  Likely 2/2 Hiatal hernia   - c/w Protonix 40mg PO BID as above.    Problem/Plan - 5:  ·  Problem: Sacral ulcer.   ·  Plan: Stage I pressure ulcer noted on backside while turning pt.  - monitoring.    Problem/Plan - 6:  ·  Problem: Constipation.   ·  Plan: - CT L-spine with some stool burden  - c/w bowel regimen  - continue to monitor.    Problem/Plan - 7:  ·  Problem: Dementia.   ·  Plan: - pt with hx dementia, baseline AOx2-3  - exam with AOx4 (self, place, time, president)    #hypothyroidism  - pt with hx hypothyroidism, on synthroid 88mcg outpt  - c/w home med  - check TSH.    Problem/Plan - 8:  ·  Problem: HTN (hypertension).   ·  Plan: - pt with hx HTN, diet controlled  - /84 on arrival likely in setting of sacral pain  - continue to monitor    #HLD  pt with hx HLD, on atorvastatin 20mg outpt  - c/w home med.    Problem/Plan - 9:  ·  Problem: COPD with asthma.   ·  Plan: - pt with hx COPD and asthma, on ventolin and pulmicort outpt  - c/w home meds    #Lung nodule  1.3 cm nodular parenchymal opacity in the posterior right lower lung seen on CT Lumbar spine, will need further workup as outpatient.    Problem/Plan - 10:  ·  Problem: Normocytic anemia.   ·  Plan; Hgb 10.4 with normal MCV  - b12 and folate WNL.    Problem/Plan - 11:  ·  Problem: Nutrition, metabolism, and development symptoms.   ·  Plan: F: PO  E: replete PRN  N: pureed diet, sit upright while eating   DVT ppx: lovenox  GI PPx: protonix    FULL CODE    [facility]

## 2021-12-29 NOTE — PROGRESS NOTE ADULT - SUBJECTIVE AND OBJECTIVE BOX
Physical Medicine and Rehabilitation Progress Note:    Patient is a 81y old  Female who presents with a chief complaint of Falls (29 Dec 2021 08:38)      HPI:  Pt is an 80 yo F with PMH CVA x3, dementia (AOx3), HTN, HLD, COPD, asthma, hypothyroidism, and recurrent UTIs who p/w frequent falls and sacral/back pain x2d. Lives with her godson/caregiver who has been taking care of her for over a decade. Majority of history obtained from caretaker Channing Gould, as pt is a poor historian. Per Channing, pt has had 3 falls in the last 24h, all witnessed and likely in setting of gait instability. Intermittently walks with a walker, but had a third fall 12/26 and since had been complaining of sacral/tailbone pain. He had her sit down but she repeatedly stood up d/t pain. Because the pain would not go away despite tylenol and rest, Channing sent her to the hospital. He states that she does not have a history of similar falls and is at baseline functional and mostly independent. For the last week he has hired other caregivers to watch after her. Has a history of recurrent UTIs as she wears adult diapers and does not notify anyone of when she has urinated. No recent changes to medication. At baseline has poor appetite and eats little. No documentation of COVID vaccination and pt does not recall. ROS otherwise negative. No other concerns or complaints.    On arrival, T 98.3, HR 80, /84, RR 18 O2sat 99% RA. EKG pending. Labs with WBC 10.48, 86% neutrophils, Hb 10.4, MCV 89.8, alb 3.2, , AST 51, CK neg. COVID neg. CXR without any acute infiltrate. CTH neg. CT L-spine with degenerative changes, severe scoliosis, neg fx, massive dilation of distal esophagus with food consistency materials vs. distended hiatal hernia. Pt given tylenol. Admitted to [facility] for further observation and management.  (27 Dec 2021 01:02)                            10.3   5.67  )-----------( 243      ( 28 Dec 2021 06:54 )             32.3       12-28    139  |  105  |  19  ----------------------------<  87  3.5   |  26  |  0.58    Ca    8.9      28 Dec 2021 06:54  Phos  3.1     12-28  Mg     1.9     12-28      Vital Signs Last 24 Hrs  T(C): 36.3 (29 Dec 2021 08:19), Max: 36.9 (29 Dec 2021 05:28)  T(F): 97.4 (29 Dec 2021 08:19), Max: 98.4 (29 Dec 2021 05:28)  HR: 69 (29 Dec 2021 08:19) (63 - 78)  BP: 160/82 (29 Dec 2021 08:19) (138/86 - 187/100)  BP(mean): --  RR: 17 (29 Dec 2021 08:19) (17 - 18)  SpO2: 95% (29 Dec 2021 08:19) (94% - 96%)    MEDICATIONS  (STANDING):  acetaminophen     Tablet .. 650 milliGRAM(s) Oral every 6 hours  amLODIPine   Tablet 5 milliGRAM(s) Oral every 24 hours  artificial tears (preservative free) Ophthalmic Solution 1 Drop(s) Both EYES two times a day  atorvastatin 20 milliGRAM(s) Oral at bedtime  buDESOnide    Inhalation Suspension 0.5 milliGRAM(s) Inhalation two times a day  dextrose 40% Gel 15 Gram(s) Oral once  dextrose 5%. 1000 milliLiter(s) (100 mL/Hr) IV Continuous <Continuous>  dextrose 5%. 1000 milliLiter(s) (50 mL/Hr) IV Continuous <Continuous>  dextrose 50% Injectable 25 Gram(s) IV Push once  dextrose 50% Injectable 12.5 Gram(s) IV Push once  dextrose 50% Injectable 25 Gram(s) IV Push once  enoxaparin Injectable 30 milliGRAM(s) SubCutaneous every 24 hours  escitalopram 5 milliGRAM(s) Oral at bedtime  glucagon  Injectable 1 milliGRAM(s) IntraMuscular once  insulin lispro (ADMELOG) corrective regimen sliding scale   SubCutaneous three times a day before meals  levothyroxine 88 MICROGram(s) Oral every 24 hours  lidocaine   4% Patch 1 Patch Transdermal every 24 hours  pantoprazole    Tablet 40 milliGRAM(s) Oral every 12 hours  polyethylene glycol 3350 17 Gram(s) Oral daily    MEDICATIONS  (PRN):    Currently Undergoing Physical/ Occupational Therapy at bedside.    Functional Status Assessment:   12/28/2021      Cognitive/Neuro/Behavioral  Level of Consciousness: confused;  somnolent  Arousal Level: arouses to voice  Speech: clear  Mood/Behavior: hypoactive (quiet, withdrawn);  cooperative;  calm;  behavior appropriate to situation    Language Assistance  Preferred Language to Address Healthcare Preferred Language to Address Healthcare: English    Therapeutic Interventions      Bed Mobility  Bed Mobility Training Rolling/Turning: moderate assist (50% patient effort);  1 person assist  Bed Mobility Training Limitations: decreased ability to use arms for pushing/pulling;  decreased ability to use legs for bridging/pushing;  impaired ability to control trunk for mobility;  decreased strength;  pain    Therapeutic Exercise  Therapeutic Exercise Detail: Supine AROM ankle pumps, Supine AAROM knee flex/ext 1x10, Supine AAROM hip abd 1x10, Supine BUE shoulder flex 1x10, Supine BUE shoulder retractions [id number] hold, Supine Quad Sets 1x5x3 sec hold.           PM&R Impression: as above    Current Disposition Plan :    subacute rehab placement

## 2021-12-29 NOTE — PROGRESS NOTE ADULT - SUBJECTIVE AND OBJECTIVE BOX
GASTROENTEROLOGY PROGRESS NOTE  Patient seen and examined at bedside. s/p EGD 12/28, see chart note for procedure details.     ROS: Patient with no complaints, no nausea/vomiting, no abdominal pain, no BMs overnight.     PERTINENT REVIEW OF SYSTEMS:  CONSTITUTIONAL: No weakness, fevers or chills  HEENT: No visual changes; No vertigo or throat pain   GASTROINTESTINAL: As above.  NEUROLOGICAL: No numbness or weakness  SKIN: No itching, burning, rashes, or lesions     Allergies    Kiwi (Other)  No Known Drug Allergies  Nuts (Rash)  pitted fruits (Hives)  tree fruit (Unknown)  Tree Nuts (Unknown)    Intolerances      MEDICATIONS:  MEDICATIONS  (STANDING):  acetaminophen     Tablet .. 650 milliGRAM(s) Oral every 6 hours  amLODIPine   Tablet 5 milliGRAM(s) Oral every 24 hours  artificial tears (preservative free) Ophthalmic Solution 1 Drop(s) Both EYES two times a day  atorvastatin 20 milliGRAM(s) Oral at bedtime  buDESOnide    Inhalation Suspension 0.5 milliGRAM(s) Inhalation two times a day  dextrose 40% Gel 15 Gram(s) Oral once  dextrose 5%. 1000 milliLiter(s) (100 mL/Hr) IV Continuous <Continuous>  dextrose 5%. 1000 milliLiter(s) (50 mL/Hr) IV Continuous <Continuous>  dextrose 50% Injectable 25 Gram(s) IV Push once  dextrose 50% Injectable 12.5 Gram(s) IV Push once  dextrose 50% Injectable 25 Gram(s) IV Push once  enoxaparin Injectable 30 milliGRAM(s) SubCutaneous every 24 hours  escitalopram 5 milliGRAM(s) Oral at bedtime  glucagon  Injectable 1 milliGRAM(s) IntraMuscular once  insulin lispro (ADMELOG) corrective regimen sliding scale   SubCutaneous three times a day before meals  levothyroxine 88 MICROGram(s) Oral every 24 hours  lidocaine   4% Patch 1 Patch Transdermal every 24 hours  pantoprazole    Tablet 40 milliGRAM(s) Oral every 12 hours  polyethylene glycol 3350 17 Gram(s) Oral daily    MEDICATIONS  (PRN):    Vital Signs Last 24 Hrs  T(C): 36.3 (29 Dec 2021 08:19), Max: 36.9 (29 Dec 2021 05:28)  T(F): 97.4 (29 Dec 2021 08:19), Max: 98.4 (29 Dec 2021 05:28)  HR: 69 (29 Dec 2021 08:19) (63 - 78)  BP: 160/82 (29 Dec 2021 08:19) (138/86 - 187/100)  BP(mean): --  RR: 17 (29 Dec 2021 08:19) (17 - 18)  SpO2: 95% (29 Dec 2021 08:19) (93% - 96%)    PHYSICAL EXAM:    General: thin, frail appearing woman; lying comfortably in bed; in no acute distress  HEENT: MMM, conjunctiva and sclera clear  Gastrointestinal: Soft, non-tender non-distended; Normal bowel sounds; No rebound or guarding  : Primafit in place  Extremities: Normal range of motion, No clubbing, cyanosis or edema  Neurological: Alert and oriented x3  Skin: Warm and dry. No obvious rash    LABS:                        10.3   5.67  )-----------( 243      ( 28 Dec 2021 06:54 )             32.3     12-28    139  |  105  |  19  ----------------------------<  87  3.5   |  26  |  0.58    Ca    8.9      28 Dec 2021 06:54  Phos  3.1     12-28  Mg     1.9     12-28      PT/INR - ( 28 Dec 2021 06:54 )   PT: 11.7 sec;   INR: 0.97          PTT - ( 28 Dec 2021 06:54 )  PTT:35.9 sec      Urinalysis Basic - ( 28 Dec 2021 14:41 )    Color: x / Appearance: x / SG: x / pH: x  Gluc: x / Ketone: x  / Bili: x / Urobili: x   Blood: x / Protein: x / Nitrite: x   Leuk Esterase: x / RBC: < 5 /HPF / WBC > 10 /HPF   Sq Epi: x / Non Sq Epi: 0-5 /HPF / Bacteria: Many /HPF                Culture - Urine (collected 27 Dec 2021 02:22)  Source: Clean Catch Clean Catch (Midstream)  Final Report (28 Dec 2021 08:03):    Insignificant amount of mixed growth.      RADIOLOGY & ADDITIONAL STUDIES:  Reviewed

## 2021-12-29 NOTE — PROGRESS NOTE ADULT - PROBLEM SELECTOR PLAN 9
- pt with hx COPD and asthma, on ventolin and pulmicort outpt  - c/w home meds    #Lung nodule  1.3 cm nodular parenchymal opacity in the posterior right lower lung seen on CT Lumbar spine, will need further workup as outpatient

## 2021-12-29 NOTE — PROGRESS NOTE ADULT - NUTRITIONAL ASSESSMENT
This patient has been assessed with a concern for Malnutrition and has been determined to have a diagnosis/diagnoses of Severe protein-calorie malnutrition and Underweight (BMI < 19).    This patient is being managed with:   Diet Pureed-  Mildly Thick Liquids (MILDTHICKLIQS)  Entered: Dec 28 2021  2:32PM

## 2021-12-29 NOTE — PROGRESS NOTE ADULT - ASSESSMENT
82 yo F with PMH CVA x3, dementia (AOx3), HTN, HLD, COPD, asthma, hypothyroidism, and recurrent UTIs who p/w frequent falls and sacral/back pain x 2 days, found to have a non-displaced fracture of S4, CT lumbar imaging capturing dilated esophagus with food remnants, GI consulted for abnormal CT findings.    #Dilated esophagus  Presenting to [facility] ED with c/o sacral/back pain in the setting of recent fall, found to have a non-displaced fracture of S4, with lumbar imaging capturing dilated esophagus with food remnants, unclear etiology. S&S evaluation with both pharyngeal and esophageal phase deficits, with coughing on intake of thin liquids, concerning for aspiration. Ddx including achalasia vs pseudoachalasia (in the setting of prior nissen fundoplication surgery vs ?malignancy)  -Esophagram (12/27): large type I hiatal hernia (also noted on prior study in 2019). Gastroesophageal reflux  -CT Chest w/ IV contrast (12/27):  Moderate size hiatal hernia and mildly distended mid and lower thoracic esophagus to level of brandon containing mottled complex fluid and gas, similar to prior studies dating back to January 23, 2019. Diffuse smooth hyperenhancing esophageal mucosa, possible esophagitis.  Small dependent tracheal secretions and redemonstrated findings   suggesting small airways inflammation/infection.  No suspicious adenopathy or definite thoracic metastatic disease.  -EGD (12/28): A large size hiatal hernia was seen, the Z line was noted at 31 cm, with hiatal narrowing at 38 cm from the incisors. Contrast and food remnants in large hiatal hernia (7 cm), with reflux of contrast into esophagus. Irrigated and suctioned contents however unable to fully clear area 2/2 viscous fluid and food contents. Retroflexion view in the stomach confirmed the size and morphology of the hernia to be Hill Grade II hiatal hernia. Diffuse erythema of the mucosa with no bleeding was noted in the whole stomach. These findings are compatible with non-erosive gastritis.   Food remnants and contrast pooled to gastric fundus, limiting visualization of full mucosa. Normal duodenum.   -Protonix 40 mg BID x 14 days, followed by daily  -Recommend CT Surgery evaluation for large hiatal hernia   -Recommend ongoing GOC discussion between family and primary team as currently high aspiration risk   -Maintain aspiration precautions, sit upright when eating   -Pureed diet    Case discussed with svc attending and primary team.     Nora Bhatt DO  Gastroenterology Fellow  Pager: [phone] 80 yo F with PMH CVA x3, dementia (AOx3), HTN, HLD, COPD, asthma, hypothyroidism, and recurrent UTIs who p/w frequent falls and sacral/back pain x 2 days, found to have a non-displaced fracture of S4, CT lumbar imaging capturing dilated esophagus with food remnants, GI consulted for abnormal CT findings.    #Dilated esophagus  Presenting to [facility] ED with c/o sacral/back pain in the setting of recent fall, found to have a non-displaced fracture of S4, with lumbar imaging capturing dilated esophagus with food remnants, unclear etiology. S&S evaluation with both pharyngeal and esophageal phase deficits, with coughing on intake of thin liquids, concerning for aspiration. Ddx including achalasia vs pseudoachalasia (in the setting of prior nissen fundoplication surgery vs ?malignancy)  -Esophagram (12/27): large type I hiatal hernia (also noted on prior study in 2019). Gastroesophageal reflux  -CT Chest w/ IV contrast (12/27):  Moderate size hiatal hernia and mildly distended mid and lower thoracic esophagus to level of brandon containing mottled complex fluid and gas, similar to prior studies dating back to January 23, 2019. Diffuse smooth hyperenhancing esophageal mucosa, possible esophagitis.  Small dependent tracheal secretions and redemonstrated findings   suggesting small airways inflammation/infection.  No suspicious adenopathy or definite thoracic metastatic disease.  -EGD (12/28): A large size hiatal hernia was seen, the Z line was noted at 31 cm, with hiatal narrowing at 38 cm from the incisors. Contrast and food remnants in large hiatal hernia (7 cm), with reflux of contrast into esophagus. Irrigated and suctioned contents however unable to fully clear area 2/2 viscous fluid and food contents. Retroflexion view in the stomach confirmed the size and morphology of the hernia to be Hill Grade II hiatal hernia. Diffuse erythema of the mucosa with no bleeding was noted in the whole stomach, c/w non-erosive gastritis. Food remnants and contrast pooled to gastric fundus, limiting visualization of full mucosa. Normal duodenum.   -Protonix 40 mg BID x 14 days, followed by daily  -Recommend CT Surgery evaluation for large hiatal hernia   -Recommend ongoing GOC discussion between family and primary team as currently high aspiration risk   -Maintain aspiration precautions, sit upright when eating   -Pureed diet    Case discussed with c attending and primary team.     Nora Bhatt DO  Gastroenterology Fellow  Pager: [phone] 80 yo F with PMH CVA x3, dementia (AOx3), HTN, HLD, COPD, asthma, hypothyroidism, and recurrent UTIs who p/w frequent falls and sacral/back pain x 2 days, found to have a non-displaced fracture of S4, CT lumbar imaging capturing dilated esophagus with food remnants, GI consulted for abnormal CT findings.    #Dilated esophagus  Presenting to [facility] ED with c/o sacral/back pain in the setting of recent fall, found to have a non-displaced fracture of S4, with lumbar imaging capturing dilated esophagus with food remnants, unclear etiology. S&S evaluation with both pharyngeal and esophageal phase deficits, with coughing on intake of thin liquids, concerning for aspiration. Ddx including achalasia vs pseudoachalasia (in the setting of prior nissen fundoplication surgery vs ?malignancy)  -Esophagram (12/27): large type I hiatal hernia (also noted on prior study in 2019). Gastroesophageal reflux  -CT Chest w/ IV contrast (12/27):  Moderate size hiatal hernia and mildly distended mid and lower thoracic esophagus to level of brandon containing mottled complex fluid and gas, similar to prior studies dating back to January 23, 2019. Diffuse smooth hyperenhancing esophageal mucosa, possible esophagitis.  Small dependent tracheal secretions and redemonstrated findings   suggesting small airways inflammation/infection.  No suspicious adenopathy or definite thoracic metastatic disease.  -EGD (12/28): A large size hiatal hernia was seen, the Z line was noted at 31 cm, with hiatal narrowing at 38 cm from the incisors. Contrast and food remnants in large hiatal hernia (7 cm), with reflux of contrast into esophagus. Irrigated and suctioned contents however unable to fully clear area 2/2 viscous fluid and food contents. Retroflexion view in the stomach confirmed the size and morphology of the hernia to be Hill Grade II hiatal hernia. Diffuse erythema of the mucosa with no bleeding was noted in the whole stomach, c/w non-erosive gastritis. Food remnants and contrast pooled to gastric fundus, limiting visualization of full mucosa. Normal duodenum.   -Protonix 40 mg BID x 14 days, followed by daily  -Recommend CT Surgery evaluation for large hiatal hernia   -Recommend ongoing GOC discussion between family and primary team as currently high aspiration risk   -Maintain aspiration precautions, sit upright when eating   -Pureed diet    Case discussed with c attending and primary team.     Thank you for allowing us to participate in the care of this patient.  GI will sign off. Please call back with any questions or concerns.     Nora Bhatt DO  Gastroenterology Fellow  Pager: [phone]

## 2021-12-29 NOTE — PROGRESS NOTE ADULT - ATTENDING COMMENTS
S/p EGD yesterday with large hiatal hernia likely cause of pooling of fluid and food in esophagus noted on imaging
Patient was seen and examined at bedside on 12/28/2021 at 9 am. She has no acute complaints. Wants to eat. Denies SOB, CP. ROS is otherwise negative. Vitals, labwork and pertinent imaging reviewed. Physical exam - NAD, AAO x 2 - 3, cachectic, malnourished elderly female, PERRLA, EOMI, MMM, supple neck, chest - CTA b/l, CV - rrr, s1s2, no m/r/g, abd - soft, NTND, + BS, ext - wwp, psych - normal affect    Plan  -GI to perofrm EGD today  -Nutrition consulted  -[facility] planning
Patient was seen and examined at bedside on 12/27/2021 at 9 am  -Agree with history and physical exam as above  -Pt w/o acute complaints, exam as above, Neuro - 5/5 strength in all 4 extremities, sensation intact, pt appears cachectic, malnourished  -GI consult given radiographic finding  -Pt will need disimpaction  -Ortho consult given fx  -PT consult

## 2021-12-29 NOTE — PROGRESS NOTE ADULT - SUBJECTIVE AND OBJECTIVE BOX
CIELO SERRANO, 81y, Female  MRN-[id number]  Patient is a 81y old  Female who presents with a chief complaint of Falls (29 Dec 2021 06:59)    OVERNIGHT EVENTS: NAEO     SUBJECTIVE: Pt seen and evaluated at bedside. Pt responsive and awake. Denies any acute chest pain, abdominal pain, nausea/vomiting. Pt states she has some mild back pain at her coccyx area which has not increased. No acute complaints.     12 Point ROS Negative unless noted otherwise above.  -------------------------------------------------------------------------------  VITAL SIGNS:  Vital Signs Last 24 Hrs  T(C): 36.9 (29 Dec 2021 05:28), Max: 36.9 (29 Dec 2021 05:28)  T(F): 98.4 (29 Dec 2021 05:28), Max: 98.4 (29 Dec 2021 05:28)  HR: 70 (29 Dec 2021 05:28) (63 - 78)  BP: 138/86 (29 Dec 2021 05:28) (138/86 - 187/100)  RR: 18 (29 Dec 2021 05:28) (17 - 18)  SpO2: 94% (29 Dec 2021 05:28) (93% - 96%)  I&O's Summary      PHYSICAL EXAM:    General: thin, frail, laying in bed, speaking in full sentences   HEENT: NC/AT; EOMI, PERRLA, anicteric sclera  Cardiovascular: RRR, +S1/S2; NO M/R/G  Respiratory: CTA B/L; no W/R/R  Gastrointestinal: soft, NT/ND; +BSx4  Extremities: WWP; no edema or cyanosis  Neurological: AAOx3    ALLERGIES:  Allergies    Kiwi (Other)  No Known Drug Allergies  Nuts (Rash)  pitted fruits (Hives)  tree fruit (Unknown)  Tree Nuts (Unknown)    Intolerances    MEDICATIONS:  MEDICATIONS  (STANDING):  acetaminophen     Tablet .. 650 milliGRAM(s) Oral every 6 hours  amLODIPine   Tablet 5 milliGRAM(s) Oral every 24 hours  artificial tears (preservative free) Ophthalmic Solution 1 Drop(s) Both EYES two times a day  atorvastatin 20 milliGRAM(s) Oral at bedtime  buDESOnide    Inhalation Suspension 0.5 milliGRAM(s) Inhalation two times a day  dextrose 40% Gel 15 Gram(s) Oral once  dextrose 5%. 1000 milliLiter(s) (100 mL/Hr) IV Continuous <Continuous>  dextrose 5%. 1000 milliLiter(s) (50 mL/Hr) IV Continuous <Continuous>  dextrose 50% Injectable 25 Gram(s) IV Push once  dextrose 50% Injectable 12.5 Gram(s) IV Push once  dextrose 50% Injectable 25 Gram(s) IV Push once  enoxaparin Injectable 30 milliGRAM(s) SubCutaneous every 24 hours  escitalopram 5 milliGRAM(s) Oral at bedtime  glucagon  Injectable 1 milliGRAM(s) IntraMuscular once  insulin lispro (ADMELOG) corrective regimen sliding scale   SubCutaneous three times a day before meals  levothyroxine 88 MICROGram(s) Oral every 24 hours  lidocaine   4% Patch 1 Patch Transdermal every 24 hours  pantoprazole    Tablet 40 milliGRAM(s) Oral every 12 hours  polyethylene glycol 3350 17 Gram(s) Oral daily    MEDICATIONS  (PRN):  -------------------------------------------------------------------------------  LABS:                        10.3   5.67  )-----------( 243      ( 28 Dec 2021 06:54 )             32.3     12-28    139  |  105  |  19  ----------------------------<  87  3.5   |  26  |  0.58    Ca    8.9      28 Dec 2021 06:54  Phos  3.1     12-28  Mg     1.9     12-28    PT/INR - ( 28 Dec 2021 06:54 )   PT: 11.7 sec;   INR: 0.97       PTT - ( 28 Dec 2021 06:54 )  PTT:35.9 sec  Urinalysis Basic - ( 28 Dec 2021 14:41 )    Color: x / Appearance: x / SG: x / pH: x  Gluc: x / Ketone: x  / Bili: x / Urobili: x   Blood: x / Protein: x / Nitrite: x   Leuk Esterase: x / RBC: < 5 /HPF / WBC > 10 /HPF   Sq Epi: x / Non Sq Epi: 0-5 /HPF / Bacteria: Many /HPF      CAPILLARY BLOOD GLUCOSE      POCT Blood Glucose.: 85 mg/dL (28 Dec 2021 17:26)      Culture - Urine (collected 27 Dec 2021 02:22)  Source: Clean Catch Clean Catch (Midstream)  Final Report (28 Dec 2021 08:03):    Insignificant amount of mixed growth.      COVID-19 PCR: Negative (26 Dec 2021 22:17)  COVID-19 PCR: NotDetec (11 Sep 2021 15:44)      RADIOLOGY & ADDITIONAL TESTS: Reviewed.

## 2021-12-29 NOTE — PROGRESS NOTE ADULT - ASSESSMENT
80 yo F with PMH CVA x3, dementia (AOx2-3), HTN, HLD, COPD, asthma, hypothyroidism, and recurrent UTIs who p/w frequent falls and sacral pain, found to have sacral fx and esophageal dilation. Pt underwent EGD with GI, found to have hiatal hernia. Started on PO BID Protonix.

## 2021-12-29 NOTE — PROGRESS NOTE ADULT - PROBLEM SELECTOR PLAN 3
#Protein calorie malnutrition  BMI 19. albumin 3.2  - consult nutrition. rest of plan as above  - nutrition consult  - when tolerating PO, would start ensure supplement BID and MV  - palliative consulted -- not candidate for home hospice at this time. Pending [facility] Home with additional care. Currently full code.

## 2021-12-29 NOTE — DISCHARGE NOTE NURSING/CASE MANAGEMENT/SOCIAL WORK - NSDCFUADDAPPT_GEN_ALL_CORE_FT
Please bring your Insurance card, Photo ID, Covid-19 vaccination card (if applicable) and Discharge paperwork to the following appointment:    (1) Please follow up with your Primary Care Provider, Dr. Sukh Sapp at [address] [place] [address], [place] [address] on 01/04/2022 at 3:00pm.    Appointment was scheduled by Ms. ZINA Garcia, Referral Coordinator.

## 2021-12-29 NOTE — PROGRESS NOTE ADULT - PROBLEM SELECTOR PLAN 2
.  fecal impaction Xr (12/27). no documented BM  - give suppository today   - start standing MLax qAM   - hold for loose stool  - encourage OOB as able.

## 2021-12-29 NOTE — PROGRESS NOTE ADULT - PROBLEM SELECTOR PLAN 1
Pt p/w 2d multiple mechanical falls and back/sacral pain; no history of similar issue in the past; baseline ambulates with walker but intermittently non-compliant; lives with caretaker/sohaillalit Channing Bryanna but has had private aides at home for the past week; caregiver concerned that he can't provide adequate care and pt may need [facility]. Pt states most recent fall related to walker use and grabbing it incorrectly and losing her balance; no urinary or bowel incontinence or LOC c/f seizure activity. CTH neg. neuro exam benign. CT L-spine with severe scoliosis, subtle and non-displaced S4-sacrum fx  - PT eval [facility] v home w/ HPT and 24/7 aide -- dispo conversation pending as Manolo unsure of ability to care for patient   - fall precautions in place   - B12 and folate WNL

## 2022-01-10 DIAGNOSIS — K59.00 CONSTIPATION, UNSPECIFIED: ICD-10-CM

## 2022-01-10 DIAGNOSIS — K44.9 DIAPHRAGMATIC HERNIA WITHOUT OBSTRUCTION OR GANGRENE: ICD-10-CM

## 2022-01-10 DIAGNOSIS — E43 UNSPECIFIED SEVERE PROTEIN-CALORIE MALNUTRITION: ICD-10-CM

## 2022-01-10 DIAGNOSIS — R62.7 ADULT FAILURE TO THRIVE: ICD-10-CM

## 2022-01-10 DIAGNOSIS — E78.00 PURE HYPERCHOLESTEROLEMIA, UNSPECIFIED: ICD-10-CM

## 2022-01-10 DIAGNOSIS — F32.9 MAJOR DEPRESSIVE DISORDER, SINGLE EPISODE, UNSPECIFIED: ICD-10-CM

## 2022-01-10 DIAGNOSIS — M41.9 SCOLIOSIS, UNSPECIFIED: ICD-10-CM

## 2022-01-10 DIAGNOSIS — J44.9 CHRONIC OBSTRUCTIVE PULMONARY DISEASE, UNSPECIFIED: ICD-10-CM

## 2022-01-10 DIAGNOSIS — R29.6 REPEATED FALLS: ICD-10-CM
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
## 2022-01-10 DIAGNOSIS — R91.1 SOLITARY PULMONARY NODULE: ICD-10-CM

## 2022-01-10 DIAGNOSIS — I10 ESSENTIAL (PRIMARY) HYPERTENSION: ICD-10-CM

## 2022-01-10 DIAGNOSIS — E03.9 HYPOTHYROIDISM, UNSPECIFIED: ICD-10-CM

## 2022-01-10 DIAGNOSIS — K21.9 GASTRO-ESOPHAGEAL REFLUX DISEASE WITHOUT ESOPHAGITIS: ICD-10-CM

## 2022-01-10 DIAGNOSIS — L89.151 PRESSURE ULCER OF SACRAL REGION, STAGE 1: ICD-10-CM

## 2022-01-10 DIAGNOSIS — S32.10XA UNSPECIFIED FRACTURE OF SACRUM, INITIAL ENCOUNTER FOR CLOSED FRACTURE: ICD-10-CM

## 2022-01-10 DIAGNOSIS — N39.0 URINARY TRACT INFECTION, SITE NOT SPECIFIED: ICD-10-CM

## 2022-01-10 DIAGNOSIS — Z91.018 ALLERGY TO OTHER FOODS: ICD-10-CM

## 2022-01-10 DIAGNOSIS — D64.9 ANEMIA, UNSPECIFIED: ICD-10-CM

## 2022-01-10 DIAGNOSIS — K22.89 OTHER SPECIFIED DISEASE OF ESOPHAGUS: ICD-10-CM

## 2022-01-10 DIAGNOSIS — E78.5 HYPERLIPIDEMIA, UNSPECIFIED: ICD-10-CM

## 2022-01-10 DIAGNOSIS — Z90.49 ACQUIRED ABSENCE OF OTHER SPECIFIED PARTS OF DIGESTIVE TRACT: ICD-10-CM

## 2022-01-10 DIAGNOSIS — F03.90 UNSPECIFIED DEMENTIA WITHOUT BEHAVIORAL DISTURBANCE: ICD-10-CM
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
## 2022-01-27 NOTE — ED ADULT NURSE NOTE - PAIN RATING/NUMBER SCALE (0-10): ACTIVITY
I called and updated patient's mom, Teresita Cancer, [phone]. All her questions were adequately addressed.     Christina Guzmán MD 0

## 2022-03-14 NOTE — PROGRESS NOTE ADULT - PROBLEM SELECTOR PLAN 8
History     Chief Complaint   Patient presents with     Vaginal Bleeding     6 wks pregnant. spotting started Saturday then bleeding yesterday.      NATHAN Patel is a 33 year old female who presents after developing vaginal bleeding and pelvic cramps beginning yesterday.  Cramping was severe, bleeding moderate, believes she saw tissue in the bleeding.  , EAB in the past, 6 weeks by LMP.  Denies fever chills sweats.  Denies urinary symptoms change in bowel habits.  No vomiting or diarrhea.  Took some Tylenol with moderate relief over the last 18 hours.  Does not smoke, occasional alcohol prior to pregnancy no illicit substance use.    Allergies:  No Known Allergies    Problem List:    There are no problems to display for this patient.       Past Medical History:    No past medical history on file.    Past Surgical History:    Past Surgical History:   Procedure Laterality Date     EYE SURGERY  2019     NOSE SURGERY  2019       Family History:    Family History   Problem Relation Age of Onset     Hepatitis Mother         B     Diabetes Father      Liver Cancer Father        Social History:  Marital Status:   [2]  Social History     Tobacco Use     Smoking status: Never Smoker     Smokeless tobacco: Never Used   Substance Use Topics     Alcohol use: Not Currently     Drug use: Never        Medications:    Ascorbic Acid (VITAMIN C) 500 MG CHEW  Prenatal Vit-Fe Fumarate-FA (PRENATAL VITAMIN PO)          Review of Systems  All other systems reviewed and are negative.    Physical Exam   BP: 119/78  Pulse: 74  Temp: 98.3  F (36.8  C)  Resp: 16  Weight: 52.6 kg (116 lb)  SpO2: 99 %      Physical Exam  Nontoxic appearing no respiratory distress alert and oriented ×3  Head atraumatic normocephalic  Neck supple full active painless range of motion  Lungs clear to auscultation  Heart regular no murmur  Abdomen soft nontender bowel sounds positive   Strength and sensation grossly intact throughout  the extremities, gait and station normal  Speech is fluent, good eye contact, thought processes are rational    ED Course                 Procedures              Critical Care time:  none                    Results for orders placed or performed during the hospital encounter of 03/14/22 (from the past 24 hour(s))   CBC with platelets differential    Narrative    The following orders were created for panel order CBC with platelets differential.  Procedure                               Abnormality         Status                     ---------                               -----------         ------                     CBC with platelets and d...[[id number]]                      Final result                 Please view results for these tests on the individual orders.   ABO/Rh type and screen    Narrative    The following orders were created for panel order ABO/Rh type and screen.  Procedure                               Abnormality         Status                     ---------                               -----------         ------                     Adult Type and Screen[[id number]]                            Edited Result - FINAL        Please view results for these tests on the individual orders.   Comprehensive metabolic panel   Result Value Ref Range    Sodium 139 133 - 144 mmol/L    Potassium 3.8 3.4 - 5.3 mmol/L    Chloride 107 94 - 109 mmol/L    Carbon Dioxide (CO2) 27 20 - 32 mmol/L    Anion Gap 5 3 - 14 mmol/L    Urea Nitrogen 12 7 - 30 mg/dL    Creatinine 0.53 0.52 - 1.04 mg/dL    Calcium 9.1 8.5 - 10.1 mg/dL    Glucose 81 70 - 99 mg/dL    Alkaline Phosphatase 71 40 - 150 U/L    AST 19 0 - 45 U/L    ALT 33 0 - 50 U/L    Protein Total 7.3 6.8 - 8.8 g/dL    Albumin 3.5 3.4 - 5.0 g/dL    Bilirubin Total 0.4 0.2 - 1.3 mg/dL    GFR Estimate >90 >60 mL/min/1.73m2   HCG quantitative pregnancy   Result Value Ref Range    hCG Quantitative 1,171 (H) 0 - 5 IU/L   CBC with platelets and differential   Result Value Ref  Range    WBC Count 7.0 4.0 - 11.0 10e3/uL    RBC Count 4.60 3.80 - 5.20 10e6/uL    Hemoglobin 14.1 11.7 - 15.7 g/dL    Hematocrit 42.3 35.0 - 47.0 %    MCV 92 78 - 100 fL    MCH 30.7 26.5 - 33.0 pg    MCHC 33.3 31.5 - 36.5 g/dL    RDW 12.8 10.0 - 15.0 %    Platelet Count 212 150 - 450 10e3/uL    % Neutrophils 58 %    % Lymphocytes 26 %    % Monocytes 14 %    % Eosinophils 1 %    % Basophils 1 %    % Immature Granulocytes 0 %    NRBCs per 100 WBC 0 <1 /100    Absolute Neutrophils 4.1 1.6 - 8.3 10e3/uL    Absolute Lymphocytes 1.8 0.8 - 5.3 10e3/uL    Absolute Monocytes 1.0 0.0 - 1.3 10e3/uL    Absolute Eosinophils 0.1 0.0 - 0.7 10e3/uL    Absolute Basophils 0.1 0.0 - 0.2 10e3/uL    Absolute Immature Granulocytes 0.0 <=0.4 10e3/uL    Absolute NRBCs 0.0 10e3/uL   Adult Type and Screen   Result Value Ref Range    ABO/RH(D) O POS     Antibody Screen Negative Negative    SPECIMEN EXPIRATION DATE [id number]    US OB < 14 Weeks w Transvaginal    Narrative    US OB <14 WEEKS WITH TRANSVAGINAL SINGLE 3/14/2022 10:37 AM    CLINICAL HISTORY: Vaginal bleeding  TECHNIQUE: Transabdominal scans were performed. Endovaginal ultrasound  was performed to better visualize the embryo.    COMPARISON: None.    FINDINGS:  UTERUS: No intrauterine gestational sac. Endometrial lining is  thickened to 7 mm.    RIGHT OVARY: Normal, measuring 2.5 x 2.5 x 2.2 cm.  LEFT OVARY: Normal, measuring 2.5 x 2.5 x 1.4 cm.    No adnexal masses visualized. Trace pelvic fluid.      Impression    IMPRESSION:   1.  Pregnancy of unknown location. No intrauterine gestational sac. No  adnexal masses visualized. Correlate with beta hCG levels and consider  a follow-up ultrasound in 11-14 days.  2.  Trace pelvic free fluid.      HECTOR SERRANO MD         SYSTEM ID:  [id number]   UA with Microscopic reflex to Culture    Specimen: Urine, Clean Catch   Result Value Ref Range    Color Urine Straw Colorless, Straw, Light Yellow, Yellow    Appearance Urine Clear Clear     Glucose Urine Negative Negative mg/dL    Bilirubin Urine Negative Negative    Ketones Urine Negative Negative mg/dL    Specific Gravity Urine 1.002 (L) 1.003 - 1.035    Blood Urine Large (A) Negative    pH Urine 8.0 (H) 5.0 - 7.0    Protein Albumin Urine Negative Negative mg/dL    Urobilinogen Urine Normal Normal, 2.0 mg/dL    Nitrite Urine Negative Negative    Leukocyte Esterase Urine Negative Negative    Bacteria Urine Few (A) None Seen /HPF    RBC Urine 2 <=2 /HPF    WBC Urine 4 <=5 /HPF    Squamous Epithelials Urine 1 <=1 /HPF    Narrative    Urine Culture not indicated       Medications - No data to display    Assessments & Plan (with Medical Decision Making)  33-year-old -0-1-0 presents at 6 weeks by LMP with vaginal bleeding and cramps details per HPI.  OB ultrasound is reviewed as above, no evidence for intrauterine or extrauterine pregnancy.  hCG 1100.  Remainder of work-up is unremarkable.  O+.  No bleeding no significant pain today.  Findings are consistent with completed AB.  Follow-up clinic hCG next 3 to 4 days.  Return here for fever, bleeding, pain, syncope or other concern     I have reviewed the nursing notes.    I have reviewed the findings, diagnosis, plan and need for follow up with the patient.       Discharge Medication List as of 3/14/2022 12:20 PM          Final diagnoses:   Miscarriage       3/14/2022   [facility] EMERGENCY DEPT     Maximino Perry MD  [phone]     - pt with hx dementia, baseline AOx2-3  - exam with AOx4 (self, place, time, president) however answers questions inappropriately at times and poor historian    #hypothyroidism  - pt with hx hypothyroidism, on synthroid 88mcg outpt  - c/w home med  - check TSH

## 2022-06-02 NOTE — ED ADULT NURSE NOTE - CAS EDN DISCHARGE ASSESSMENT
Goal Outcome Evaluation:              Outcome Evaluation: pt transferred to [address] [place] [address]   Alert and oriented to person, place and time

## 2022-08-02 NOTE — ED PROVIDER NOTE - ATTESTATION, MLM
SPOA submitted, waitlisted for ACT, interim care management needs to be clarified.  SW left message for [facility] Board to clarify whether they will reassign pt a new worker, or close case, allowing SPOA to reassign. New Care Coordination agency has been assigned, ACT team waitlisted.  Coordinating link with pt and new care coordinator, while confirming pt's outpatient psychiatric follow up. New Care Coordination agency has been assigned, ACT team waitlisted.  Coordinating link with pt and new care coordinator, while confirming pt's outpatient psychiatric follow up.  Facilitated pt's care coordination intake, with a second meeting planned this week to assess pt's approriateness for services.   New Care Coordination agency has been assigned, ACT team waitlisted.  Coordinating link with pt and new care coordinator, while confirming pt's outpatient psychiatric follow up.  Facilitated pt's second care coordination virtual meeting.  Collateral contact with pt's brother in preparation for pt's DC.  [facility] in preparation for pt's DC. I have reviewed and confirmed nurses' notes for patient's medications, allergies, medical history, and surgical history.

## 2022-11-03 NOTE — ED ADULT NURSE NOTE - CAS ELECT INFOMATION PROVIDED
A catheter was exchanged for a (Catheter 4fr 145d Pgtl Crv 110cm Lg Inner Lum 6 Sh) catheter.  DC instructions

## 2022-11-07 NOTE — PATIENT PROFILE ADULT - HCP AGENT'S NAME
Zora Razo Trilobed Flap Text: The defect edges were debeveled with a #15 scalpel blade.  Given the location of the defect and the proximity to free margins a trilobed flap was deemed most appropriate.  Using a sterile surgical marker, an appropriate trilobed flap drawn around the defect.    The area thus outlined was incised deep to adipose tissue with a #15 scalpel blade.  The skin margins were undermined to an appropriate distance in all directions utilizing iris scissors.

## 2023-01-01 NOTE — DISCHARGE NOTE PROVIDER - HOSPITAL COURSE
#Discharge do not delete    Herminia Razo is 80 yo F with PHM CVA x3 (unclear residual), dementia,  HTN, HLD, asthma/COPD, hypothyroidism presented with 1-2 days of dizziness and is admitted for management of failure to thrive and UTI      #UTI (urinary tract infection).   ·  Plan: Pt presented with 1-2 days h/o dizziness and weakness. UA: Positive for nitrite, WBC and bacteria and WBC 12.80.  - f/u urine culture, GROWING KLEBSIELLA PNEUMONIAE  - cw ceftriaxone 1gm IV X 3 doses total, final dose at Rehab.    #Encephalopathy.   ·  Plan: Pt AAO x1 on presentation and appeared confused, however improved post IV NS. In AM patient again altered. May be related to UTI vs symptoms of depression in setting of recent loss of her daughter   - Treat UTI      #Adult failure to thrive.   ·  Plan: Patient presented with generalized weakness and is extremely frail  - f/u PT recs  - f/u nutrition rec  - regular diet.    #Sacral ulcer.   ·  Plan: Pt c/o pain to her tailbone region. Large stage 2 sacral ulcer involving b/l buttocks that do not appear infected.   -  clean dressings    #COPD with asthma.   ·  Plan: Pt is unable elaborate further.   -duonebs PRN for now.    #Hypothyroidism.   ·  Plan: -started on synthroid 88 mg daily.    #Hypertension.   ·  Plan: Currently normotensive.    #Hyperlipemia.   ·  Plan: -started on atorvastatin 20 mg daily    #Primary Billary Cirrhosis  History obtained from chart.  - Continued with Ursodiol 1000mg daily.    #Cerebrovascular accident (CVA).   ·  Plan: -started on atorvastatin 20 mg daily.      Inpatient treatment course: Patient was admitted for further monitoring and work-up.     New medications: Ceftriaxone  Labs to be followed outpatient: CBC  Exam to be followed outpatient: pelvic exam/history   LMOR for MTC back.   #Discharge do not delete    Herminia Razo is 82 yo F with PHM CVA x3 (unclear residual), dementia,  HTN, HLD, asthma/COPD, hypothyroidism presented with 1-2 days of dizziness and is admitted for management of failure to thrive and UTI      #UTI (urinary tract infection).   ·  Plan: Pt presented with 1-2 days h/o dizziness and weakness. UA: Positive for nitrite, WBC and bacteria and WBC 12.80.  - f/u urine culture, GROWING KLEBSIELLA PNEUMONIAE  - cw ceftriaxone 1gm IV X 3 doses total, final dose at Rehab.    #Encephalopathy.   ·  Plan: Pt AAO x1 on presentation and appeared confused, however improved post IV NS. In AM patient again altered. May be related to UTI vs symptoms of depression in setting of recent loss of her daughter   - Treat UTI      #Adult failure to thrive.   ·  Plan: Patient presented with generalized weakness and is extremely frail  - PT recommending [facility]    - tolerating regular diet    #Sacral ulcer.   ·  Plan: Pt c/o pain to her tailbone region. Large stage 2 sacral ulcer involving b/l buttocks that do not appear infected.   -  clean dressings    #COPD with asthma.   ·  Plan: Pt is unable elaborate further.   -duonebs PRN for now.    #Hypothyroidism.   ·  Plan: -c/w home synthroid 88 mg daily.    #Hypertension.   ·  Plan: Currently normotensive.    #Hyperlipemia.   ·  Plan: -started on atorvastatin 20 mg daily    #Primary Billary Cirrhosis  History obtained from chart.  - Continued with Ursodiol 1000mg daily.    #Cerebrovascular accident (CVA).   ·  Plan: -started on atorvastatin 20 mg daily.    #HTN: on home lisinopril  -held during admission w/ normotension    Inpatient treatment course: Patient was admitted for further monitoring and work-up.     New medications: Ceftriaxone (last day 9/14/21)   Meds stopped: lisinopril 10mg qd  Labs to be followed outpatient: CBC  Exam to be followed outpatient: pelvic exam/history  Discharged to [facility] #Discharge do not delete    Herminia Razo is 80 yo F with PHM CVA x3 (unclear residual), dementia,  HTN, HLD, asthma/COPD, hypothyroidism presented with 1-2 days of dizziness and is admitted for management of failure to thrive and UTI    #UTI (urinary tract infection).   ·  Plan: Pt presented with 1-2 days h/o dizziness and weakness. UA: Positive for nitrite, WBC and bacteria and WBC 12.80.  - f/u urine culture, GROWING KLEBSIELLA PNEUMONIAE  - completed CTX treatment 3 days    #Encephalopathy.   ·  Plan: Pt AAO x1 on presentation and appeared confused, however improved post IV NS. In AM patient again altered. May be related to UTI vs symptoms of depression in setting of recent loss of her daughter   - pt w/ normal grieving      #Adult failure to thrive.   ·  Plan: Patient presented with generalized weakness and is extremely frail  - PT recommending [facility]    - tolerating regular diet    #Sacral ulcer.   ·  Plan: Pt c/o pain to her tailbone region. Large stage 2 sacral ulcer involving b/l buttocks that do not appear infected.   -  clean dressings    #COPD with asthma.   ·  Plan: Pt is unable elaborate further.   -duonebs PRN for now.    #Hypothyroidism.   ·  Plan: -c/w home synthroid 88 mg daily.    #Hypertension.   ·  Plan: Currently normotensive.    #Hyperlipemia.   ·  Plan: -started on atorvastatin 20 mg daily    #Primary Billary Cirrhosis  History obtained from chart.  - Continued with Ursodiol 1000mg daily.    #Cerebrovascular accident (CVA).   ·  Plan: -started on atorvastatin 20 mg daily.    #HTN: on home lisinopril  -held during admission w/ normotension    Inpatient treatment course: Patient was admitted for further monitoring and work-up.     New medications: None  Meds stopped: lisinopril 10mg qd  Labs to be followed outpatient: CBC  Exam to be followed outpatient: pelvic exam/history  Discharged to [facility] #Discharge do not delete    Herminia Razo is 82 yo F with PHM CVA x3 (unclear residual), dementia,  HTN, HLD, asthma/COPD, hypothyroidism presented with 1-2 days of dizziness and is admitted for management of failure to thrive and UTI    #UTI (urinary tract infection).   ·  Plan: Pt presented with 1-2 days h/o dizziness and weakness. UA: Positive for nitrite, WBC and bacteria and WBC 12.80.  - f/u urine culture, GROWING KLEBSIELLA PNEUMONIAE  - completed CTX treatment 3 days    #Encephalopathy.   ·  Plan: Pt AAO x1 on presentation and appeared confused, however improved post IV NS and IV Rocephin.  Encephalopathy etiology was toxic metabolic encephalopathy due to UTI that has now resolved. Patient now AOx3  -TSH wnl  - f/u B12 and folate level, WNL.        #Adult failure to thrive.   ·  Plan: Patient presented with generalized weakness and is extremely frail  - PT recommending [facility] [place]    - tolerating regular diet    #Sacral ulcer.   ·  Plan: Pt c/o pain to her tailbone region. Large stage 2 sacral ulcer involving b/l buttocks that do not appear infected.   -  clean dressings    #COPD with asthma.   ·  Plan: Pt is unable elaborate further.   -duonebs PRN for now.    #Hypothyroidism.   ·  Plan: -c/w home synthroid 88 mg daily.    #Hypertension.   ·  Plan: Currently normotensive.    #Hyperlipemia.   ·  Plan: -started on atorvastatin 20 mg daily    #Primary Billary Cirrhosis  History obtained from chart.  - Continued with Ursodiol 1000mg daily.    #Cerebrovascular accident (CVA).   ·  Plan: -started on atorvastatin 20 mg daily.    #HTN: on home lisinopril  -held during admission w/ normotension    Inpatient treatment course: Patient was admitted for further monitoring and work-up.     New medications: None  Meds stopped: lisinopril 10mg qd  Labs to be followed outpatient: CBC  Exam to be followed outpatient: pelvic exam/history  Discharged to [facility] [place] #Discharge do not delete    Herminia Razo is 80 yo F with PHM CVA x3 (unclear residual), dementia,  HTN, HLD, asthma/COPD, hypothyroidism presented with 1-2 days of dizziness and is admitted for management of failure to thrive and UTI    #UTI (urinary tract infection).   ·  Plan: Pt presented with 1-2 days h/o dizziness and weakness. UA: Positive for nitrite, WBC and bacteria and WBC 12.80.  - f/u urine culture, GROWING KLEBSIELLA PNEUMONIAE  - completed CTX treatment 3 days    #Encephalopathy.   ·  Plan: Pt AAO x1 on presentation and appeared confused, however improved post IV NS and IV Rocephin.  Encephalopathy etiology was metabolic encephalopathy due to UTI that has now resolved. Patient now AOx3  -TSH wnl  - f/u B12 and folate level, WNL.        #Adult failure to thrive.   ·  Plan: Patient presented with generalized weakness and is extremely frail  - PT recommending [facility]    - tolerating regular diet    #Sacral ulcer.   ·  Plan: Pt c/o pain to her tailbone region. Large stage 2 sacral ulcer involving b/l buttocks that do not appear infected.   -  clean dressings    #COPD with asthma.   ·  Plan: Pt is unable elaborate further.   -duonebs PRN for now.    #Hypothyroidism.   ·  Plan: -c/w home synthroid 88 mg daily.    #Hypertension.   ·  Plan: Currently normotensive.    #Hyperlipemia.   ·  Plan: -started on atorvastatin 20 mg daily    #Primary Billary Cirrhosis  History obtained from chart.  - Continued with Ursodiol 1000mg daily.    #Cerebrovascular accident (CVA).   ·  Plan: -started on atorvastatin 20 mg daily.    #HTN: on home lisinopril  -held during admission w/ normotension    Inpatient treatment course: Patient was admitted for further monitoring and work-up.     New medications: None  Meds stopped: lisinopril 10mg qd  Labs to be followed outpatient: CBC  Exam to be followed outpatient: pelvic exam/history  Discharged to [facility]

## 2023-01-31 NOTE — PROGRESS NOTE ADULT - PROVIDER SPECIALTY LIST ADULT
Internal Medicine
SUBJECTIVE:  Kaya Espinoza   1965   female   Allergies   Allergen Reactions    Amoxicillin Rash     RASH    Augmentin [Amoxicillin-Pot Clavulanate] Rash     RASH    Sulfa Antibiotics Rash     FLU LIKE SYMPTOMS, HIVES       Chief Complaint   Patient presents with    Other     Possible UTI. Has to go to the bathroom a lot wakes up 3-4 times in the middle of the night been about 2.5 3 weeks . Been drinking cranberry juice intermittent pain down in the abdomen. No hurting yet but it feels like its about to get to that point . Patient Active Problem List    Diagnosis Date Noted    Osteoporosis 10/02/2017       HPI   Patient with history of depression anxiety and osteoporosis is here today with complaints of possible urinary tract infection. The patient reports for the last several days she has noticed more frequent urination and getting up 2-3 times at night to urinate. Patient denies any dysuria. No nausea vomiting fever chills or flank pain. Change in diet. Reports she has very little caffeine throughout the day. She has been trying to drink more fluids. No OTC medications or new supplements. Vaginal symptoms. Past Medical History:   Diagnosis Date    Depression     Knee pain     LEFT KNEE     Social History     Socioeconomic History    Marital status:      Spouse name: Not on file    Number of children: Not on file    Years of education: Not on file    Highest education level: Not on file   Occupational History    Occupation: [employer] OFF[employer]   Tobacco Use    Smoking status: Never    Smokeless tobacco: Never   Vaping Use    Vaping Use: Never used   Substance and Sexual Activity    Alcohol use:  Yes     Alcohol/week: 1.0 standard drink     Types: 1 Glasses of wine per week     Comment: ONE GLASS A NIGHT    Drug use: No    Sexual activity: Yes     Partners: Female   Other Topics Concern    Not on file   Social History Narrative    Not on file     Social Determinants of Health
    Financial Resource Strain: Not on file   Food Insecurity: Not on file   Transportation Needs: Not on file   Physical Activity: Not on file   Stress: Not on file   Social Connections: Not on file   Intimate Partner Violence: Not on file   Housing Stability: Not on file     History reviewed. No pertinent family history.    Review of Systems   Constitutional:  Negative for activity change, appetite change and unexpected weight change.   Respiratory:  Negative for cough and chest tightness.    Cardiovascular:  Negative for chest pain and palpitations.   Gastrointestinal:  Negative for abdominal pain, constipation and diarrhea.   Genitourinary:  Positive for frequency. Negative for difficulty urinating, dysuria, hematuria and urgency.   Musculoskeletal:  Negative for arthralgias and myalgias.   Skin:  Negative for rash.   Neurological:  Negative for light-headedness and headaches.   Hematological:  Negative for adenopathy. Does not bruise/bleed easily.   Psychiatric/Behavioral:  Negative for dysphoric mood. The patient is not nervous/anxious.      OBJECTIVE:  /72   Pulse 91   Temp 97.7 °F (36.5 °C)   Resp 18   Ht 5' 4\" (1.626 m)   Wt 137 lb 3.2 oz (62.2 kg)   LMP 07/11/2012   SpO2 98%   BMI 23.55 kg/m²   Physical Exam  Vitals and nursing note reviewed.   Constitutional:       Appearance: She is well-developed.   Neck:      Thyroid: No thyromegaly.      Vascular: No JVD.   Cardiovascular:      Rate and Rhythm: Normal rate and regular rhythm.   Pulmonary:      Effort: Pulmonary effort is normal.      Breath sounds: Normal breath sounds.   Abdominal:      General: Bowel sounds are normal. There is no distension.      Palpations: Abdomen is soft.      Tenderness: There is no abdominal tenderness. There is no guarding.   Musculoskeletal:      Cervical back: Normal range of motion and neck supple.      Comments: No CVA tenderness   Skin:     Findings: No rash.   Neurological:      Mental Status: She is alert 
Rehab Medicine
Gastroenterology
and oriented to person, place, and time. Cranial Nerves: No cranial nerve deficit. Psychiatric:         Mood and Affect: Mood normal.         Behavior: Behavior normal.     UA-normal  ASSESSMENT/PLAN:    1. Polyuria  Will send urine for culture  Patient advised to decrease caffeine and carbonated drinks    Reevaluate if symptoms persist    2. Urinary problem    - POCT Urinalysis no Micro  - Culture, Urine  - Urinalysis      Orders Placed This Encounter   Procedures    Culture, Urine     Order Specific Question:   Specify (ex-cath, midstream, cysto, etc)? Answer:   midstream    Urinalysis    POCT Urinalysis no Micro     Current Outpatient Medications   Medication Sig Dispense Refill    alendronate (FOSAMAX) 70 MG tablet Take 1 tablet by mouth every 7 days 12 tablet 0    buPROPion (WELLBUTRIN XL) 300 MG extended release tablet Take 1 tablet by mouth every morning 90 tablet 0    traZODone (DESYREL) 50 MG tablet 1 po qhs 90 tablet 0    loratadine (CLARITIN) 10 MG tablet Take 10 mg by mouth daily      Probiotic Product (ACIDOPHILUS PROBIOTIC) CAPS capsule Take 1 capsule by mouth daily       Calcium Citrate-Vitamin D (CITRACAL MAXIMUM PO) Take by mouth       No current facility-administered medications for this visit. Return if symptoms worsen or fail to improve.     Ngozi Collazo MD, MD
Internal Medicine
Internal Medicine
Palliative Care

## 2023-07-26 NOTE — CONSULT NOTE ADULT - PROBLEM SELECTOR RECOMMENDATION 9
.  frequent falls.  CT L-spine with severe scoliosis, subtle and non-displaced S4-sacrum fx. per HCP, pt ambulatory with walker, however spends >50% of day sedentary, refusing to walk or ambulate not due to pain but iso complex grief, depressed mood. pt does not appear to be able to safely care for self without 24/7 care. eats well but needs help w meal set up.   - PT rec [facility] vs HPT with 24/7 aids  - may benefit from [facility]  - assist with ADLs, encourage PO, OOB as able   - depressed mood plan as below  - nutrition consult .  frequent falls.  CT L-spine with severe scoliosis, subtle and non-displaced S4-sacrum fx. per HCP, pt ambulatory with walker, however spends >50% of day sedentary, refusing to walk or ambulate not due to pain but iso complex grief, depressed mood. pt does not appear to be able to safely care for self without 24/7 care. eating, fair appetite but needs help w meal set up. ambulatory with walker w PT today.   - PT rec [facility] vs HPT with 24/7 aids  - may benefit from [facility]  - assist with ADLs, encourage PO, OOB as able   - depressed mood plan as below Calcipotriene Counseling:  I discussed with the patient the risks of calcipotriene including but not limited to erythema, scaling, itching, and irritation.

## 2023-09-21 NOTE — ED PROVIDER NOTE - NS ED MD DISPO ADMIT [facility] PALLIATIVE CARE
Per GI okay to resume Xarelto on 9/22/23  Continue to take the Protonix 40mg twice a day for 8 weeks, followed by once daily afterwards  Please take the antibiotic, Augmentin 875 mg twice a day for 7 days starting 9/22/23  Please go see your primary care doctor and the gastroenterologist shortly (1-2 weeks) after your are discharged.  You have Home Health PT and OT provided    
NONE

## 2023-11-04 NOTE — PATIENT PROFILE ADULT - IS THERE A SUSPICION OF ABUSE/NEGLIGENCE?
Attempted to schedule CT without contrast, scheduling needs the order to be signed off, [id number] line is not answering at this time.  Will try again later this morning. This nurse notified patient and will contact patient once CT is scheduled.    no

## 2023-12-28 NOTE — ED ADULT TRIAGE NOTE - NS ED TRIAGE AVPU SCALE
Alert-The patient is alert, awake and responds to voice. The patient is oriented to time, place, and person. The triage nurse is able to obtain subjective information. asthma

## 2024-10-24 NOTE — ED ADULT NURSE NOTE - OBJECTIVE STATEMENT
Smoking Cessation counselor attempted to contact patient regarding canceled follow-up appointment.  Counselor was unable to leave a message with rescheduling information due to an unavailable voice mailbox.     Lety Sylvester RRT,MSW,LMSW, CPAHA   Certified Professional [employer]- Tobacco Treatment  [phone]     81 y F, with multiple falls, pt states she has back pain after suffering a fall, as per family member who brought her in, pt has had multiple falls in last 24 hours. Pt alert and oriented x2, respirations equal and unlabored, complaining of pain in her back.